# Patient Record
Sex: FEMALE | Race: WHITE | NOT HISPANIC OR LATINO | Employment: FULL TIME | URBAN - METROPOLITAN AREA
[De-identification: names, ages, dates, MRNs, and addresses within clinical notes are randomized per-mention and may not be internally consistent; named-entity substitution may affect disease eponyms.]

---

## 2023-09-28 ENCOUNTER — LAB (OUTPATIENT)
Dept: LAB | Facility: HOSPITAL | Age: 63
End: 2023-09-28
Payer: COMMERCIAL

## 2023-09-28 ENCOUNTER — OFFICE VISIT (OUTPATIENT)
Dept: FAMILY MEDICINE CLINIC | Facility: CLINIC | Age: 63
End: 2023-09-28
Payer: COMMERCIAL

## 2023-09-28 VITALS
TEMPERATURE: 98.6 F | OXYGEN SATURATION: 98 % | HEART RATE: 78 BPM | WEIGHT: 169.8 LBS | BODY MASS INDEX: 26.65 KG/M2 | SYSTOLIC BLOOD PRESSURE: 122 MMHG | DIASTOLIC BLOOD PRESSURE: 80 MMHG | HEIGHT: 67 IN

## 2023-09-28 DIAGNOSIS — Z12.31 ENCOUNTER FOR SCREENING MAMMOGRAM FOR MALIGNANT NEOPLASM OF BREAST: ICD-10-CM

## 2023-09-28 DIAGNOSIS — E11.65 TYPE 2 DIABETES MELLITUS WITH HYPERGLYCEMIA, WITHOUT LONG-TERM CURRENT USE OF INSULIN: ICD-10-CM

## 2023-09-28 DIAGNOSIS — E55.9 VITAMIN D DEFICIENCY: ICD-10-CM

## 2023-09-28 DIAGNOSIS — J45.20 MILD INTERMITTENT ASTHMA WITHOUT COMPLICATION: ICD-10-CM

## 2023-09-28 DIAGNOSIS — Z71.6 TOBACCO ABUSE COUNSELING: ICD-10-CM

## 2023-09-28 DIAGNOSIS — I10 PRIMARY HYPERTENSION: ICD-10-CM

## 2023-09-28 DIAGNOSIS — Z12.11 COLON CANCER SCREENING: ICD-10-CM

## 2023-09-28 DIAGNOSIS — R39.198 DIFFICULTY IN URINATION: Primary | ICD-10-CM

## 2023-09-28 DIAGNOSIS — Z01.419 ENCOUNTER FOR CERVICAL PAP SMEAR WITH PELVIC EXAM: ICD-10-CM

## 2023-09-28 DIAGNOSIS — I47.9 PAROXYSMAL TACHYCARDIA: ICD-10-CM

## 2023-09-28 DIAGNOSIS — Z87.891 PERSONAL HISTORY OF TOBACCO USE, PRESENTING HAZARDS TO HEALTH: ICD-10-CM

## 2023-09-28 DIAGNOSIS — Z82.49 FAMILY HISTORY OF HEART DISEASE: ICD-10-CM

## 2023-09-28 DIAGNOSIS — E78.5 DYSLIPIDEMIA, GOAL LDL BELOW 70: ICD-10-CM

## 2023-09-28 DIAGNOSIS — Z78.0 MENOPAUSE: ICD-10-CM

## 2023-09-28 DIAGNOSIS — Z00.00 HEALTHCARE MAINTENANCE: ICD-10-CM

## 2023-09-28 LAB
ALBUMIN UR-MCNC: 4.7 MG/DL
BASOPHILS # BLD AUTO: 0.06 10*3/MM3 (ref 0–0.2)
BASOPHILS NFR BLD AUTO: 0.6 % (ref 0–1.5)
BILIRUB BLD-MCNC: NEGATIVE MG/DL
CLARITY, POC: CLEAR
COLOR UR: YELLOW
CREAT UR-MCNC: 61.5 MG/DL
DEPRECATED RDW RBC AUTO: 44.1 FL (ref 37–54)
EOSINOPHIL # BLD AUTO: 0.11 10*3/MM3 (ref 0–0.4)
EOSINOPHIL NFR BLD AUTO: 1.1 % (ref 0.3–6.2)
ERYTHROCYTE [DISTWIDTH] IN BLOOD BY AUTOMATED COUNT: 14.4 % (ref 12.3–15.4)
EXPIRATION DATE: ABNORMAL
EXPIRATION DATE: NORMAL
GLUCOSE UR STRIP-MCNC: NEGATIVE MG/DL
HBA1C MFR BLD: 7.3 %
HCT VFR BLD AUTO: 40 % (ref 34–46.6)
HGB BLD-MCNC: 13.8 G/DL (ref 12–15.9)
IMM GRANULOCYTES # BLD AUTO: 0.03 10*3/MM3 (ref 0–0.05)
IMM GRANULOCYTES NFR BLD AUTO: 0.3 % (ref 0–0.5)
KETONES UR QL: ABNORMAL
LEUKOCYTE EST, POC: NEGATIVE
LYMPHOCYTES # BLD AUTO: 1.86 10*3/MM3 (ref 0.7–3.1)
LYMPHOCYTES NFR BLD AUTO: 18.7 % (ref 19.6–45.3)
Lab: ABNORMAL
Lab: NORMAL
MCH RBC QN AUTO: 29.3 PG (ref 26.6–33)
MCHC RBC AUTO-ENTMCNC: 34.5 G/DL (ref 31.5–35.7)
MCV RBC AUTO: 84.9 FL (ref 79–97)
MICROALBUMIN/CREAT UR: 76.4 MG/G
MONOCYTES # BLD AUTO: 1.02 10*3/MM3 (ref 0.1–0.9)
MONOCYTES NFR BLD AUTO: 10.3 % (ref 5–12)
NEUTROPHILS NFR BLD AUTO: 6.87 10*3/MM3 (ref 1.7–7)
NEUTROPHILS NFR BLD AUTO: 69 % (ref 42.7–76)
NITRITE UR-MCNC: NEGATIVE MG/ML
NRBC BLD AUTO-RTO: 0 /100 WBC (ref 0–0.2)
PH UR: 6 [PH] (ref 5–8)
PLATELET # BLD AUTO: 432 10*3/MM3 (ref 140–450)
PMV BLD AUTO: 9.7 FL (ref 6–12)
PROT UR STRIP-MCNC: NEGATIVE MG/DL
RBC # BLD AUTO: 4.71 10*6/MM3 (ref 3.77–5.28)
RBC # UR STRIP: ABNORMAL /UL
SP GR UR: 1.02 (ref 1–1.03)
UROBILINOGEN UR QL: NORMAL
WBC NRBC COR # BLD: 9.95 10*3/MM3 (ref 3.4–10.8)

## 2023-09-28 PROCEDURE — 82043 UR ALBUMIN QUANTITATIVE: CPT | Performed by: NURSE PRACTITIONER

## 2023-09-28 PROCEDURE — 82306 VITAMIN D 25 HYDROXY: CPT

## 2023-09-28 PROCEDURE — 80050 GENERAL HEALTH PANEL: CPT

## 2023-09-28 PROCEDURE — 80061 LIPID PANEL: CPT

## 2023-09-28 PROCEDURE — 82570 ASSAY OF URINE CREATININE: CPT | Performed by: NURSE PRACTITIONER

## 2023-09-28 RX ORDER — HYDROCHLOROTHIAZIDE 25 MG/1
25 TABLET ORAL DAILY
Qty: 90 TABLET | Refills: 1 | Status: SHIPPED | OUTPATIENT
Start: 2023-09-28

## 2023-09-28 RX ORDER — METOPROLOL TARTRATE 50 MG/1
50 TABLET, FILM COATED ORAL 2 TIMES DAILY
Qty: 180 TABLET | Refills: 1 | Status: SHIPPED | OUTPATIENT
Start: 2023-09-28

## 2023-09-28 RX ORDER — GLIPIZIDE 10 MG/1
10 TABLET, FILM COATED, EXTENDED RELEASE ORAL DAILY
COMMUNITY
End: 2023-09-28 | Stop reason: SDUPTHER

## 2023-09-28 RX ORDER — ASPIRIN 81 MG/1
81 TABLET ORAL DAILY
COMMUNITY

## 2023-09-28 RX ORDER — THIAMINE HCL 100 MG
TABLET ORAL DAILY
COMMUNITY

## 2023-09-28 RX ORDER — ROSUVASTATIN CALCIUM 5 MG/1
5 TABLET, COATED ORAL NIGHTLY
Qty: 90 TABLET | Refills: 1 | Status: SHIPPED | OUTPATIENT
Start: 2023-09-28

## 2023-09-28 RX ORDER — ALBUTEROL SULFATE 90 UG/1
2 AEROSOL, METERED RESPIRATORY (INHALATION) EVERY 4 HOURS PRN
COMMUNITY
End: 2023-09-28 | Stop reason: SDUPTHER

## 2023-09-28 RX ORDER — LOSARTAN POTASSIUM 25 MG/1
25 TABLET ORAL DAILY
Qty: 90 TABLET | Refills: 1 | Status: SHIPPED | OUTPATIENT
Start: 2023-09-28

## 2023-09-28 RX ORDER — GLIPIZIDE 10 MG/1
10 TABLET, FILM COATED, EXTENDED RELEASE ORAL DAILY
Qty: 90 TABLET | Refills: 1 | Status: SHIPPED | OUTPATIENT
Start: 2023-09-28

## 2023-09-28 RX ORDER — HYDROCHLOROTHIAZIDE 25 MG/1
25 TABLET ORAL DAILY
COMMUNITY
End: 2023-09-28 | Stop reason: SDUPTHER

## 2023-09-28 RX ORDER — AMLODIPINE BESYLATE 10 MG/1
10 TABLET ORAL DAILY
Qty: 90 TABLET | Refills: 1 | Status: SHIPPED | OUTPATIENT
Start: 2023-09-28

## 2023-09-28 RX ORDER — METOPROLOL TARTRATE 50 MG/1
50 TABLET, FILM COATED ORAL 2 TIMES DAILY
COMMUNITY
End: 2023-09-28 | Stop reason: SDUPTHER

## 2023-09-28 RX ORDER — ALBUTEROL SULFATE 90 UG/1
2 AEROSOL, METERED RESPIRATORY (INHALATION) EVERY 4 HOURS PRN
Qty: 18 G | Refills: 11 | Status: SHIPPED | OUTPATIENT
Start: 2023-09-28

## 2023-09-28 RX ORDER — LOSARTAN POTASSIUM 25 MG/1
25 TABLET ORAL DAILY
COMMUNITY
End: 2023-09-28 | Stop reason: SDUPTHER

## 2023-09-28 RX ORDER — OMEPRAZOLE 20 MG/1
20 CAPSULE, DELAYED RELEASE ORAL DAILY
COMMUNITY
End: 2023-09-28 | Stop reason: SDUPTHER

## 2023-09-28 RX ORDER — OMEPRAZOLE 20 MG/1
20 CAPSULE, DELAYED RELEASE ORAL DAILY
Qty: 90 CAPSULE | Refills: 1 | Status: SHIPPED | OUTPATIENT
Start: 2023-09-28

## 2023-09-28 RX ORDER — AMLODIPINE BESYLATE 10 MG/1
10 TABLET ORAL DAILY
COMMUNITY
End: 2023-09-28 | Stop reason: SDUPTHER

## 2023-09-28 NOTE — PROGRESS NOTES
Subjective   Pati SIMON is a 63 y.o. female here to establish care.  Chief Complaint   Patient presents with    New Patient Preventive Medicine Services    Urinary Retention     Pt. States she has noticed not denny a lot and an odor      Diabetes     Last A1C the end of last year .       History of Present Illness   Patient is here to establish care, previous PCP Gunjan Gonzalez in [place] .   10 years ago, moved to be closer to children  Working as  at a convenience store    The following portions of the patient's history were reviewed and updated as appropriate: allergies, current medications, past family history, past medical history, past social history, past surgical history, and problem list.    Review of Systems   Constitutional:  Positive for appetite change (decreased). Negative for chills, fatigue, fever and unexpected weight change.   HENT:  Positive for hearing loss. Negative for congestion and ear pain.    Eyes:  Positive for visual disturbance (occ). Negative for photophobia and redness.   Respiratory:  Positive for cough (occ smoker's cough). Negative for shortness of breath and wheezing.    Cardiovascular:  Positive for leg swelling (occ if on feet a long time). Negative for chest pain and palpitations.   Gastrointestinal:  Positive for diarrhea (occ). Negative for abdominal pain, blood in stool and constipation.   Endocrine: Negative for cold intolerance, heat intolerance, polydipsia and polyuria.   Genitourinary:  Positive for dysuria (and urine odor). Negative for difficulty urinating and vaginal bleeding.   Musculoskeletal:  Positive for arthralgias (thumb joints) and back pain (repetitive work).   Skin:  Negative for color change, pallor, rash and wound.   Neurological:  Positive for dizziness (occ) and numbness (occ in feet). Negative for tremors, seizures, syncope and headaches.   Psychiatric/Behavioral:  Negative for decreased concentration, self-injury, sleep  "disturbance and suicidal ideas. The patient is nervous/anxious (occ).    Blood pressure 122/80, pulse 78, temperature 98.6 °F (37 °C), temperature source Oral, height 170.2 cm (67\"), weight 77 kg (169 lb 12.8 oz), SpO2 98 %.    Allergies   Allergen Reactions    Codeine Irritability     jittering    Lipitor [Atorvastatin] Myalgia     Past Medical History:   Diagnosis Date    Diabetes mellitus     GERD (gastroesophageal reflux disease)     Hypertension     Pneumonia     Urinary tract infection     Visual impairment      Past Surgical History:   Procedure Laterality Date     SECTION      4     Family History   Problem Relation Age of Onset    Diabetes Mother     Heart attack Mother 56    Diabetes Father     Heart attack Father     Diabetes Sister     Suicidality Maternal Grandmother     Heart attack Maternal Grandfather     Stroke Paternal Grandmother     Diabetes Paternal Grandmother     Alcohol abuse Paternal Grandfather     Heart disease Paternal Grandfather      Social History     Socioeconomic History    Marital status:    Tobacco Use    Smoking status: Every Day     Packs/day: 1.00     Years: 41.00     Pack years: 41.00     Types: Cigarettes     Start date: 1982     Passive exposure: Never    Smokeless tobacco: Never   Vaping Use    Vaping Use: Never used   Substance and Sexual Activity    Alcohol use: Not Currently     Alcohol/week: 1.0 standard drink     Types: 1 Glasses of wine per week     Comment: rare    Drug use: Not Currently     Types: Marijuana     Comment: occ    Sexual activity: Not Currently     Partners: Male     Birth control/protection: Post-menopausal     Immunization History   Administered Date(s) Administered    Fluad Quad 65+ 2022    Fluzone (or Fluarix & Flulaval for VFC) >6mos 2021       Current Outpatient Medications:     albuterol sulfate  (90 Base) MCG/ACT inhaler, Inhale 2 puffs Every 4 (Four) Hours As Needed for Wheezing., Disp: 18 g, Rfl: 11    " amLODIPine (NORVASC) 10 MG tablet, Take 1 tablet by mouth Daily., Disp: 90 tablet, Rfl: 1    aspirin 81 MG EC tablet, Take 1 tablet by mouth Daily., Disp: , Rfl:     Calcium Citrate-Vitamin D3 (CITRACAL) 315-6.25 MG-MCG tablet tablet, Take  by mouth Daily., Disp: , Rfl:     glipizide (GLUCOTROL XL) 10 MG 24 hr tablet, Take 1 tablet by mouth Daily., Disp: 90 tablet, Rfl: 1    hydroCHLOROthiazide (HYDRODIURIL) 25 MG tablet, Take 1 tablet by mouth Daily., Disp: 90 tablet, Rfl: 1    losartan (COZAAR) 25 MG tablet, Take 1 tablet by mouth Daily., Disp: 90 tablet, Rfl: 1    metFORMIN (GLUCOPHAGE) 1000 MG tablet, Take 1 tablet by mouth 2 (Two) Times a Day With Meals., Disp: 180 tablet, Rfl: 1    metoprolol tartrate (LOPRESSOR) 50 MG tablet, Take 1 tablet by mouth 2 (Two) Times a Day., Disp: 180 tablet, Rfl: 1    omeprazole (priLOSEC) 20 MG capsule, Take 1 capsule by mouth Daily., Disp: 90 capsule, Rfl: 1    empagliflozin (Jardiance) 10 MG tablet tablet, Take 1 tablet by mouth Daily., Disp: 90 tablet, Rfl: 1    rosuvastatin (Crestor) 5 MG tablet, Take 1 tablet by mouth Every Night., Disp: 90 tablet, Rfl: 1    Objective   Physical Exam  Vitals reviewed.   Constitutional:       General: She is not in acute distress.     Appearance: Normal appearance. She is not ill-appearing, toxic-appearing or diaphoretic.   Neck:      Vascular: No carotid bruit.   Cardiovascular:      Rate and Rhythm: Normal rate and regular rhythm.      Heart sounds: Normal heart sounds.   Pulmonary:      Effort: Pulmonary effort is normal. No respiratory distress.      Breath sounds: Normal breath sounds.   Abdominal:      Palpations: Abdomen is soft.   Musculoskeletal:      Right lower leg: No edema.      Left lower leg: No edema.   Neurological:      Mental Status: She is alert and oriented to person, place, and time.   Psychiatric:         Thought Content: Thought content normal.         Judgment: Judgment normal.     Results for orders placed or  performed in visit on 09/28/23   Microalbumin / Creatinine Urine Ratio - Urine, Clean Catch    Specimen: Urine, Clean Catch   Result Value Ref Range    Microalbumin/Creatinine Ratio 76.4 mg/g    Creatinine, Urine 61.5 mg/dL    Microalbumin, Urine 4.7 mg/dL   POCT urinalysis dipstick, automated    Specimen: Urine   Result Value Ref Range    Color Yellow Yellow, Straw, Dark Yellow, Mae    Clarity, UA Clear Clear    Specific Gravity  1.020 1.005 - 1.030    pH, Urine 6.0 5.0 - 8.0    Leukocytes Negative Negative    Nitrite, UA Negative Negative    Protein, POC Negative Negative mg/dL    Glucose, UA Negative Negative mg/dL    Ketones, UA 2+ (A) Negative    Urobilinogen, UA Normal Normal, 0.2 E.U./dL    Bilirubin Negative Negative    Blood, UA 2+ (A) Negative    Lot Number [id number]     Expiration Date 3/25/24    POC Glycosylated Hemoglobin (Hb A1C)    Specimen: Blood   Result Value Ref Range    Hemoglobin A1C 7.3 %    Lot Number [id number]     Expiration Date 6/20/25        Assessment & Plan   Diagnoses and all orders for this visit:    1. Difficulty in urination (Primary)  -     POCT urinalysis dipstick, automated  -     Urinalysis With Culture If Indicated - Urine, Clean Catch; Future    2. Type 2 diabetes mellitus with hyperglycemia, without long-term current use of insulin  -     POC Glycosylated Hemoglobin (Hb A1C)  -     Ambulatory Referral for Diabetic Eye Exam-Ophthalmology  -     rosuvastatin (Crestor) 5 MG tablet; Take 1 tablet by mouth Every Night.  Dispense: 90 tablet; Refill: 1  -     empagliflozin (Jardiance) 10 MG tablet tablet; Take 1 tablet by mouth Daily.  Dispense: 90 tablet; Refill: 1  -     metFORMIN (GLUCOPHAGE) 1000 MG tablet; Take 1 tablet by mouth 2 (Two) Times a Day With Meals.  Dispense: 180 tablet; Refill: 1  -     glipizide (GLUCOTROL XL) 10 MG 24 hr tablet; Take 1 tablet by mouth Daily.  Dispense: 90 tablet; Refill: 1  -     Microalbumin / Creatinine Urine Ratio - Urine, Clean Catch;  Future  -     Microalbumin / Creatinine Urine Ratio - Urine, Clean Catch    3. Colon cancer screening  -     Cologuard - Stool, Per Rectum; Future    4. Encounter for screening mammogram for malignant neoplasm of breast  -     Mammo Screening Digital Tomosynthesis Bilateral With CAD; Future    5. Healthcare maintenance  -     CBC Auto Differential; Future  -     Comprehensive Metabolic Panel; Future  -     Lipid Panel; Future  -     TSH Rfx On Abnormal To Free T4; Future    6. Vitamin D deficiency  -     Vitamin D,25-Hydroxy; Future    7. Personal history of tobacco use, presenting hazards to health  -     CT chest low dose wo; Future    8. Tobacco abuse counseling    9. Encounter for cervical Pap smear with pelvic exam  -     Ambulatory Referral to Gynecology    10. Paroxysmal tachycardia  -     Ambulatory Referral to Cardiology  -     metoprolol tartrate (LOPRESSOR) 50 MG tablet; Take 1 tablet by mouth 2 (Two) Times a Day.  Dispense: 180 tablet; Refill: 1    11. Family history of heart disease  -     Ambulatory Referral to Cardiology  -     rosuvastatin (Crestor) 5 MG tablet; Take 1 tablet by mouth Every Night.  Dispense: 90 tablet; Refill: 1    12. Dyslipidemia, goal LDL below 70  -     rosuvastatin (Crestor) 5 MG tablet; Take 1 tablet by mouth Every Night.  Dispense: 90 tablet; Refill: 1    13. Primary hypertension  -     hydroCHLOROthiazide (HYDRODIURIL) 25 MG tablet; Take 1 tablet by mouth Daily.  Dispense: 90 tablet; Refill: 1  -     losartan (COZAAR) 25 MG tablet; Take 1 tablet by mouth Daily.  Dispense: 90 tablet; Refill: 1    14. Menopause  -     DEXA Bone Density Axial; Future    15. Mild intermittent asthma without complication    Other orders  -     amLODIPine (NORVASC) 10 MG tablet; Take 1 tablet by mouth Daily.  Dispense: 90 tablet; Refill: 1  -     omeprazole (priLOSEC) 20 MG capsule; Take 1 capsule by mouth Daily.  Dispense: 90 capsule; Refill: 1  -     albuterol sulfate  (90 Base) MCG/ACT  inhaler; Inhale 2 puffs Every 4 (Four) Hours As Needed for Wheezing.  Dispense: 18 g; Refill: 11      New Medications Ordered This Visit   Medications    rosuvastatin (Crestor) 5 MG tablet     Sig: Take 1 tablet by mouth Every Night.     Dispense:  90 tablet     Refill:  1    empagliflozin (Jardiance) 10 MG tablet tablet     Sig: Take 1 tablet by mouth Daily.     Dispense:  90 tablet     Refill:  1    metFORMIN (GLUCOPHAGE) 1000 MG tablet     Sig: Take 1 tablet by mouth 2 (Two) Times a Day With Meals.     Dispense:  180 tablet     Refill:  1    amLODIPine (NORVASC) 10 MG tablet     Sig: Take 1 tablet by mouth Daily.     Dispense:  90 tablet     Refill:  1    glipizide (GLUCOTROL XL) 10 MG 24 hr tablet     Sig: Take 1 tablet by mouth Daily.     Dispense:  90 tablet     Refill:  1    omeprazole (priLOSEC) 20 MG capsule     Sig: Take 1 capsule by mouth Daily.     Dispense:  90 capsule     Refill:  1    hydroCHLOROthiazide (HYDRODIURIL) 25 MG tablet     Sig: Take 1 tablet by mouth Daily.     Dispense:  90 tablet     Refill:  1    losartan (COZAAR) 25 MG tablet     Sig: Take 1 tablet by mouth Daily.     Dispense:  90 tablet     Refill:  1    metoprolol tartrate (LOPRESSOR) 50 MG tablet     Sig: Take 1 tablet by mouth 2 (Two) Times a Day.     Dispense:  180 tablet     Refill:  1    albuterol sulfate  (90 Base) MCG/ACT inhaler     Sig: Inhale 2 puffs Every 4 (Four) Hours As Needed for Wheezing.     Dispense:  18 g     Refill:  11     Screening labs ordered to evaluate chronic conditions. I will contact patient regarding test results and provide instructions regarding any necessary changes in plan of care.  Diabetes is not well controlled, add Jardiance, continue metformin and glipizide Risks, benefits, and potential side effects of current/new medications reviewed with patient.  Patient voiced understanding and wished to proceed with treatment.  Start crestor, referred to cardiology  HTN is controlled, continue  current medications  Smoking cessation advised.  Pt voiced understanding of health risks of continued smoking.  Nutrition and activity goals reviewed including: mainly water to drink, limit white flour/processed sugar, and processed foods, choose fresh fruits, vegetables, fish, lean meats,high fiber carbs, exercise  working toward 150 mins cardio per week, weight training 2x/week.  Patient was encouraged to keep me informed of any acute changes, lack of improvement, or any new concerning symptoms.

## 2023-09-29 LAB
25(OH)D3 SERPL-MCNC: 41.9 NG/ML (ref 30–100)
ALBUMIN SERPL-MCNC: 4.7 G/DL (ref 3.5–5.2)
ALBUMIN/GLOB SERPL: 1.6 G/DL
ALP SERPL-CCNC: 92 U/L (ref 39–117)
ALT SERPL W P-5'-P-CCNC: <5 U/L (ref 1–33)
ANION GAP SERPL CALCULATED.3IONS-SCNC: 11 MMOL/L (ref 5–15)
AST SERPL-CCNC: 12 U/L (ref 1–32)
BILIRUB SERPL-MCNC: 0.3 MG/DL (ref 0–1.2)
BUN SERPL-MCNC: 8 MG/DL (ref 8–23)
BUN/CREAT SERPL: 14.8 (ref 7–25)
CALCIUM SPEC-SCNC: 10.1 MG/DL (ref 8.6–10.5)
CHLORIDE SERPL-SCNC: 95 MMOL/L (ref 98–107)
CHOLEST SERPL-MCNC: 253 MG/DL (ref 0–200)
CO2 SERPL-SCNC: 27 MMOL/L (ref 22–29)
CREAT SERPL-MCNC: 0.54 MG/DL (ref 0.57–1)
EGFRCR SERPLBLD CKD-EPI 2021: 104.2 ML/MIN/1.73
GLOBULIN UR ELPH-MCNC: 3 GM/DL
GLUCOSE SERPL-MCNC: 187 MG/DL (ref 65–99)
HDLC SERPL-MCNC: 48 MG/DL (ref 40–60)
LDLC SERPL CALC-MCNC: 181 MG/DL (ref 0–100)
LDLC/HDLC SERPL: 3.72 {RATIO}
POTASSIUM SERPL-SCNC: 4.2 MMOL/L (ref 3.5–5.2)
PROT SERPL-MCNC: 7.7 G/DL (ref 6–8.5)
SODIUM SERPL-SCNC: 133 MMOL/L (ref 136–145)
TRIGL SERPL-MCNC: 133 MG/DL (ref 0–150)
TSH SERPL DL<=0.05 MIU/L-ACNC: 2.21 UIU/ML (ref 0.27–4.2)
VLDLC SERPL-MCNC: 24 MG/DL (ref 5–40)

## 2023-10-06 ENCOUNTER — TELEPHONE (OUTPATIENT)
Dept: FAMILY MEDICINE CLINIC | Facility: CLINIC | Age: 63
End: 2023-10-06
Payer: COMMERCIAL

## 2023-12-28 ENCOUNTER — OFFICE VISIT (OUTPATIENT)
Dept: FAMILY MEDICINE CLINIC | Facility: CLINIC | Age: 63
End: 2023-12-28
Payer: COMMERCIAL

## 2023-12-28 VITALS
DIASTOLIC BLOOD PRESSURE: 68 MMHG | HEART RATE: 90 BPM | BODY MASS INDEX: 25.27 KG/M2 | HEIGHT: 67 IN | OXYGEN SATURATION: 97 % | SYSTOLIC BLOOD PRESSURE: 110 MMHG | WEIGHT: 161 LBS

## 2023-12-28 DIAGNOSIS — M62.838 MUSCLE SPASMS OF NECK: ICD-10-CM

## 2023-12-28 DIAGNOSIS — E78.5 DYSLIPIDEMIA, GOAL LDL BELOW 70: ICD-10-CM

## 2023-12-28 DIAGNOSIS — I47.9 PAROXYSMAL TACHYCARDIA: ICD-10-CM

## 2023-12-28 DIAGNOSIS — Z00.00 ANNUAL PHYSICAL EXAM: Primary | ICD-10-CM

## 2023-12-28 DIAGNOSIS — F17.210 CIGARETTE SMOKER: ICD-10-CM

## 2023-12-28 DIAGNOSIS — E11.65 UNCONTROLLED TYPE 2 DIABETES MELLITUS WITH HYPERGLYCEMIA: ICD-10-CM

## 2023-12-28 DIAGNOSIS — Z23 NEEDS FLU SHOT: ICD-10-CM

## 2023-12-28 LAB
EXPIRATION DATE: ABNORMAL
HBA1C MFR BLD: 7.9 % (ref 4.5–5.7)
Lab: ABNORMAL

## 2023-12-28 RX ORDER — EZETIMIBE 10 MG/1
10 TABLET ORAL DAILY
Qty: 90 TABLET | Refills: 1 | Status: SHIPPED | OUTPATIENT
Start: 2023-12-28

## 2023-12-28 RX ORDER — METOPROLOL TARTRATE 50 MG/1
50 TABLET, FILM COATED ORAL 2 TIMES DAILY
Qty: 180 TABLET | Refills: 1 | Status: SHIPPED | OUTPATIENT
Start: 2023-12-28

## 2023-12-28 RX ORDER — CYCLOBENZAPRINE HCL 5 MG
5 TABLET ORAL 3 TIMES DAILY PRN
Qty: 30 TABLET | Refills: 0 | Status: SHIPPED | OUTPATIENT
Start: 2023-12-28

## 2023-12-28 NOTE — PATIENT INSTRUCTIONS
Contact GYN, breast center for mammogram, do cologuard, schedule CT scan for lung cancer screening    Let me know if there is a problem getting your medications

## 2023-12-28 NOTE — PROGRESS NOTES
"  Patient Care Team:  Anuradha Toledo APRN as PCP - General (Nurse Practitioner)     Chief Complaint   Patient presents with    Annual Exam     Stopped taking cholesterol med because caused nausea   Has been having some neck spasms so may want muscle relaxers   Wants refill for lopressor        Chief complaint: Patient is in today for a physical     Patient is a 63 y.o. female who presents for her yearly physical exam.     HPI   States she took rosuvastatin for a month, caused \"really bad\" nausea, stopped taking, symptoms resolved.  Does not eat fried foods, has occ low blood sugars; blood sugars running 120's most of the time. She never picked up the Jardiance  States she has the cologuard, just has not done yet  Will schedule mammogram, eye exam, GYN  Has been having neck pain x 3 weeks, works at a cash register.  Trying to cut back on smoking  Review of Systems   Constitutional:  Negative for chills, fatigue, fever and unexpected weight change.   HENT:  Positive for hearing loss. Negative for congestion and ear pain.    Eyes:  Positive for visual disturbance (occ). Negative for photophobia, pain and redness.   Respiratory:  Positive for cough (occ smoker's cough). Negative for shortness of breath and wheezing.    Cardiovascular:  Positive for leg swelling (occ). Negative for chest pain and palpitations.   Gastrointestinal:  Positive for diarrhea (occ food related). Negative for abdominal pain, blood in stool, constipation, nausea and vomiting.   Endocrine: Negative for polydipsia and polyuria.   Genitourinary:  Negative for difficulty urinating, dysuria and vaginal bleeding.   Musculoskeletal:  Positive for arthralgias, neck pain and neck stiffness.   Skin:  Negative for color change, pallor, rash and wound.   Neurological:  Positive for dizziness (occ, if gets up too fast), numbness (occ in feet) and headaches (occ). Negative for tremors.   Psychiatric/Behavioral:  Positive for dysphoric mood (occ, able to " manage) and sleep disturbance (occ). Negative for self-injury and suicidal ideas. The patient is nervous/anxious.       History  Past Medical History:   Diagnosis Date    Diabetes mellitus     GERD (gastroesophageal reflux disease)     Hypertension     Pneumonia     Urinary tract infection     Visual impairment       Past Surgical History:   Procedure Laterality Date     SECTION      4      Allergies   Allergen Reactions    Codeine Irritability     jittering    Lipitor [Atorvastatin] Myalgia    Rosuvastatin Nausea Only      Family History   Problem Relation Age of Onset    Diabetes Mother     Heart attack Mother 56    Diabetes Father     Heart attack Father     Diabetes Sister     Suicidality Maternal Grandmother     Heart attack Maternal Grandfather     Stroke Paternal Grandmother     Diabetes Paternal Grandmother     Alcohol abuse Paternal Grandfather     Heart disease Paternal Grandfather      Social History     Socioeconomic History    Marital status:    Tobacco Use    Smoking status: Every Day     Packs/day: 1.00     Years: 41.00     Additional pack years: 0.00     Total pack years: 41.00     Types: Cigarettes     Start date: 1982     Passive exposure: Never    Smokeless tobacco: Never   Vaping Use    Vaping Use: Never used   Substance and Sexual Activity    Alcohol use: Not Currently     Alcohol/week: 1.0 standard drink of alcohol     Types: 1 Glasses of wine per week     Comment: rare    Drug use: Not Currently     Types: Marijuana     Comment: occ    Sexual activity: Not Currently     Partners: Male     Birth control/protection: Post-menopausal        Current Outpatient Medications:     albuterol sulfate  (90 Base) MCG/ACT inhaler, Inhale 2 puffs Every 4 (Four) Hours As Needed for Wheezing., Disp: 18 g, Rfl: 11    amLODIPine (NORVASC) 10 MG tablet, Take 1 tablet by mouth Daily., Disp: 90 tablet, Rfl: 1    aspirin 81 MG EC tablet, Take 1 tablet by mouth Daily., Disp: , Rfl:      Calcium Citrate-Vitamin D3 (CITRACAL) 315-6.25 MG-MCG tablet tablet, Take  by mouth Daily., Disp: , Rfl:     glipizide (GLUCOTROL XL) 10 MG 24 hr tablet, Take 1 tablet by mouth Daily., Disp: 90 tablet, Rfl: 1    hydroCHLOROthiazide (HYDRODIURIL) 25 MG tablet, Take 1 tablet by mouth Daily., Disp: 90 tablet, Rfl: 1    metFORMIN (GLUCOPHAGE) 1000 MG tablet, Take 1 tablet by mouth 2 (Two) Times a Day With Meals., Disp: 180 tablet, Rfl: 1    metoprolol tartrate (LOPRESSOR) 50 MG tablet, Take 1 tablet by mouth 2 (Two) Times a Day., Disp: 180 tablet, Rfl: 1    omeprazole (priLOSEC) 20 MG capsule, Take 1 capsule by mouth Daily., Disp: 90 capsule, Rfl: 1    cyclobenzaprine (FLEXERIL) 5 MG tablet, Take 1 tablet by mouth 3 (Three) Times a Day As Needed for Muscle Spasms., Disp: 30 tablet, Rfl: 0    ezetimibe (Zetia) 10 MG tablet, Take 1 tablet by mouth Daily., Disp: 90 tablet, Rfl: 1    losartan (COZAAR) 25 MG tablet, Take 1 tablet by mouth Daily., Disp: 90 tablet, Rfl: 1    nicotine polacrilex (Nicorette) 4 MG gum, Chew 1 each Every 2 (Two) Hours As Needed for Smoking Cessation., Disp: 220 each, Rfl: 1    SITagliptin (Januvia) 50 MG tablet, Take 1 tablet by mouth Daily., Disp: 90 tablet, Rfl: 1    Immunizations   N/A   Prescribed/Refused   Date     Td/Tdap  (Booster Q 10 yrs)   []           Prescribed    []     Refused        []           Flu  (Yearly)   []        Prescribed    []     Refused        []           Pneumonia      []        Prescribed    []     Refused        []                 Hep B     []        Prescribed    []     Refused        []           Shingles  (Age 50 and older)   []        Prescribed    []     Refused        []           Immunization History   Administered Date(s) Administered    Fluad Quad 65+ 11/16/2022    Fluzone (or Fluarix & Flulaval for VFC) >6mos 11/11/2021     Health Maintenance   Topic Date Due    MAMMOGRAM  Never done    COLORECTAL CANCER SCREENING  Never done    COVID-19 Vaccine (1) Never  done    Pneumococcal Vaccine 0-64 (1 of 2 - PCV) Never done    TDAP/TD VACCINES (1 - Tdap) Never done    ZOSTER VACCINE (1 of 2) Never done    LUNG CANCER SCREENING  Never done    INFLUENZA VACCINE  08/01/2023    HEPATITIS C SCREENING  Never done    ANNUAL PHYSICAL  Never done    DIABETIC FOOT EXAM  Never done    PAP SMEAR  Never done    DIABETIC EYE EXAM  Never done    HEMOGLOBIN A1C  03/28/2024    LIPID PANEL  09/28/2024    URINE MICROALBUMIN  09/28/2024    BMI FOLLOWUP  12/28/2024        Diabetes  [x] Yes  [] No   N/A      Date     Eye Exam     []             []   Complete     [x]   Recommended Date:  Where:       Foot Exam     []         []   Complete          Obesity Counseling     []       []   Complete       Hemoglobin A1C   Date Value Ref Range Status   09/28/2023 7.3 % Corrected     Microalbumin, Urine   Date Value Ref Range Status   09/28/2023 4.7 mg/dL Final       Additional Testing      Date     Colorectal Screening       []   N/A   []   Complete    []   Ordered     Date:    Where:       Pap      []   N/A   []   Complete   []   OB/GYN Date:    Where:       Mammogram        []   N/A   []   Complete   []  OB/GYN   []   Ordered Date:    Where:     PSA  (Over age 50)    []   N/A   []   Complete   []   Ordered Date:    Where:     US Aorta  (For male smokers, age 65)     []   N/A   []   Complete   []   Ordered Date:    Where:     CT for Smoker  (Age 55-75, 30 pk yr)    []   N/A   []   Complete   []   Ordered Date:    Where:     Bone Density/DEXA      []   N/A   []   Complete   []   Ordered Date:    Follow-up:     Hep. C        []   N/A   []   Complete   []   Ordered Date:    Where:     Results for orders placed or performed in visit on 09/28/23   CBC Auto Differential    Specimen: Blood   Result Value Ref Range    WBC 9.95 3.40 - 10.80 10*3/mm3    RBC 4.71 3.77 - 5.28 10*6/mm3    Hemoglobin 13.8 12.0 - 15.9 g/dL    Hematocrit 40.0 34.0 - 46.6 %    MCV 84.9 79.0 - 97.0 fL    MCH 29.3 26.6 - 33.0 pg    MCHC 34.5  "31.5 - 35.7 g/dL    RDW 14.4 12.3 - 15.4 %    RDW-SD 44.1 37.0 - 54.0 fl    MPV 9.7 6.0 - 12.0 fL    Platelets 432 140 - 450 10*3/mm3    Neutrophil % 69.0 42.7 - 76.0 %    Lymphocyte % 18.7 (L) 19.6 - 45.3 %    Monocyte % 10.3 5.0 - 12.0 %    Eosinophil % 1.1 0.3 - 6.2 %    Basophil % 0.6 0.0 - 1.5 %    Immature Grans % 0.3 0.0 - 0.5 %    Neutrophils, Absolute 6.87 1.70 - 7.00 10*3/mm3    Lymphocytes, Absolute 1.86 0.70 - 3.10 10*3/mm3    Monocytes, Absolute 1.02 (H) 0.10 - 0.90 10*3/mm3    Eosinophils, Absolute 0.11 0.00 - 0.40 10*3/mm3    Basophils, Absolute 0.06 0.00 - 0.20 10*3/mm3    Immature Grans, Absolute 0.03 0.00 - 0.05 10*3/mm3    nRBC 0.0 0.0 - 0.2 /100 WBC   Comprehensive Metabolic Panel    Specimen: Blood   Result Value Ref Range    Glucose 187 (H) 65 - 99 mg/dL    BUN 8 8 - 23 mg/dL    Creatinine 0.54 (L) 0.57 - 1.00 mg/dL    Sodium 133 (L) 136 - 145 mmol/L    Potassium 4.2 3.5 - 5.2 mmol/L    Chloride 95 (L) 98 - 107 mmol/L    CO2 27.0 22.0 - 29.0 mmol/L    Calcium 10.1 8.6 - 10.5 mg/dL    Total Protein 7.7 6.0 - 8.5 g/dL    Albumin 4.7 3.5 - 5.2 g/dL    ALT (SGPT) <5 1 - 33 U/L    AST (SGOT) 12 1 - 32 U/L    Alkaline Phosphatase 92 39 - 117 U/L    Total Bilirubin 0.3 0.0 - 1.2 mg/dL    Globulin 3.0 gm/dL    A/G Ratio 1.6 g/dL    BUN/Creatinine Ratio 14.8 7.0 - 25.0    Anion Gap 11.0 5.0 - 15.0 mmol/L    eGFR 104.2 >60.0 mL/min/1.73   Lipid Panel    Specimen: Blood   Result Value Ref Range    Total Cholesterol 253 (H) 0 - 200 mg/dL    Triglycerides 133 0 - 150 mg/dL    HDL Cholesterol 48 40 - 60 mg/dL    LDL Cholesterol  181 (H) 0 - 100 mg/dL    VLDL Cholesterol 24 5 - 40 mg/dL    LDL/HDL Ratio 3.72    Vitamin D,25-Hydroxy    Specimen: Blood   Result Value Ref Range    25 Hydroxy, Vitamin D 41.9 30.0 - 100.0 ng/ml   TSH Rfx On Abnormal To Free T4    Specimen: Blood   Result Value Ref Range    TSH 2.210 0.270 - 4.200 uIU/mL            /68   Pulse 90   Ht 170.2 cm (67.01\")   Wt 73 kg (161 lb)  "  SpO2 97%   BMI 25.21 kg/m²       Physical Exam  Vitals and nursing note reviewed.   Constitutional:       General: She is not in acute distress.     Appearance: Normal appearance. She is well-developed. She is not diaphoretic.   HENT:      Head: Normocephalic and atraumatic.      Right Ear: External ear normal.      Left Ear: External ear normal.      Nose: Nose normal.   Eyes:      General: No scleral icterus.        Right eye: No discharge.         Left eye: No discharge.      Conjunctiva/sclera: Conjunctivae normal.      Pupils: Pupils are equal, round, and reactive to light.   Neck:      Thyroid: No thyromegaly.      Vascular: No JVD.      Trachea: No tracheal deviation.   Cardiovascular:      Rate and Rhythm: Normal rate and regular rhythm.      Heart sounds: Normal heart sounds. No murmur heard.     No friction rub. No gallop.   Pulmonary:      Effort: Pulmonary effort is normal. No respiratory distress.      Breath sounds: Normal breath sounds. No stridor. No wheezing or rales.   Chest:      Chest wall: No tenderness.   Breasts:     Right: Normal.      Left: Normal.      Comments: Fibrocystic areas bilaterally  Abdominal:      General: Bowel sounds are normal. There is no distension.      Palpations: Abdomen is soft. There is no mass.      Tenderness: There is no abdominal tenderness. There is no guarding or rebound.      Hernia: No hernia is present.   Musculoskeletal:         General: No tenderness or deformity. Normal range of motion.      Cervical back: Neck supple. Tenderness present.      Right lower leg: No edema.      Left lower leg: No edema.   Lymphadenopathy:      Cervical: No cervical adenopathy.      Upper Body:      Right upper body: No supraclavicular, axillary or pectoral adenopathy.      Left upper body: No supraclavicular, axillary or pectoral adenopathy.   Skin:     General: Skin is warm and dry.      Coloration: Skin is not pale.      Findings: No erythema or rash.   Neurological:       Mental Status: She is alert and oriented to person, place, and time.      Cranial Nerves: No cranial nerve deficit.      Motor: No abnormal muscle tone.      Coordination: Coordination normal.      Deep Tendon Reflexes: Reflexes are normal and symmetric. Reflexes normal.   Psychiatric:         Behavior: Behavior normal.         Thought Content: Thought content normal.         Judgment: Judgment normal.             Counseling provided on diet and nutrition, exercise, prevention of cardiac or vascular disease, hypertension, diabetes, hyperlipidemia, anxiety, and flu prevention.    Diagnoses and all orders for this visit:    Annual physical exam    Needs flu shot  -     Fluzone >6 Months ([id number])    Uncontrolled type 2 diabetes mellitus with hyperglycemia  -     POC Glycosylated Hemoglobin (Hb A1C)  -     SITagliptin (Januvia) 50 MG tablet; Take 1 tablet by mouth Daily.    Paroxysmal tachycardia  -     metoprolol tartrate (LOPRESSOR) 50 MG tablet; Take 1 tablet by mouth 2 (Two) Times a Day.    Dyslipidemia, goal LDL below 70  -     ezetimibe (Zetia) 10 MG tablet; Take 1 tablet by mouth Daily.    Cigarette smoker  -     nicotine polacrilex (Nicorette) 4 MG gum; Chew 1 each Every 2 (Two) Hours As Needed for Smoking Cessation.    Muscle spasms of neck  -     cyclobenzaprine (FLEXERIL) 5 MG tablet; Take 1 tablet by mouth 3 (Three) Times a Day As Needed for Muscle Spasms.     Screening labs ordered to evaluate chronic conditions. I will contact patient regarding test results and provide instructions regarding any necessary changes in plan of care.  Diabetes is not controlled, patient did not  Jardiance due to cost.  Januvia prescribed, continue glipizide.  Has not tolerated Lipitor or rosuvastatin, will try Zetia for dyslipidemia  Continue metoprolol for paroxysmal tachycardia  Flexeril prescribed for muscle spasms and neck    Risks/benefits and potential side effects of various smoking cessation treatment options  "reviewed with patient.  Smoking cessation support options also reviewed with patient.  \"Beat the Pack\" brochure provided and reviewed with patient.  Patient voiced understanding and wishes to proceed with nicorette gum.  A total of 3 minutes dedicated to smoking cessation counseling during this visit.    Anuradha PlainfieldWOLF   12/28/2023   14:47 EST          Please note that portions of this document were completed with a voice recognition program.     At [facility], we believe that sharing information builds trust and better relationships. You are receiving this note because you are receiving care at [facility] or have recently visited. It is possible you will see health information before a provider has talked with you about it. This kind of information can be easy to misunderstand. To help you fully understand what it means for your health, we urge you to discuss this note with your provider.  "

## 2024-03-28 PROBLEM — E78.5 DYSLIPIDEMIA, GOAL LDL BELOW 70: Status: ACTIVE | Noted: 2024-03-28

## 2024-03-28 PROBLEM — I10 PRIMARY HYPERTENSION: Status: ACTIVE | Noted: 2024-03-28

## 2024-03-28 PROBLEM — E11.65 UNCONTROLLED TYPE 2 DIABETES MELLITUS WITH HYPERGLYCEMIA: Status: ACTIVE | Noted: 2024-03-28

## 2024-03-30 DIAGNOSIS — I10 PRIMARY HYPERTENSION: ICD-10-CM

## 2024-03-30 DIAGNOSIS — E11.65 TYPE 2 DIABETES MELLITUS WITH HYPERGLYCEMIA, WITHOUT LONG-TERM CURRENT USE OF INSULIN: ICD-10-CM

## 2024-04-01 NOTE — TELEPHONE ENCOUNTER
Rx Refill Note  Requested Prescriptions     Pending Prescriptions Disp Refills    losartan (COZAAR) 25 MG tablet [Pharmacy Med Name: LOSARTAN 25MG TABLETS] 90 tablet 1     Sig: TAKE 1 TABLET BY MOUTH DAILY    hydroCHLOROthiazide 25 MG tablet [Pharmacy Med Name: HYDROCHLOROTHIAZIDE 25MGTABLETS] 90 tablet 1     Sig: TAKE 1 TABLET BY MOUTH DAILY    glipizide (GLUCOTROL XL) 10 MG 24 hr tablet [Pharmacy Med Name: GLIPIZIDE ER 10MG TABLETS] 90 tablet 1     Sig: TAKE 1 TABLET BY MOUTH DAILY      Last office visit with prescribing clinician: 12/28/2023   Last telemedicine visit with prescribing clinician: Visit date not found   Next office visit with prescribing clinician: Visit date not found       Zoey Godoy MA  04/01/24, 14:46 EDT    Called pt. To schedule appt. For med refill. Pt. Was to Return in about 3 months (around 3/28/2024) for Recheck; A1c.      Okay for HUB to schedule & relay message.

## 2024-04-02 NOTE — TELEPHONE ENCOUNTER
Rx Refill Note  Requested Prescriptions     Pending Prescriptions Disp Refills    losartan (COZAAR) 25 MG tablet [Pharmacy Med Name: LOSARTAN 25MG TABLETS] 90 tablet 1     Sig: TAKE 1 TABLET BY MOUTH DAILY    hydroCHLOROthiazide 25 MG tablet [Pharmacy Med Name: HYDROCHLOROTHIAZIDE 25MGTABLETS] 90 tablet 1     Sig: TAKE 1 TABLET BY MOUTH DAILY    glipizide (GLUCOTROL XL) 10 MG 24 hr tablet [Pharmacy Med Name: GLIPIZIDE ER 10MG TABLETS] 90 tablet 1     Sig: TAKE 1 TABLET BY MOUTH DAILY      Last office visit with prescribing clinician: 12/28/2023   Last telemedicine visit with prescribing clinician: Visit date not found   Next office visit with prescribing clinician: Visit date not found                         Would you like a call back once the refill request has been completed: [] Yes [] No    If the office needs to give you a call back, can they leave a voicemail: [] Yes [] No    Erlinda Duran MA  04/02/24, 09:57 EDT    Patient was to Return in about 3 months (around 3/28/2024) for Recheck; A1c.     Called patient number is not accepting calls at this time    OK TO RELAY AND SCHEDULE APPOINTMENT

## 2024-04-03 RX ORDER — HYDROCHLOROTHIAZIDE 25 MG/1
25 TABLET ORAL DAILY
Qty: 90 TABLET | Refills: 1 | Status: SHIPPED | OUTPATIENT
Start: 2024-04-03

## 2024-04-03 RX ORDER — GLIPIZIDE 10 MG/1
10 TABLET, FILM COATED, EXTENDED RELEASE ORAL DAILY
Qty: 90 TABLET | Refills: 1 | Status: SHIPPED | OUTPATIENT
Start: 2024-04-03

## 2024-04-03 RX ORDER — LOSARTAN POTASSIUM 25 MG/1
25 TABLET ORAL DAILY
Qty: 90 TABLET | Refills: 1 | Status: SHIPPED | OUTPATIENT
Start: 2024-04-03

## 2024-04-03 NOTE — TELEPHONE ENCOUNTER
3rd attempt    Patient was to Return in about 3 months (around 3/28/2024) for Recheck; A1c.      Called patient number is not accepting calls at this time     OK TO RELAY AND SCHEDULE APPOINTMENT

## 2024-09-27 ENCOUNTER — APPOINTMENT (OUTPATIENT)
Dept: GENERAL RADIOLOGY | Facility: HOSPITAL | Age: 64
DRG: 871 | End: 2024-09-27
Payer: MEDICAID

## 2024-09-27 ENCOUNTER — HOSPITAL ENCOUNTER (INPATIENT)
Facility: HOSPITAL | Age: 64
LOS: 13 days | Discharge: HOME | DRG: 871 | End: 2024-10-10
Attending: EMERGENCY MEDICINE | Admitting: HOSPITALIST
Payer: MEDICAID

## 2024-09-27 DIAGNOSIS — J18.9 PNEUMONIA OF LEFT UPPER LOBE DUE TO INFECTIOUS ORGANISM: ICD-10-CM

## 2024-09-27 DIAGNOSIS — R73.9 HYPERGLYCEMIA: ICD-10-CM

## 2024-09-27 DIAGNOSIS — J96.01 ACUTE RESPIRATORY FAILURE WITH HYPOXIA AND HYPERCAPNIA: Primary | ICD-10-CM

## 2024-09-27 DIAGNOSIS — J45.51 SEVERE PERSISTENT ASTHMA WITH EXACERBATION: ICD-10-CM

## 2024-09-27 DIAGNOSIS — N17.9 ACUTE KIDNEY INJURY: ICD-10-CM

## 2024-09-27 DIAGNOSIS — C34.12 CANCER OF BRONCHUS OF LEFT UPPER LOBE: ICD-10-CM

## 2024-09-27 DIAGNOSIS — J96.02 ACUTE RESPIRATORY FAILURE WITH HYPOXIA AND HYPERCAPNIA: Primary | ICD-10-CM

## 2024-09-27 DIAGNOSIS — R91.8 ENDOBRONCHIAL MASS: ICD-10-CM

## 2024-09-27 PROBLEM — E87.20 LACTIC ACID INCREASED: Status: ACTIVE | Noted: 2024-09-27

## 2024-09-27 PROBLEM — A41.9 SEPSIS: Status: ACTIVE | Noted: 2024-09-27

## 2024-09-27 PROBLEM — R79.89 ELEVATED TROPONIN: Status: ACTIVE | Noted: 2024-09-27

## 2024-09-27 LAB
ALBUMIN SERPL-MCNC: 3.6 G/DL (ref 3.5–5.2)
ALBUMIN/GLOB SERPL: 1.1 G/DL
ALP SERPL-CCNC: 163 U/L (ref 39–117)
ALT SERPL W P-5'-P-CCNC: 16 U/L (ref 1–33)
ANION GAP SERPL CALCULATED.3IONS-SCNC: 13 MMOL/L (ref 5–15)
ARTERIAL PATENCY WRIST A: POSITIVE
AST SERPL-CCNC: 38 U/L (ref 1–32)
ATMOSPHERIC PRESS: ABNORMAL MM[HG]
B PARAPERT DNA SPEC QL NAA+PROBE: NOT DETECTED
B PERT DNA SPEC QL NAA+PROBE: NOT DETECTED
B-OH-BUTYR SERPL-SCNC: 0.77 MMOL/L (ref 0.02–0.27)
BASE EXCESS BLDA CALC-SCNC: 3.8 MMOL/L (ref 0–2)
BASOPHILS # BLD AUTO: 0.04 10*3/MM3 (ref 0–0.2)
BASOPHILS NFR BLD AUTO: 0.2 % (ref 0–1.5)
BDY SITE: ABNORMAL
BILIRUB SERPL-MCNC: 0.4 MG/DL (ref 0–1.2)
BODY TEMPERATURE: 37
BUN SERPL-MCNC: 10 MG/DL (ref 8–23)
BUN/CREAT SERPL: 19.2 (ref 7–25)
C PNEUM DNA NPH QL NAA+NON-PROBE: NOT DETECTED
CALCIUM SPEC-SCNC: 9.1 MG/DL (ref 8.6–10.5)
CHLORIDE SERPL-SCNC: 89 MMOL/L (ref 98–107)
CO2 BLDA-SCNC: 32.8 MMOL/L (ref 22–33)
CO2 SERPL-SCNC: 29 MMOL/L (ref 22–29)
COHGB MFR BLD: 5.2 % (ref 0–2)
CREAT SERPL-MCNC: 0.52 MG/DL (ref 0.57–1)
D-LACTATE SERPL-SCNC: 1.2 MMOL/L (ref 0.5–2)
D-LACTATE SERPL-SCNC: 2.5 MMOL/L (ref 0.5–2)
DEPRECATED RDW RBC AUTO: 46.4 FL (ref 37–54)
EGFRCR SERPLBLD CKD-EPI 2021: 104.5 ML/MIN/1.73
EOSINOPHIL # BLD AUTO: 0.02 10*3/MM3 (ref 0–0.4)
EOSINOPHIL NFR BLD AUTO: 0.1 % (ref 0.3–6.2)
EPAP: 0
ERYTHROCYTE [DISTWIDTH] IN BLOOD BY AUTOMATED COUNT: 15.9 % (ref 12.3–15.4)
FLUAV SUBTYP SPEC NAA+PROBE: NOT DETECTED
FLUBV RNA ISLT QL NAA+PROBE: NOT DETECTED
GLOBULIN UR ELPH-MCNC: 3.4 GM/DL
GLUCOSE BLDC GLUCOMTR-MCNC: 349 MG/DL (ref 70–130)
GLUCOSE BLDC GLUCOMTR-MCNC: 471 MG/DL (ref 70–130)
GLUCOSE BLDC GLUCOMTR-MCNC: 482 MG/DL (ref 70–130)
GLUCOSE BLDC GLUCOMTR-MCNC: 517 MG/DL (ref 70–130)
GLUCOSE BLDC GLUCOMTR-MCNC: 528 MG/DL (ref 70–130)
GLUCOSE BLDC GLUCOMTR-MCNC: 529 MG/DL (ref 70–130)
GLUCOSE SERPL-MCNC: 592 MG/DL (ref 65–99)
HADV DNA SPEC NAA+PROBE: NOT DETECTED
HCO3 BLDA-SCNC: 31 MMOL/L (ref 20–26)
HCOV 229E RNA SPEC QL NAA+PROBE: NOT DETECTED
HCOV HKU1 RNA SPEC QL NAA+PROBE: NOT DETECTED
HCOV NL63 RNA SPEC QL NAA+PROBE: NOT DETECTED
HCOV OC43 RNA SPEC QL NAA+PROBE: NOT DETECTED
HCT VFR BLD AUTO: 37.7 % (ref 34–46.6)
HCT VFR BLD CALC: 35.6 % (ref 38–51)
HGB BLD-MCNC: 11.7 G/DL (ref 12–15.9)
HGB BLDA-MCNC: 11.6 G/DL (ref 14–18)
HMPV RNA NPH QL NAA+NON-PROBE: NOT DETECTED
HOLD SPECIMEN: NORMAL
HPIV1 RNA ISLT QL NAA+PROBE: NOT DETECTED
HPIV2 RNA SPEC QL NAA+PROBE: NOT DETECTED
HPIV3 RNA NPH QL NAA+PROBE: NOT DETECTED
HPIV4 P GENE NPH QL NAA+PROBE: NOT DETECTED
IMM GRANULOCYTES # BLD AUTO: 0.08 10*3/MM3 (ref 0–0.05)
IMM GRANULOCYTES NFR BLD AUTO: 0.4 % (ref 0–0.5)
INHALED O2 CONCENTRATION: 36 %
IPAP: 0
L PNEUMO1 AG UR QL IA: NEGATIVE
LYMPHOCYTES # BLD AUTO: 1.02 10*3/MM3 (ref 0.7–3.1)
LYMPHOCYTES NFR BLD AUTO: 5.7 % (ref 19.6–45.3)
M PNEUMO IGG SER IA-ACNC: NOT DETECTED
MCH RBC QN AUTO: 24.8 PG (ref 26.6–33)
MCHC RBC AUTO-ENTMCNC: 31 G/DL (ref 31.5–35.7)
MCV RBC AUTO: 80 FL (ref 79–97)
METHGB BLD QL: 0 % (ref 0–1.5)
MODALITY: ABNORMAL
MONOCYTES # BLD AUTO: 1.5 10*3/MM3 (ref 0.1–0.9)
MONOCYTES NFR BLD AUTO: 8.4 % (ref 5–12)
NEUTROPHILS NFR BLD AUTO: 15.22 10*3/MM3 (ref 1.7–7)
NEUTROPHILS NFR BLD AUTO: 85.2 % (ref 42.7–76)
NRBC BLD AUTO-RTO: 0 /100 WBC (ref 0–0.2)
NT-PROBNP SERPL-MCNC: 6196 PG/ML (ref 0–900)
OXYHGB MFR BLDV: 86 % (ref 94–99)
PAW @ PEAK INSP FLOW SETTING VENT: 0 CMH2O
PCO2 BLDA: 58.8 MM HG (ref 35–45)
PCO2 TEMP ADJ BLD: 58.8 MM HG (ref 35–45)
PH BLDA: 7.33 PH UNITS (ref 7.35–7.45)
PH, TEMP CORRECTED: 7.33 PH UNITS
PLATELET # BLD AUTO: 543 10*3/MM3 (ref 140–450)
PMV BLD AUTO: 9.3 FL (ref 6–12)
PO2 BLDA: 64.6 MM HG (ref 83–108)
PO2 TEMP ADJ BLD: 64.6 MM HG (ref 83–108)
POTASSIUM SERPL-SCNC: 4.1 MMOL/L (ref 3.5–5.2)
PROT SERPL-MCNC: 7 G/DL (ref 6–8.5)
RBC # BLD AUTO: 4.71 10*6/MM3 (ref 3.77–5.28)
RHINOVIRUS RNA SPEC NAA+PROBE: NOT DETECTED
RSV RNA NPH QL NAA+NON-PROBE: NOT DETECTED
S PNEUM AG SPEC QL LA: NEGATIVE
SARS-COV-2 RNA NPH QL NAA+NON-PROBE: NOT DETECTED
SODIUM SERPL-SCNC: 131 MMOL/L (ref 136–145)
TOTAL RATE: 0 BREATHS/MINUTE
TROPONIN T SERPL HS-MCNC: 36 NG/L
WBC NRBC COR # BLD AUTO: 17.88 10*3/MM3 (ref 3.4–10.8)
WHOLE BLOOD HOLD COAG: NORMAL
WHOLE BLOOD HOLD SPECIMEN: NORMAL

## 2024-09-27 PROCEDURE — 93005 ELECTROCARDIOGRAM TRACING: CPT | Performed by: EMERGENCY MEDICINE

## 2024-09-27 PROCEDURE — 25010000002 ENOXAPARIN PER 10 MG: Performed by: INTERNAL MEDICINE

## 2024-09-27 PROCEDURE — 71045 X-RAY EXAM CHEST 1 VIEW: CPT

## 2024-09-27 PROCEDURE — 82375 ASSAY CARBOXYHB QUANT: CPT

## 2024-09-27 PROCEDURE — 0202U NFCT DS 22 TRGT SARS-COV-2: CPT | Performed by: INTERNAL MEDICINE

## 2024-09-27 PROCEDURE — 82805 BLOOD GASES W/O2 SATURATION: CPT

## 2024-09-27 PROCEDURE — 83880 ASSAY OF NATRIURETIC PEPTIDE: CPT | Performed by: EMERGENCY MEDICINE

## 2024-09-27 PROCEDURE — 25010000002 MAGNESIUM SULFATE IN D5W 1G/100ML (PREMIX) 1-5 GM/100ML-% SOLUTION: Performed by: EMERGENCY MEDICINE

## 2024-09-27 PROCEDURE — 25010000002 CEFTRIAXONE PER 250 MG: Performed by: INTERNAL MEDICINE

## 2024-09-27 PROCEDURE — 94640 AIRWAY INHALATION TREATMENT: CPT

## 2024-09-27 PROCEDURE — 99291 CRITICAL CARE FIRST HOUR: CPT

## 2024-09-27 PROCEDURE — 94761 N-INVAS EAR/PLS OXIMETRY MLT: CPT

## 2024-09-27 PROCEDURE — 82948 REAGENT STRIP/BLOOD GLUCOSE: CPT

## 2024-09-27 PROCEDURE — 25810000003 SODIUM CHLORIDE 0.9 % SOLUTION: Performed by: INTERNAL MEDICINE

## 2024-09-27 PROCEDURE — 87449 NOS EACH ORGANISM AG IA: CPT | Performed by: INTERNAL MEDICINE

## 2024-09-27 PROCEDURE — 94799 UNLISTED PULMONARY SVC/PX: CPT

## 2024-09-27 PROCEDURE — 25810000003 SODIUM CHLORIDE 0.9 % SOLUTION: Performed by: EMERGENCY MEDICINE

## 2024-09-27 PROCEDURE — 87040 BLOOD CULTURE FOR BACTERIA: CPT | Performed by: EMERGENCY MEDICINE

## 2024-09-27 PROCEDURE — 85025 COMPLETE CBC W/AUTO DIFF WBC: CPT | Performed by: EMERGENCY MEDICINE

## 2024-09-27 PROCEDURE — 63710000001 INSULIN LISPRO (HUMAN) PER 5 UNITS: Performed by: INTERNAL MEDICINE

## 2024-09-27 PROCEDURE — 94664 DEMO&/EVAL PT USE INHALER: CPT

## 2024-09-27 PROCEDURE — 63710000001 INSULIN REGULAR HUMAN PER 5 UNITS: Performed by: EMERGENCY MEDICINE

## 2024-09-27 PROCEDURE — 83605 ASSAY OF LACTIC ACID: CPT | Performed by: EMERGENCY MEDICINE

## 2024-09-27 PROCEDURE — 82010 KETONE BODYS QUAN: CPT | Performed by: EMERGENCY MEDICINE

## 2024-09-27 PROCEDURE — 25010000002 METHYLPREDNISOLONE PER 125 MG: Performed by: EMERGENCY MEDICINE

## 2024-09-27 PROCEDURE — 94660 CPAP INITIATION&MGMT: CPT

## 2024-09-27 PROCEDURE — 83050 HGB METHEMOGLOBIN QUAN: CPT

## 2024-09-27 PROCEDURE — 99222 1ST HOSP IP/OBS MODERATE 55: CPT | Performed by: INTERNAL MEDICINE

## 2024-09-27 PROCEDURE — 36600 WITHDRAWAL OF ARTERIAL BLOOD: CPT

## 2024-09-27 PROCEDURE — 63710000001 INSULIN GLARGINE PER 5 UNITS: Performed by: INTERNAL MEDICINE

## 2024-09-27 PROCEDURE — 84484 ASSAY OF TROPONIN QUANT: CPT | Performed by: EMERGENCY MEDICINE

## 2024-09-27 PROCEDURE — 36415 COLL VENOUS BLD VENIPUNCTURE: CPT

## 2024-09-27 PROCEDURE — 80053 COMPREHEN METABOLIC PANEL: CPT | Performed by: EMERGENCY MEDICINE

## 2024-09-27 PROCEDURE — 25810000003 LACTATED RINGERS PER 1000 ML: Performed by: INTERNAL MEDICINE

## 2024-09-27 RX ORDER — ONDANSETRON 2 MG/ML
4 INJECTION INTRAMUSCULAR; INTRAVENOUS EVERY 6 HOURS PRN
Status: DISCONTINUED | OUTPATIENT
Start: 2024-09-27 | End: 2024-10-10 | Stop reason: HOSPADM

## 2024-09-27 RX ORDER — SODIUM CHLORIDE 0.9 % (FLUSH) 0.9 %
10 SYRINGE (ML) INJECTION AS NEEDED
Status: DISCONTINUED | OUTPATIENT
Start: 2024-09-27 | End: 2024-10-10 | Stop reason: HOSPADM

## 2024-09-27 RX ORDER — DEXTROSE MONOHYDRATE 25 G/50ML
25 INJECTION, SOLUTION INTRAVENOUS
Status: DISCONTINUED | OUTPATIENT
Start: 2024-09-27 | End: 2024-10-10 | Stop reason: HOSPADM

## 2024-09-27 RX ORDER — BISACODYL 10 MG
10 SUPPOSITORY, RECTAL RECTAL DAILY PRN
Status: DISCONTINUED | OUTPATIENT
Start: 2024-09-27 | End: 2024-10-10 | Stop reason: HOSPADM

## 2024-09-27 RX ORDER — PANTOPRAZOLE SODIUM 40 MG/1
40 TABLET, DELAYED RELEASE ORAL
Status: DISCONTINUED | OUTPATIENT
Start: 2024-09-27 | End: 2024-10-10 | Stop reason: HOSPADM

## 2024-09-27 RX ORDER — CYCLOBENZAPRINE HCL 10 MG
5 TABLET ORAL 3 TIMES DAILY PRN
Status: DISCONTINUED | OUTPATIENT
Start: 2024-09-27 | End: 2024-10-10 | Stop reason: HOSPADM

## 2024-09-27 RX ORDER — METHYLPREDNISOLONE SODIUM SUCCINATE 125 MG/2ML
125 INJECTION, POWDER, LYOPHILIZED, FOR SOLUTION INTRAMUSCULAR; INTRAVENOUS ONCE
Status: COMPLETED | OUTPATIENT
Start: 2024-09-27 | End: 2024-09-27

## 2024-09-27 RX ORDER — INSULIN LISPRO 100 [IU]/ML
2-9 INJECTION, SOLUTION INTRAVENOUS; SUBCUTANEOUS
Status: DISCONTINUED | OUTPATIENT
Start: 2024-09-27 | End: 2024-10-10 | Stop reason: HOSPADM

## 2024-09-27 RX ORDER — ENOXAPARIN SODIUM 100 MG/ML
40 INJECTION SUBCUTANEOUS DAILY
Status: DISCONTINUED | OUTPATIENT
Start: 2024-09-27 | End: 2024-10-10 | Stop reason: HOSPADM

## 2024-09-27 RX ORDER — AMLODIPINE BESYLATE 10 MG/1
10 TABLET ORAL DAILY
Status: DISCONTINUED | OUTPATIENT
Start: 2024-09-27 | End: 2024-09-28

## 2024-09-27 RX ORDER — INSULIN LISPRO 100 [IU]/ML
10 INJECTION, SOLUTION INTRAVENOUS; SUBCUTANEOUS ONCE
Status: COMPLETED | OUTPATIENT
Start: 2024-09-27 | End: 2024-09-27

## 2024-09-27 RX ORDER — AMOXICILLIN 250 MG
2 CAPSULE ORAL 2 TIMES DAILY PRN
Status: DISCONTINUED | OUTPATIENT
Start: 2024-09-27 | End: 2024-10-10 | Stop reason: HOSPADM

## 2024-09-27 RX ORDER — NICOTINE POLACRILEX 4 MG
15 LOZENGE BUCCAL
Status: DISCONTINUED | OUTPATIENT
Start: 2024-09-27 | End: 2024-10-10 | Stop reason: HOSPADM

## 2024-09-27 RX ORDER — DOXYCYCLINE 100 MG/1
100 CAPSULE ORAL EVERY 12 HOURS SCHEDULED
Status: COMPLETED | OUTPATIENT
Start: 2024-09-27 | End: 2024-10-02

## 2024-09-27 RX ORDER — SODIUM CHLORIDE 0.9 % (FLUSH) 0.9 %
10 SYRINGE (ML) INJECTION EVERY 12 HOURS SCHEDULED
Status: DISCONTINUED | OUTPATIENT
Start: 2024-09-27 | End: 2024-10-10 | Stop reason: HOSPADM

## 2024-09-27 RX ORDER — ACETAMINOPHEN 160 MG/5ML
650 SOLUTION ORAL EVERY 4 HOURS PRN
Status: DISCONTINUED | OUTPATIENT
Start: 2024-09-27 | End: 2024-10-10 | Stop reason: HOSPADM

## 2024-09-27 RX ORDER — ACETAMINOPHEN 650 MG/1
650 SUPPOSITORY RECTAL EVERY 4 HOURS PRN
Status: DISCONTINUED | OUTPATIENT
Start: 2024-09-27 | End: 2024-10-10 | Stop reason: HOSPADM

## 2024-09-27 RX ORDER — SODIUM CHLORIDE 9 MG/ML
40 INJECTION, SOLUTION INTRAVENOUS AS NEEDED
Status: DISCONTINUED | OUTPATIENT
Start: 2024-09-27 | End: 2024-10-10 | Stop reason: HOSPADM

## 2024-09-27 RX ORDER — POLYETHYLENE GLYCOL 3350 17 G/17G
17 POWDER, FOR SOLUTION ORAL DAILY PRN
Status: DISCONTINUED | OUTPATIENT
Start: 2024-09-27 | End: 2024-10-10 | Stop reason: HOSPADM

## 2024-09-27 RX ORDER — MAGNESIUM SULFATE 1 G/100ML
1 INJECTION INTRAVENOUS ONCE
Status: COMPLETED | OUTPATIENT
Start: 2024-09-27 | End: 2024-09-27

## 2024-09-27 RX ORDER — SODIUM CHLORIDE, SODIUM LACTATE, POTASSIUM CHLORIDE, CALCIUM CHLORIDE 600; 310; 30; 20 MG/100ML; MG/100ML; MG/100ML; MG/100ML
150 INJECTION, SOLUTION INTRAVENOUS CONTINUOUS
Status: DISCONTINUED | OUTPATIENT
Start: 2024-09-27 | End: 2024-09-28

## 2024-09-27 RX ORDER — ASPIRIN 81 MG/1
81 TABLET ORAL DAILY
Status: DISCONTINUED | OUTPATIENT
Start: 2024-09-27 | End: 2024-10-10 | Stop reason: HOSPADM

## 2024-09-27 RX ORDER — METOPROLOL TARTRATE 50 MG
50 TABLET ORAL 2 TIMES DAILY
Status: DISCONTINUED | OUTPATIENT
Start: 2024-09-27 | End: 2024-09-28

## 2024-09-27 RX ORDER — SODIUM CHLORIDE, SODIUM LACTATE, POTASSIUM CHLORIDE, CALCIUM CHLORIDE 600; 310; 30; 20 MG/100ML; MG/100ML; MG/100ML; MG/100ML
100 INJECTION, SOLUTION INTRAVENOUS CONTINUOUS
Status: DISCONTINUED | OUTPATIENT
Start: 2024-09-27 | End: 2024-09-27

## 2024-09-27 RX ORDER — IBUPROFEN 600 MG/1
1 TABLET ORAL
Status: DISCONTINUED | OUTPATIENT
Start: 2024-09-27 | End: 2024-10-10 | Stop reason: HOSPADM

## 2024-09-27 RX ORDER — LOSARTAN POTASSIUM 25 MG/1
25 TABLET ORAL DAILY
Status: DISCONTINUED | OUTPATIENT
Start: 2024-09-27 | End: 2024-10-10 | Stop reason: HOSPADM

## 2024-09-27 RX ORDER — IPRATROPIUM BROMIDE AND ALBUTEROL SULFATE 2.5; .5 MG/3ML; MG/3ML
3 SOLUTION RESPIRATORY (INHALATION)
Status: DISCONTINUED | OUTPATIENT
Start: 2024-09-27 | End: 2024-10-06

## 2024-09-27 RX ORDER — IPRATROPIUM BROMIDE AND ALBUTEROL SULFATE 2.5; .5 MG/3ML; MG/3ML
3 SOLUTION RESPIRATORY (INHALATION) ONCE
Status: COMPLETED | OUTPATIENT
Start: 2024-09-27 | End: 2024-09-27

## 2024-09-27 RX ORDER — ONDANSETRON 4 MG/1
4 TABLET, ORALLY DISINTEGRATING ORAL EVERY 6 HOURS PRN
Status: DISCONTINUED | OUTPATIENT
Start: 2024-09-27 | End: 2024-10-10 | Stop reason: HOSPADM

## 2024-09-27 RX ORDER — ACETAMINOPHEN 325 MG/1
650 TABLET ORAL EVERY 4 HOURS PRN
Status: DISCONTINUED | OUTPATIENT
Start: 2024-09-27 | End: 2024-10-10 | Stop reason: HOSPADM

## 2024-09-27 RX ORDER — BISACODYL 5 MG/1
5 TABLET, DELAYED RELEASE ORAL DAILY PRN
Status: DISCONTINUED | OUTPATIENT
Start: 2024-09-27 | End: 2024-10-10 | Stop reason: HOSPADM

## 2024-09-27 RX ADMIN — SODIUM CHLORIDE 1000 MG: 900 INJECTION INTRAVENOUS at 08:05

## 2024-09-27 RX ADMIN — IPRATROPIUM BROMIDE AND ALBUTEROL SULFATE 3 ML: 2.5; .5 SOLUTION RESPIRATORY (INHALATION) at 06:54

## 2024-09-27 RX ADMIN — SODIUM CHLORIDE 1000 ML: 9 INJECTION, SOLUTION INTRAVENOUS at 05:32

## 2024-09-27 RX ADMIN — SODIUM CHLORIDE 1000 ML: 9 INJECTION, SOLUTION INTRAVENOUS at 17:58

## 2024-09-27 RX ADMIN — AMLODIPINE BESYLATE 10 MG: 10 TABLET ORAL at 11:35

## 2024-09-27 RX ADMIN — INSULIN GLARGINE 10 UNITS: 100 INJECTION, SOLUTION SUBCUTANEOUS at 11:36

## 2024-09-27 RX ADMIN — INSULIN LISPRO 10 UNITS: 100 INJECTION, SOLUTION INTRAVENOUS; SUBCUTANEOUS at 18:01

## 2024-09-27 RX ADMIN — MAGNESIUM SULFATE HEPTAHYDRATE 1 G: 10 INJECTION, SOLUTION INTRAVENOUS at 05:40

## 2024-09-27 RX ADMIN — IPRATROPIUM BROMIDE AND ALBUTEROL SULFATE 3 ML: 2.5; .5 SOLUTION RESPIRATORY (INHALATION) at 05:25

## 2024-09-27 RX ADMIN — ASPIRIN 81 MG: 81 TABLET, COATED ORAL at 11:35

## 2024-09-27 RX ADMIN — INSULIN LISPRO 9 UNITS: 100 INJECTION, SOLUTION INTRAVENOUS; SUBCUTANEOUS at 18:01

## 2024-09-27 RX ADMIN — INSULIN LISPRO 8 UNITS: 100 INJECTION, SOLUTION INTRAVENOUS; SUBCUTANEOUS at 11:35

## 2024-09-27 RX ADMIN — INSULIN HUMAN 7 UNITS: 100 INJECTION, SOLUTION PARENTERAL at 06:37

## 2024-09-27 RX ADMIN — IPRATROPIUM BROMIDE AND ALBUTEROL SULFATE 3 ML: 2.5; .5 SOLUTION RESPIRATORY (INHALATION) at 19:30

## 2024-09-27 RX ADMIN — SODIUM CHLORIDE, POTASSIUM CHLORIDE, SODIUM LACTATE AND CALCIUM CHLORIDE 150 ML/HR: 600; 310; 30; 20 INJECTION, SOLUTION INTRAVENOUS at 23:24

## 2024-09-27 RX ADMIN — IPRATROPIUM BROMIDE AND ALBUTEROL SULFATE 3 ML: 2.5; .5 SOLUTION RESPIRATORY (INHALATION) at 13:35

## 2024-09-27 RX ADMIN — DOXYCYCLINE 100 MG: 100 INJECTION, POWDER, LYOPHILIZED, FOR SOLUTION INTRAVENOUS at 06:56

## 2024-09-27 RX ADMIN — DOXYCYCLINE 100 MG: 100 CAPSULE ORAL at 21:10

## 2024-09-27 RX ADMIN — SODIUM CHLORIDE, POTASSIUM CHLORIDE, SODIUM LACTATE AND CALCIUM CHLORIDE 150 ML/HR: 600; 310; 30; 20 INJECTION, SOLUTION INTRAVENOUS at 19:09

## 2024-09-27 RX ADMIN — Medication 10 ML: at 11:37

## 2024-09-27 RX ADMIN — INSULIN LISPRO 5 UNITS: 100 INJECTION, SOLUTION INTRAVENOUS; SUBCUTANEOUS at 21:11

## 2024-09-27 RX ADMIN — METOPROLOL TARTRATE 50 MG: 50 TABLET, FILM COATED ORAL at 11:35

## 2024-09-27 RX ADMIN — PANTOPRAZOLE SODIUM 40 MG: 40 TABLET, DELAYED RELEASE ORAL at 11:35

## 2024-09-27 RX ADMIN — METHYLPREDNISOLONE SODIUM SUCCINATE 125 MG: 125 INJECTION INTRAMUSCULAR; INTRAVENOUS at 05:34

## 2024-09-27 RX ADMIN — IPRATROPIUM BROMIDE AND ALBUTEROL SULFATE 3 ML: 2.5; .5 SOLUTION RESPIRATORY (INHALATION) at 16:29

## 2024-09-27 RX ADMIN — Medication 10 ML: at 21:20

## 2024-09-27 RX ADMIN — LOSARTAN POTASSIUM 25 MG: 25 TABLET, FILM COATED ORAL at 11:35

## 2024-09-27 RX ADMIN — SODIUM CHLORIDE, POTASSIUM CHLORIDE, SODIUM LACTATE AND CALCIUM CHLORIDE 100 ML/HR: 600; 310; 30; 20 INJECTION, SOLUTION INTRAVENOUS at 12:56

## 2024-09-27 RX ADMIN — ENOXAPARIN SODIUM 40 MG: 100 INJECTION SUBCUTANEOUS at 11:36

## 2024-09-27 NOTE — CASE MANAGEMENT/SOCIAL WORK
Discharge Planning Assessment  [facility]     Patient Name: Pati SIMON  MRN: [id number]  Today's Date: 9/27/2024    Admit Date: 9/27/2024    Plan: IDP   Discharge Needs Assessment       Row Name 09/27/24 1006       Living Environment    People in Home other (see comments)  Roommate    Current Living Arrangements home    Primary Care Provided by self    Able to Return to Prior Arrangements yes       Transition Planning    Patient/Family Anticipates Transition to home       Discharge Needs Assessment    Readmission Within the Last 30 Days no previous admission in last 30 days    Equipment Currently Used at Home none    Concerns to be Addressed denies needs/concerns at this time                   Discharge Plan       Row Name 09/27/24 1006       Plan    Plan IDP    Plan Comments MSW met with Pt at bedside to complete IDP. Pt lives with roommate in [facility]. Pt confirmed demographics and reports PCP is Anuradha Toledo. Pt is self-pay. Pt independent with ADLs; Pt reports no DME, HH, or home O2. Pt’s preferred pharmacy is [facility]. Pt still drives. Pt denied needs or concerns. CM will continue to follow.                  Continued Care and Services - Admitted Since 9/27/2024    No active coordination exists for this encounter.          Demographic Summary       Row Name 09/27/24 1005       General Information    Arrived From home    Referral Source admission list;emergency department    Reason for Consult discharge planning    Preferred Language English                   Functional Status       Row Name 09/27/24 1006       Functional Status    Usual Activity Tolerance good    Current Activity Tolerance good       Functional Status, IADL    Medications independent    Meal Preparation independent    Housekeeping independent    Laundry independent    Shopping independent                   Psychosocial    No documentation.                            VEENA Cobian

## 2024-09-27 NOTE — ED NOTES
Pati SIMON    Nursing Report ED to Floor:  Mental status: A&Ox4  Ambulatory status: Independent   Oxygen Therapy:  BiPap  Cardiac Rhythm: NSR/Sinus Tachycardia  Admitted from: ED  Safety Concerns:  None  Social Issues: None  ED Room #:  15    ED Nurse Phone Extension - [phone] or may call [phone].      HPI:   Chief Complaint   Patient presents with    Shortness of Breath       Past Medical History:  Past Medical History:   Diagnosis Date    Diabetes mellitus     GERD (gastroesophageal reflux disease)     Hypertension     Pneumonia     Urinary tract infection     Visual impairment         Past Surgical History:  Past Surgical History:   Procedure Laterality Date     SECTION      4        Admitting Doctor:   Yasmeen Corrales II, DO    Consulting Provider(s):  Consults       No orders found from 2024 to 2024.             Admitting Diagnosis:   The primary encounter diagnosis was Acute respiratory failure with hypoxia and hypercapnia. Diagnoses of Pneumonia of left upper lobe due to infectious organism, Acute kidney injury, Hyperglycemia, and Severe persistent asthma with exacerbation were also pertinent to this visit.    Most Recent Vitals:   Vitals:    24 0800 24 0900 24 0902 24 0947   BP: 111/70 117/68 117/68    Pulse: 93 105 105 101   Resp: 16 16     Temp:       TempSrc:       SpO2: 95% 97% 96% (!) 87%   Weight:       Height:           Active LDAs/IV Access:   Lines, Drains & Airways       Active LDAs       Name Placement date Placement time Site Days    Peripheral IV 24 Left Antecubital 24  Antecubital  less than 1                    Labs (abnormal labs have a star):   Labs Reviewed   COMPREHENSIVE METABOLIC PANEL - Abnormal; Notable for the following components:       Result Value    Glucose 592 (*)     Creatinine 0.52 (*)     Sodium 131 (*)     Chloride 89 (*)     AST (SGOT) 38 (*)     Alkaline Phosphatase 163 (*)     All other components within  normal limits    Narrative:     GFR Normal >60  Chronic Kidney Disease <60  Kidney Failure <15     BNP (IN-HOUSE) - Abnormal; Notable for the following components:    proBNP 6,196.0 (*)     All other components within normal limits    Narrative:     This assay is used as an aid in the diagnosis of individuals suspected of having heart failure. It can be used as an aid in the diagnosis of acute decompensated heart failure (ADHF) in patients presenting with signs and symptoms of ADHF to the emergency department (ED). In addition, NT-proBNP of <300 pg/mL indicates ADHF is not likely.    Age Range Result Interpretation  NT-proBNP Concentration (pg/mL:      <50             Positive            >450                   Gray                 300-450                    Negative             <300    50-75           Positive            >900                  Gray                300-900                  Negative            <300      >75             Positive            >1800                  Gray                300-1800                  Negative            <300   SINGLE HS TROPONIN T - Abnormal; Notable for the following components:    HS Troponin T 36 (*)     All other components within normal limits    Narrative:     High Sensitive Troponin T Reference Range:  <14.0 ng/L- Negative Female for AMI  <22.0 ng/L- Negative Male for AMI  >=14 - Abnormal Female indicating possible myocardial injury.  >=22 - Abnormal Male indicating possible myocardial injury.   Clinicians would have to utilize clinical acumen, EKG, Troponin, and serial changes to determine if it is an Acute Myocardial Infarction or myocardial injury due to an underlying chronic condition.        CBC WITH AUTO DIFFERENTIAL - Abnormal; Notable for the following components:    WBC 17.88 (*)     Hemoglobin 11.7 (*)     MCH 24.8 (*)     MCHC 31.0 (*)     RDW 15.9 (*)     Platelets 543 (*)     Neutrophil % 85.2 (*)     Lymphocyte % 5.7 (*)     Eosinophil % 0.1 (*)      Neutrophils, Absolute 15.22 (*)     Monocytes, Absolute 1.50 (*)     Immature Grans, Absolute 0.08 (*)     All other components within normal limits   BLOOD GAS, ARTERIAL W/CO-OXIMETRY - Abnormal; Notable for the following components:    pH, Arterial 7.331 (*)     pCO2, Arterial 58.8 (*)     pO2, Arterial 64.6 (*)     HCO3, Arterial 31.0 (*)     Base Excess, Arterial 3.8 (*)     Hemoglobin, Blood Gas 11.6 (*)     Hematocrit, Blood Gas 35.6 (*)     Oxyhemoglobin 86.0 (*)     Carboxyhemoglobin 5.2 (*)     pCO2, Temperature Corrected 58.8 (*)     pO2, Temperature Corrected 64.6 (*)     All other components within normal limits   LACTIC ACID, PLASMA - Abnormal; Notable for the following components:    Lactate 2.5 (*)     All other components within normal limits   BETA HYDROXYBUTYRATE QUANTITATIVE - Abnormal; Notable for the following components:    Beta-Hydroxybutyrate Quant 0.769 (*)     All other components within normal limits    Narrative:     In the assessment of possible diabetic ketoacidosis, the test should be interpreted along with other clinical and laboratory findings.  A level greater than 1 mmol/L should require further evaluation and levels of more than 3 mmol/L require immediate medical review.   RESPIRATORY PANEL PCR W/ COVID-19 (SARS-COV-2), NP SWAB IN UTM/VTP, 2 HR TAT - Normal    Narrative:     In the setting of a positive respiratory panel with a viral infection PLUS a negative procalcitonin without other underlying concern for bacterial infection, consider observing off antibiotics or discontinuation of antibiotics and continue supportive care. If the respiratory panel is positive for atypical bacterial infection (Bordetella pertussis, Chlamydophila pneumoniae, or Mycoplasma pneumoniae), consider antibiotic de-escalation to target atypical bacterial infection.   LACTIC ACID, REFLEX - Normal   BLOOD CULTURE   BLOOD CULTURE   RAINBOW DRAW    Narrative:     The following orders were created for panel  order [place] Draw.  Procedure                               Abnormality         Status                     ---------                               -----------         ------                     Green Top (Gel)[[id number]]                                  Final result               Lavender Top[[id number]]                                     Final result               Gold Top - SST[[id number]]                                   Final result               Jiang Top[[id number]]                                         Final result               Light Blue Top[[id number]]                                   Final result                 Please view results for these tests on the individual orders.   BLOOD GAS, ARTERIAL   POCT GLUCOSE FINGERSTICK   POCT GLUCOSE FINGERSTICK   POCT GLUCOSE FINGERSTICK   POCT GLUCOSE FINGERSTICK   CBC AND DIFFERENTIAL    Narrative:     The following orders were created for panel order CBC & Differential.  Procedure                               Abnormality         Status                     ---------                               -----------         ------                     CBC Auto Differential[[id number]]        Abnormal            Final result                 Please view results for these tests on the individual orders.   GREEN TOP   LAVENDER TOP   GOLD TOP - SST   GRAY TOP   LIGHT BLUE TOP       Meds Given in ED:   Medications   sodium chloride 0.9 % flush 10 mL (has no administration in time range)   cefTRIAXone (ROCEPHIN) 1,000 mg in sodium chloride 0.9 % 100 mL MBP (0 mg Intravenous Stopped 9/27/24 0838)   dextrose (GLUTOSE) oral gel 15 g (has no administration in time range)   dextrose (D50W) (25 g/50 mL) IV injection 25 g (has no administration in time range)   glucagon (GLUCAGEN) injection 1 mg (has no administration in time range)   insulin glargine (LANTUS, SEMGLEE) injection 10 Units (has no administration in time range)   Insulin Lispro (humaLOG) injection 2-9 Units (has no  administration in time range)   ipratropium-albuterol (DUO-NEB) nebulizer solution 3 mL (3 mL Nebulization Given 9/27/24 0525)   methylPREDNISolone sodium succinate (SOLU-Medrol) injection 125 mg (125 mg Intravenous Given 9/27/24 0534)   sodium chloride 0.9 % bolus 1,000 mL (0 mL Intravenous Stopped 9/27/24 0648)   magnesium sulfate in D5W 1g/100mL (PREMIX) (0 g Intravenous Stopped 9/27/24 0648)   doxycycline (VIBRAMYCIN) 100 mg in sodium chloride 0.9 % 100 mL MBP (0 mg Intravenous Stopped 9/27/24 0802)   insulin regular (humuLIN R,novoLIN R) injection 7 Units (7 Units Intravenous Given 9/27/24 0637)   ipratropium-albuterol (DUO-NEB) nebulizer solution 3 mL (3 mL Nebulization Given 9/27/24 0654)           Last NIH score:                                                          Dysphagia screening results:  Patient Factors Component (Dysphagia:Stroke or Rule-out)  Best Eye Response: 4-->(E4) spontaneous (09/27/24 0450)  Best Motor Response: 6-->(M6) obeys commands (09/27/24 0450)  Best Verbal Response: 5-->(V5) oriented (09/27/24 0450)  Susan Coma Scale Score: 15 (09/27/24 0450)     Susan Coma Scale:  No data recorded     CIWA:        Restraint Type:            Isolation Status:  No active isolations

## 2024-09-27 NOTE — ED PROVIDER NOTES
Subjective   History of Present Illness  64-year-old female with a history of asthma and diabetes presented to the emergency department with some shortness of breath.  The patient states that she had some trouble breathing that lasted for the last few hours.  Intensified over the last hour.  She has had a mild cough over the last 4 days.  Is been nonproductive in nature.  Patient states that she started having some significant wheezing tonight.  No mops this.  She denies any fevers or chills.  No headache or change in vision.  No focal weakness.  No chest pain.  No abdominal pain or vomiting    History provided by:  Patient and EMS personnel   used: No        Review of Systems   Constitutional:  Negative for chills and fever.   HENT:  Negative for congestion, ear pain and sore throat.    Eyes:  Negative for visual disturbance.   Respiratory:  Positive for cough, shortness of breath and wheezing.    Cardiovascular:  Negative for chest pain.   Gastrointestinal:  Negative for abdominal pain.   Genitourinary:  Negative for difficulty urinating.   Musculoskeletal:  Negative for arthralgias.   Skin:  Negative for rash.   Neurological:  Negative for dizziness, weakness and numbness.   Psychiatric/Behavioral:  Negative for agitation.        Past Medical History:   Diagnosis Date    Diabetes mellitus     GERD (gastroesophageal reflux disease)     Hypertension     Pneumonia     Urinary tract infection     Visual impairment        Allergies   Allergen Reactions    Codeine Irritability     jittering    Lipitor [Atorvastatin] Myalgia    Rosuvastatin Nausea Only       Past Surgical History:   Procedure Laterality Date     SECTION      4       Family History   Problem Relation Age of Onset    Diabetes Mother     Heart attack Mother 56    Diabetes Father     Heart attack Father     Diabetes Sister     Suicidality Maternal Grandmother     Heart attack Maternal Grandfather     Stroke Paternal Grandmother      Diabetes Paternal Grandmother     Alcohol abuse Paternal Grandfather     Heart disease Paternal Grandfather        Social History     Socioeconomic History    Marital status:    Tobacco Use    Smoking status: Every Day     Current packs/day: 1.00     Average packs/day: 1 pack/day for 42.7 years (42.7 ttl pk-yrs)     Types: Cigarettes     Start date: 1/1/1982     Passive exposure: Never    Smokeless tobacco: Never   Vaping Use    Vaping status: Never Used   Substance and Sexual Activity    Alcohol use: Not Currently     Alcohol/week: 1.0 standard drink of alcohol     Types: 1 Glasses of wine per week     Comment: rare    Drug use: Not Currently     Types: Marijuana     Comment: occ    Sexual activity: Not Currently     Partners: Male     Birth control/protection: Post-menopausal           Objective   Physical Exam  Vitals and nursing note reviewed.   Constitutional:       General: She is in acute distress.      Appearance: She is not ill-appearing or toxic-appearing.   HENT:      Mouth/Throat:      Pharynx: No posterior oropharyngeal erythema.   Eyes:      Conjunctiva/sclera: Conjunctivae normal.      Pupils: Pupils are equal, round, and reactive to light.   Cardiovascular:      Rate and Rhythm: Normal rate and regular rhythm.   Pulmonary:      Effort: Pulmonary effort is normal. Tachypnea and respiratory distress present.      Breath sounds: Wheezing present.   Abdominal:      General: Abdomen is flat. There is no distension.      Palpations: There is no mass.      Tenderness: There is no abdominal tenderness. There is no guarding or rebound.   Musculoskeletal:         General: No deformity. Normal range of motion.   Skin:     General: Skin is warm.      Findings: No rash.   Neurological:      General: No focal deficit present.      Mental Status: She is alert and oriented to person, place, and time.      Motor: No weakness.         ECG 12 Lead      Date/Time: 9/27/2024 5:31 AM    Performed by: Oscar  Chente CRAFT MD  Authorized by: Chente Moore MD  Interpreted by ED physician  Comparison: compared with previous ECG   Similar to previous ECG  Rhythm: sinus tachycardia  Ectopy: PVCs  Rate: tachycardic  BPM: 128  QRS axis: left  Conduction: conduction normal  Other findings comments: Nonspecific ST changes  Clinical impression: non-specific ECG  Comments: Interpretation:  EKG was directly visualized by myself, interpretations as documented in hospital course.               ED Course  ED Course as of 09/27/24 0635   Fri Sep 27, 2024   0534 BP: 163/90 [JK]   0534 Temp: 97.8 °F (36.6 °C) [JK]   0534 Heart Rate(!): 125 [JK]   0534 Resp(!): 35 [JK]   0534 SpO2(!): 77 % [JK]   0534 Device (Oxygen Therapy): room air  Interpretation:  Patient's repeat vitals, telemetry tracing, and pulse oximetry tracing were directly viewed and interpreted by myself.   O2 sat 77% on room air, interpreted as hypoxia  Telemetry rhythm strip revealed a rate of 125 bpm, interpreted as sinus tachycardia [JK]   0534 Blood Gas, Arterial With Co-Ox(!) [JK]   0535 Interpretation:  Patient's Blood Gas was directly viewed and interpreted by myself.   Acute respiratory acidosis [JK]   0537 Patient continues to have tachypnea despite initial breathing treatment and IV medications [JK]   0537 Patient was having significant respiratory distress on my examination. At this time it is most appropriate to start the patient on BiPAP therapy. Respiratory therapy was called to bedside. Patient was started on BiPAP with settings of IPAP of 10 and EPAP of 5 on 40% FiO2. [JK]   0556 XR Chest 1 View  Interpretation:  Imaging was directly visualized by myself, per my interpretations, x-ray of the chest showed infiltrate in the left lingula and upper lobe. [JK]   0556 Patient has imaging findings consistent with pneumonia.  We did start the patient on broad-spectrum antibiotic therapy. [JK]   0556 Patient did not receive full 30 cc/kg bolus even though sepsis  is a concern as she does have findings concerning for fluid overload.  She is hypertensive and has some pitting edema in the lower extremities.  We will proceed with judicious fluid administration [JK]   0622 Respiratory Panel PCR w/COVID-19(SARS-CoV-2) ARNAV/NAIMA/AUNG/PAD/COR/JE In-House, NP Swab in UTM/VTM, 2 HR TAT - Swab, Nasopharynx [JK]   0622 Lactic Acid, Plasma(!!) [JK]   0622 Single High Sensitivity Troponin T(!) [JK]   0622 Comprehensive Metabolic Panel(!!) [JK]   0622 CBC & Differential(!)  Interpretation:  Laboratory studies were reviewed and interpreted directly by myself.  CBC shows no significant leukocytosis with a white blood cell count of 17.88, CMP shows some hyperglycemia with a glucose of 592, lactic acid was elevated 2.5, BNP elevated 6196, troponin elevated at 36, respiratory panel normal [JK]   0633 On reevaluation, the patient's hemodynamics continue to improve with BiPAP therapy.  We will continue IV antibiotics as well.  Patient will be admitted to the hospital in critical condition [JK]   0633 Based on the patient's presentation, history and diffuse work-up in the emergency department, the patient is deemed appropriate for admission to the hospital for further evaluation and treatment.  This was discussed with the patient at bedside.  They are in agreement with the current medical management.    Admitting physician: Dr. Lopez    Discussion was had with admitting physician regarding the laboratory and imaging findings.  We did discuss current therapeutics in the emergency department and progression of the patient.  Working diagnosis was conveyed to the admitting physician, as well as current status and prognosis for the patient.  They are in agreement with these findings and have accepted admission.    Shared decision making:   After full review of the patient's clinical presentation, review of any work-up including but not limited to laboratory studies and radiology obtained, I had a  discussion with the patient.  Treatment options were discussed as well as the risks, benefits and consequences.  I discussed all findings with the patient and family members if available.  During the discussion, treatment goals were understood by all as well as any misconceptions which were addressed with the patient.  Ample time was given for any questions they may have had.  They are in agreement with the treatment plan as well as final disposition. [JK]      ED Course User Index  [JK] Chente Moore MD                                             Medical Decision Making  This is a 64-year-old female with a history of asthma and hypertension presented to the emergency department with some difficulty breathing.  Patient has diffuse wheezing on exam as well as tachypnea.  She appears to be in moderate respiratory distress.  Symptoms seem most consistent with acute bronchitis and asthma exacerbation.  Patient was initially hypoxic at 77% on room air.  Patient was immediately transferred to resuscitation bay. IV access was established and the patient.  Placed on continuous telemetry monitoring.  Patient was placed on supplemental oxygen and administered DuoNeb therapy as well as IV steroids, IV fluids and IV magnesium.  Given the patient's presentation, differential is broad and will require further evaluation.  Workup initiated.      Differential diagnosis: COPD exacerbation, asthma exacerbation, pneumonia, asthma, respiratory failure, URI, bronchitis      Amount and/or Complexity of Data Reviewed  Independent Historian: EMS  External Data Reviewed: labs, radiology, ECG and notes.     Details: External laboratories, imaging as well as notes were reviewed personally by myself.  All relevant studies were used to guide decision making.     Date of previous record: 12/28/2023    Source of note: Primary physician    Summary:  Patient was seen and evaluated for routine visit.  I did review basic laboratory studies on  file as well as a previous chest x-ray and EKG.  Records reviewed    Labs: ordered. Decision-making details documented in ED Course.  Radiology: ordered and independent interpretation performed. Decision-making details documented in ED Course.  ECG/medicine tests: ordered and independent interpretation performed. Decision-making details documented in ED Course.    Risk  OTC drugs.  Prescription drug management.  Decision regarding hospitalization.    Critical Care  Total time providing critical care: 45 minutes (Authorized and performed by: Chente Moore MD  I personally spent a total of 45 minutes of critical care time with the patient.  Due to the high probability of clinically significant, life-threatening deterioration, the patient required my highest level of care to intervene emergently.  These interventions, including, but not limited to, establishing IV access, continuous pulse oximeter and telemetry monitoring, frequent monitoring and reevaluations, management the patient's airway and cardiovascular system, discussion with other consultants as needed, which bear directly on the management the patient.  This also includes obtaining history, examining the patient, frequent reevaluations and coordinating high level of care.  Failure to emergently initiate these interventions would carry a high probability of resulting in sudden, clinically significant or life threatening deterioration in the patient's condition.  This does not include time spent on separately reported billable procedures.)        Final diagnoses:   Acute respiratory failure with hypoxia and hypercapnia   Pneumonia of left upper lobe due to infectious organism   Acute kidney injury   Hyperglycemia   Severe persistent asthma with exacerbation       ED Disposition  ED Disposition       ED Disposition   Decision to Admit    Condition   --    Comment   Level of Care: Telemetry [5]   Diagnosis: Pneumonia [[id number]]                 No follow-up  provider specified.       Medication List      No changes were made to your prescriptions during this visit.            Chente Moore MD  09/27/24 0674

## 2024-09-27 NOTE — ED NOTES
Pati SIMON    Nursing Report ED to Floor:  Mental status: A&OX4  Ambulatory status: HAS NOT AMBULATED IN ED  Oxygen Therapy:  BIPAP  Cardiac Rhythm: STACH  Admitted from: HOME  Safety Concerns:  FALL RISK   Social Issues: NA  ED Room #:  15    ED Nurse Phone Extension - [phone] or may call [phone].      HPI:   Chief Complaint   Patient presents with    Shortness of Breath       Past Medical History:  Past Medical History:   Diagnosis Date    Diabetes mellitus     GERD (gastroesophageal reflux disease)     Hypertension     Pneumonia     Urinary tract infection     Visual impairment         Past Surgical History:  Past Surgical History:   Procedure Laterality Date     SECTION      4        Admitting Doctor:   No admitting provider for patient encounter.    Consulting Provider(s):  Consults       No orders found from 2024 to 2024.             Admitting Diagnosis:   The primary encounter diagnosis was Acute respiratory failure with hypoxia and hypercapnia. Diagnoses of Pneumonia of left upper lobe due to infectious organism, Acute kidney injury, Hyperglycemia, and Severe persistent asthma with exacerbation were also pertinent to this visit.    Most Recent Vitals:   Vitals:    24 0452 24 0500 24 0525 24 0545   BP:       Pulse:  (!) 131  115   Resp:   28    Temp: 97.8 °F (36.6 °C)      TempSrc: Axillary      SpO2:  91%  97%   Weight:       Height:           Active LDAs/IV Access:   Lines, Drains & Airways       Active LDAs       Name Placement date Placement time Site Days    Peripheral IV 24 05 Left Antecubital 24  05  Antecubital  less than 1                    Labs (abnormal labs have a star):   Labs Reviewed   COMPREHENSIVE METABOLIC PANEL - Abnormal; Notable for the following components:       Result Value    Glucose 592 (*)     Creatinine 0.52 (*)     Sodium 131 (*)     Chloride 89 (*)     AST (SGOT) 38 (*)     Alkaline Phosphatase 163 (*)     All other  With this SN visit, patient presented alert and oriented x 3 and was pleasant and cooperative. Patient sat in chair at dining room table for assessment. Daughter present but in a WC. The skill of a nurse is required to assess wound right lower leg for sx infection/healing. Discussed DC from nursing services when goals are met. Patient verbalized understanding. components within normal limits    Narrative:     GFR Normal >60  Chronic Kidney Disease <60  Kidney Failure <15     BNP (IN-HOUSE) - Abnormal; Notable for the following components:    proBNP 6,196.0 (*)     All other components within normal limits    Narrative:     This assay is used as an aid in the diagnosis of individuals suspected of having heart failure. It can be used as an aid in the diagnosis of acute decompensated heart failure (ADHF) in patients presenting with signs and symptoms of ADHF to the emergency department (ED). In addition, NT-proBNP of <300 pg/mL indicates ADHF is not likely.    Age Range Result Interpretation  NT-proBNP Concentration (pg/mL:      <50             Positive            >450                   Gray                 300-450                    Negative             <300    50-75           Positive            >900                  Gray                300-900                  Negative            <300      >75             Positive            >1800                  Gray                300-1800                  Negative            <300   SINGLE HS TROPONIN T - Abnormal; Notable for the following components:    HS Troponin T 36 (*)     All other components within normal limits    Narrative:     High Sensitive Troponin T Reference Range:  <14.0 ng/L- Negative Female for AMI  <22.0 ng/L- Negative Male for AMI  >=14 - Abnormal Female indicating possible myocardial injury.  >=22 - Abnormal Male indicating possible myocardial injury.   Clinicians would have to utilize clinical acumen, EKG, Troponin, and serial changes to determine if it is an Acute Myocardial Infarction or myocardial injury due to an underlying chronic condition.        CBC WITH AUTO DIFFERENTIAL - Abnormal; Notable for the following components:    WBC 17.88 (*)     Hemoglobin 11.7 (*)     MCH 24.8 (*)     MCHC 31.0 (*)     RDW 15.9 (*)     Platelets 543 (*)     Neutrophil % 85.2 (*)     Lymphocyte % 5.7 (*)     Eosinophil % 0.1  (*)     Neutrophils, Absolute 15.22 (*)     Monocytes, Absolute 1.50 (*)     Immature Grans, Absolute 0.08 (*)     All other components within normal limits   BLOOD GAS, ARTERIAL W/CO-OXIMETRY - Abnormal; Notable for the following components:    pH, Arterial 7.331 (*)     pCO2, Arterial 58.8 (*)     pO2, Arterial 64.6 (*)     HCO3, Arterial 31.0 (*)     Base Excess, Arterial 3.8 (*)     Hemoglobin, Blood Gas 11.6 (*)     Hematocrit, Blood Gas 35.6 (*)     Oxyhemoglobin 86.0 (*)     Carboxyhemoglobin 5.2 (*)     pCO2, Temperature Corrected 58.8 (*)     pO2, Temperature Corrected 64.6 (*)     All other components within normal limits   LACTIC ACID, PLASMA - Abnormal; Notable for the following components:    Lactate 2.5 (*)     All other components within normal limits   RESPIRATORY PANEL PCR W/ COVID-19 (SARS-COV-2), NP SWAB IN UTM/VTP, 2 HR TAT - Normal    Narrative:     In the setting of a positive respiratory panel with a viral infection PLUS a negative procalcitonin without other underlying concern for bacterial infection, consider observing off antibiotics or discontinuation of antibiotics and continue supportive care. If the respiratory panel is positive for atypical bacterial infection (Bordetella pertussis, Chlamydophila pneumoniae, or Mycoplasma pneumoniae), consider antibiotic de-escalation to target atypical bacterial infection.   BLOOD CULTURE   BLOOD CULTURE   RAINBOW DRAW    Narrative:     The following orders were created for panel order Festus Draw.  Procedure                               Abnormality         Status                     ---------                               -----------         ------                     Green Top (Gel)[[id number]]                                  Final result               Lavender Top[[id number]]                                     Final result               Gold Top - [facility][[id number]]                                   Final result               Jiang Top[[id number]]                                          Final result               Light Blue Top[[id number]]                                   Final result                 Please view results for these tests on the individual orders.   BLOOD GAS, ARTERIAL   BETA HYDROXYBUTYRATE QUANTITATIVE   LACTIC ACID, REFLEX   CBC AND DIFFERENTIAL    Narrative:     The following orders were created for panel order CBC & Differential.  Procedure                               Abnormality         Status                     ---------                               -----------         ------                     CBC Auto Differential[[id number]]        Abnormal            Final result                 Please view results for these tests on the individual orders.   GREEN TOP   LAVENDER TOP   GOLD TOP - SST   GRAY TOP   LIGHT BLUE TOP       Meds Given in ED:   Medications   sodium chloride 0.9 % flush 10 mL (has no administration in time range)   doxycycline (VIBRAMYCIN) 100 mg in sodium chloride 0.9 % 100 mL MBP (has no administration in time range)   ipratropium-albuterol (DUO-NEB) nebulizer solution 3 mL (has no administration in time range)   ipratropium-albuterol (DUO-NEB) nebulizer solution 3 mL (3 mL Nebulization Given 9/27/24 0525)   methylPREDNISolone sodium succinate (SOLU-Medrol) injection 125 mg (125 mg Intravenous Given 9/27/24 0534)   sodium chloride 0.9 % bolus 1,000 mL (1,000 mL Intravenous New Bag 9/27/24 0532)   magnesium sulfate in D5W 1g/100mL (PREMIX) (1 g Intravenous New Bag 9/27/24 0540)   insulin regular (humuLIN R,novoLIN R) injection 7 Units (7 Units Intravenous Given 9/27/24 0637)           Last NIH score:                                                          Dysphagia screening results:  Patient Factors Component (Dysphagia:Stroke or Rule-out)  Best Eye Response: 4-->(E4) spontaneous (09/27/24 0450)  Best Motor Response: 6-->(M6) obeys commands (09/27/24 0450)  Best Verbal Response: 5-->(V5) oriented (09/27/24 0450)  Susan Coma  Scale Score: 15 (09/27/24 2374)     Susan Coma Scale:  No data recorded     CIWA:        Restraint Type:            Isolation Status:  No active isolations

## 2024-09-27 NOTE — H&P
"    [facility] Medicine Services  HISTORY AND PHYSICAL    Patient Name: Pati SIMON  : 1960  MRN: [id number]  Primary Care Physician: Anuradha Toledo APRN  Date of admission: 2024      Subjective   Subjective     Chief Complaint: SOA    HPI:  Pati SIMON is a 64 y.o. female presenting to ED with SOA. Says she started to feel ill about 1 week ago at which time \"felt like she had a cold going on.\" Over the week continued to feel weaker. Developed SOA which hit suddenly late last night prompting her to come to ED. Feels better now after receiving tx in ED. Denies cough, high fevers.      Personal History     Past Medical History:   Diagnosis Date    Diabetes mellitus     GERD (gastroesophageal reflux disease)     Hypertension     Pneumonia     Urinary tract infection     Visual impairment            Past Surgical History:   Procedure Laterality Date     SECTION      4       Family History: family history includes Alcohol abuse in her paternal grandfather; Diabetes in her father, mother, paternal grandmother, and sister; Heart attack in her father and maternal grandfather; Heart attack (age of onset: 56) in her mother; Heart disease in her paternal grandfather; Stroke in her paternal grandmother; Suicidality in her maternal grandmother.     Social History:  reports that she has been smoking cigarettes. She started smoking about 42 years ago. She has a 42.7 pack-year smoking history. She has never been exposed to tobacco smoke. She has never used smokeless tobacco. She reports that she does not currently use alcohol after a past usage of about 1.0 standard drink of alcohol per week. She reports that she does not currently use drugs after having used the following drugs: Marijuana.  Social History     Social History Narrative    Not on file       Medications:  Available home medication information reviewed.  Calcium Citrate-Vitamin D3, SITagliptin, albuterol sulfate " HFA, amLODIPine, aspirin, cyclobenzaprine, ezetimibe, glipizide, hydroCHLOROthiazide, losartan, metFORMIN, metoprolol tartrate, nicotine polacrilex, and omeprazole    Allergies   Allergen Reactions    Codeine Irritability     jittering    Lipitor [Atorvastatin] Myalgia    Rosuvastatin Nausea Only       Objective   Objective     Vital Signs:   Temp:  [97.8 °F (36.6 °C)] 97.8 °F (36.6 °C)  Heart Rate:  [] 105  Resp:  [16-34] 16  BP: (107-163)/(61-90) 117/68  Flow (L/min):  [4-6] 4       Physical Exam   Constitutional: Awake, alert, currently appears comfortable  Eyes: PERRLA, sclerae anicteric, no conjunctival injection  HENT: NCAT, mucous membranes moist, bipap  Neck: Supple, no thyromegaly, no lymphadenopathy, trachea midline  Respiratory: Left sided rhonchi, scant wheezes  Cardiovascular: RRR, no murmurs, rubs, or gallops, palpable pedal pulses bilaterally  Gastrointestinal: Positive bowel sounds, soft, nontender, nondistended  Musculoskeletal: No bilateral ankle edema, no clubbing or cyanosis to extremities  Psychiatric: Appropriate affect, cooperative  Neurologic: Oriented x 3, strength symmetric in all extremities, Cranial Nerves grossly intact to confrontation, speech clear  Skin: No rashes     Result Review:  I have personally reviewed the results from the time of this admission to 9/27/2024 09:35 EDT and agree with these findings:  []  Laboratory list / accordion  []  Microbiology  []  Radiology  []  EKG/Telemetry   []  Cardiology/Vascular   []  Pathology  []  Old records  []  Other:  Most notable findings include:       LAB RESULTS:      Lab 09/27/24  0900 09/27/24  0523   WBC  --  17.88*   HEMOGLOBIN  --  11.7*   HEMATOCRIT  --  37.7   PLATELETS  --  543*   NEUTROS ABS  --  15.22*   IMMATURE GRANS (ABS)  --  0.08*   LYMPHS ABS  --  1.02   MONOS ABS  --  1.50*   EOS ABS  --  0.02   MCV  --  80.0   LACTATE 1.2 2.5*         Lab 09/27/24  0523   SODIUM 131*   POTASSIUM 4.1   CHLORIDE 89*   CO2 29.0    ANION GAP 13.0   BUN 10   CREATININE 0.52*   EGFR 104.5   GLUCOSE 592*   CALCIUM 9.1         Lab 09/27/24  0523   TOTAL PROTEIN 7.0   ALBUMIN 3.6   GLOBULIN 3.4   ALT (SGPT) 16   AST (SGOT) 38*   BILIRUBIN 0.4   ALK PHOS 163*         Lab 09/27/24  0523   PROBNP 6,196.0*   HSTROP T 36*                 Lab 09/27/24  0532   PH, ARTERIAL 7.331*   PCO2, ARTERIAL 58.8*   PO2 ART 64.6*   FIO2 36   HCO3 ART 31.0*   BASE EXCESS ART 3.8*   CARBOXYHEMOGLOBIN 5.2*         Microbiology Results (last 10 days)       Procedure Component Value - Date/Time    Respiratory Panel PCR w/COVID-19(SARS-CoV-2) ARNAV/NAIMA/AUNG/PAD/COR/JE In-House, NP Swab in UTM/VTM, 2 HR TAT - Swab, Nasopharynx [[id number]]  (Normal) Collected: 09/27/24 0524    Lab Status: Final result Specimen: Swab from Nasopharynx Updated: 09/27/24 0621     ADENOVIRUS, PCR Not Detected     Coronavirus 229E Not Detected     Coronavirus HKU1 Not Detected     Coronavirus NL63 Not Detected     Coronavirus OC43 Not Detected     COVID19 Not Detected     Human Metapneumovirus Not Detected     Human Rhinovirus/Enterovirus Not Detected     Influenza A PCR Not Detected     Influenza B PCR Not Detected     Parainfluenza Virus 1 Not Detected     Parainfluenza Virus 2 Not Detected     Parainfluenza Virus 3 Not Detected     Parainfluenza Virus 4 Not Detected     RSV, PCR Not Detected     Bordetella pertussis pcr Not Detected     Bordetella parapertussis PCR Not Detected     Chlamydophila pneumoniae PCR Not Detected     Mycoplasma pneumo by PCR Not Detected    Narrative:      In the setting of a positive respiratory panel with a viral infection PLUS a negative procalcitonin without other underlying concern for bacterial infection, consider observing off antibiotics or discontinuation of antibiotics and continue supportive care. If the respiratory panel is positive for atypical bacterial infection (Bordetella pertussis, Chlamydophila pneumoniae, or Mycoplasma pneumoniae), consider  antibiotic de-escalation to target atypical bacterial infection.            XR Chest 1 View    Result Date: 9/27/2024  XR CHEST 1 VW Date of Exam: 9/27/2024 5:00 AM EDT Indication: SOA triage protocol Comparison: None available. Findings: The heart is enlarged. The pulmonary vascular markings are normal. There is dense consolidation involving the lingular segment of the left upper lobe and portions of the left lower lobe. The osseous structures are normal.     Impression: Impression: Dense consolidation of the lingular segment of the left upper lobe and portions of the left lower lobe. Electronically Signed: Truman Tejada MD  9/27/2024 5:12 AM EDT  Workstation ID: [id number]         Assessment & Plan   Assessment & Plan       Pneumonia    Uncontrolled type 2 diabetes mellitus with hyperglycemia    Primary hypertension    Dyslipidemia, goal LDL below 70    Acute respiratory failure with hypoxia    Sepsis    Elevated troponin    Lactic acid increased    63 y/o female presenting to ED with sudden onset SOA at home after feeling poorly for 1 week found to have sepsis due to left lung pna.    Sepsis due to PNA  Acute hypoxic respiratory failuore due to left lung CAP  Elevated lactate  --Rec'd only IV doxy on ED. Will start CTX and continue oral doxy. Rec'd 1L bolus NS, continue IVF.  --Cultures, urinary ag pending.  --Tylenol, nebs.    Elevated troponin  --Likely due to demand related to above. No CP, no ischemic EKG changes.     Uncontrolled DM w/ hyperglycemia  --Glucose markedly elevated on arrival and also received IV steroids. IVF as above, start SQ basal insulin + SSI.   --May need insulin at d/c.    HTN  HLD    VTE Prophylaxis:  Pharmacologic VTE prophylaxis orders are signed & held.            CODE STATUS:    Code Status and Medical Interventions: CPR (Attempt to Resuscitate); Full Support   Ordered at: 09/27/24 0713     Code Status (Patient has no pulse and is not breathing):    CPR (Attempt to Resuscitate)      Medical Interventions (Patient has pulse or is breathing):    Full Support       Expected Discharge   Expected discharge date/ time has not been documented.     Yasmeen Corrales II, DO  09/27/24

## 2024-09-28 ENCOUNTER — APPOINTMENT (OUTPATIENT)
Dept: CARDIOLOGY | Facility: HOSPITAL | Age: 64
DRG: 871 | End: 2024-09-28
Payer: MEDICAID

## 2024-09-28 ENCOUNTER — APPOINTMENT (OUTPATIENT)
Dept: CT IMAGING | Facility: HOSPITAL | Age: 64
DRG: 871 | End: 2024-09-28
Payer: MEDICAID

## 2024-09-28 PROBLEM — R91.8 MASS OF UPPER LOBE OF LEFT LUNG: Status: ACTIVE | Noted: 2024-09-28

## 2024-09-28 LAB
ANION GAP SERPL CALCULATED.3IONS-SCNC: 7 MMOL/L (ref 5–15)
BH CV ECHO MEAS - AO MAX PG: 3.9 MMHG
BH CV ECHO MEAS - AO MEAN PG: 2 MMHG
BH CV ECHO MEAS - AO ROOT DIAM: 3.1 CM
BH CV ECHO MEAS - AO V2 MAX: 98.3 CM/SEC
BH CV ECHO MEAS - AO V2 VTI: 22.6 CM
BH CV ECHO MEAS - AVA(I,D): 1.63 CM2
BH CV ECHO MEAS - EDV(CUBED): 185.2 ML
BH CV ECHO MEAS - EDV(MOD-SP2): 215 ML
BH CV ECHO MEAS - EDV(MOD-SP4): 145 ML
BH CV ECHO MEAS - EF(MOD-BP): 21.3 %
BH CV ECHO MEAS - EF(MOD-SP2): 19.5 %
BH CV ECHO MEAS - EF(MOD-SP4): 20 %
BH CV ECHO MEAS - ESV(CUBED): 216 ML
BH CV ECHO MEAS - ESV(MOD-SP2): 173 ML
BH CV ECHO MEAS - ESV(MOD-SP4): 116 ML
BH CV ECHO MEAS - FS: -5.3 %
BH CV ECHO MEAS - IVS/LVPW: 0.62 CM
BH CV ECHO MEAS - IVSD: 0.8 CM
BH CV ECHO MEAS - LA DIMENSION: 4.1 CM
BH CV ECHO MEAS - LAT PEAK E' VEL: 13.4 CM/SEC
BH CV ECHO MEAS - LV MASS(C)D: 241.3 GRAMS
BH CV ECHO MEAS - LV MAX PG: 1.28 MMHG
BH CV ECHO MEAS - LV MEAN PG: 1 MMHG
BH CV ECHO MEAS - LV V1 MAX: 56.5 CM/SEC
BH CV ECHO MEAS - LV V1 VTI: 13 CM
BH CV ECHO MEAS - LVIDD: 5.7 CM
BH CV ECHO MEAS - LVIDS: 6 CM
BH CV ECHO MEAS - LVOT AREA: 2.8 CM2
BH CV ECHO MEAS - LVOT DIAM: 1.9 CM
BH CV ECHO MEAS - LVPWD: 1.3 CM
BH CV ECHO MEAS - MED PEAK E' VEL: 4.4 CM/SEC
BH CV ECHO MEAS - MR MAX PG: 61.6 MMHG
BH CV ECHO MEAS - MR MAX VEL: 392.5 CM/SEC
BH CV ECHO MEAS - MR MEAN PG: 40 MMHG
BH CV ECHO MEAS - MR MEAN VEL: 296 CM/SEC
BH CV ECHO MEAS - MR VTI: 124 CM
BH CV ECHO MEAS - MV DEC SLOPE: 537 CM/SEC2
BH CV ECHO MEAS - MV DEC TIME: 0.21 SEC
BH CV ECHO MEAS - MV E MAX VEL: 108.5 CM/SEC
BH CV ECHO MEAS - MV MAX PG: 5.7 MMHG
BH CV ECHO MEAS - MV MEAN PG: 2 MMHG
BH CV ECHO MEAS - MV V2 VTI: 29 CM
BH CV ECHO MEAS - MVA(VTI): 1.27 CM2
BH CV ECHO MEAS - PA ACC TIME: 0.14 SEC
BH CV ECHO MEAS - PA V2 MAX: 76.6 CM/SEC
BH CV ECHO MEAS - RAP SYSTOLE: 8 MMHG
BH CV ECHO MEAS - RF(MV,LVOT)(1DIAM): 0.81 CM
BH CV ECHO MEAS - RVSP: 38 MMHG
BH CV ECHO MEAS - SV(LVOT): 36.9 ML
BH CV ECHO MEAS - SV(MOD-SP2): 42 ML
BH CV ECHO MEAS - SV(MOD-SP4): 29 ML
BH CV ECHO MEAS - TAPSE (>1.6): 2.6 CM
BH CV ECHO MEAS - TR MAX PG: 30.3 MMHG
BH CV ECHO MEAS - TR MAX VEL: 274.4 CM/SEC
BH CV ECHO MEASUREMENTS AVERAGE E/E' RATIO: 12.19
BH CV VAS BP RIGHT ARM: NORMAL MMHG
BH CV XLRA - RV BASE: 3.5 CM
BH CV XLRA - RV LENGTH: 7.9 CM
BH CV XLRA - RV MID: 2.8 CM
BH CV XLRA - TDI S': 14 CM/SEC
BUN SERPL-MCNC: 20 MG/DL (ref 8–23)
BUN/CREAT SERPL: 38.5 (ref 7–25)
CALCIUM SPEC-SCNC: 9.2 MG/DL (ref 8.6–10.5)
CHLORIDE SERPL-SCNC: 94 MMOL/L (ref 98–107)
CHOLEST SERPL-MCNC: 146 MG/DL (ref 0–200)
CO2 SERPL-SCNC: 30 MMOL/L (ref 22–29)
CREAT SERPL-MCNC: 0.52 MG/DL (ref 0.57–1)
CRP SERPL-MCNC: 4.3 MG/DL (ref 0–0.5)
DEPRECATED RDW RBC AUTO: 45.8 FL (ref 37–54)
EGFRCR SERPLBLD CKD-EPI 2021: 104.5 ML/MIN/1.73
ERYTHROCYTE [DISTWIDTH] IN BLOOD BY AUTOMATED COUNT: 15.6 % (ref 12.3–15.4)
GLUCOSE BLDC GLUCOMTR-MCNC: 153 MG/DL (ref 70–130)
GLUCOSE BLDC GLUCOMTR-MCNC: 180 MG/DL (ref 70–130)
GLUCOSE BLDC GLUCOMTR-MCNC: 297 MG/DL (ref 70–130)
GLUCOSE BLDC GLUCOMTR-MCNC: 322 MG/DL (ref 70–130)
GLUCOSE BLDC GLUCOMTR-MCNC: 356 MG/DL (ref 70–130)
GLUCOSE BLDC GLUCOMTR-MCNC: 403 MG/DL (ref 70–130)
GLUCOSE SERPL-MCNC: 143 MG/DL (ref 65–99)
HBA1C MFR BLD: 12.1 % (ref 4.8–5.6)
HCT VFR BLD AUTO: 32.9 % (ref 34–46.6)
HDLC SERPL-MCNC: 44 MG/DL (ref 40–60)
HGB BLD-MCNC: 10.2 G/DL (ref 12–15.9)
LDLC SERPL CALC-MCNC: 93 MG/DL (ref 0–100)
LDLC/HDLC SERPL: 2.14 {RATIO}
LEFT ATRIUM VOLUME INDEX: 35.4 ML/M2
MCH RBC QN AUTO: 25.1 PG (ref 26.6–33)
MCHC RBC AUTO-ENTMCNC: 31 G/DL (ref 31.5–35.7)
MCV RBC AUTO: 81 FL (ref 79–97)
MRSA DNA SPEC QL NAA+PROBE: NEGATIVE
NT-PROBNP SERPL-MCNC: 8460 PG/ML (ref 0–900)
PLATELET # BLD AUTO: 428 10*3/MM3 (ref 140–450)
PMV BLD AUTO: 9.9 FL (ref 6–12)
POTASSIUM SERPL-SCNC: 4.5 MMOL/L (ref 3.5–5.2)
PROCALCITONIN SERPL-MCNC: 0.19 NG/ML (ref 0–0.25)
RBC # BLD AUTO: 4.06 10*6/MM3 (ref 3.77–5.28)
SODIUM SERPL-SCNC: 131 MMOL/L (ref 136–145)
TRIGL SERPL-MCNC: 39 MG/DL (ref 0–150)
VLDLC SERPL-MCNC: 9 MG/DL (ref 5–40)
WBC NRBC COR # BLD AUTO: 16.19 10*3/MM3 (ref 3.4–10.8)

## 2024-09-28 PROCEDURE — 93306 TTE W/DOPPLER COMPLETE: CPT | Performed by: INTERNAL MEDICINE

## 2024-09-28 PROCEDURE — 84145 PROCALCITONIN (PCT): CPT | Performed by: INTERNAL MEDICINE

## 2024-09-28 PROCEDURE — 80048 BASIC METABOLIC PNL TOTAL CA: CPT | Performed by: INTERNAL MEDICINE

## 2024-09-28 PROCEDURE — 93306 TTE W/DOPPLER COMPLETE: CPT

## 2024-09-28 PROCEDURE — 63710000001 INSULIN GLARGINE PER 5 UNITS: Performed by: INTERNAL MEDICINE

## 2024-09-28 PROCEDURE — 99232 SBSQ HOSP IP/OBS MODERATE 35: CPT | Performed by: INTERNAL MEDICINE

## 2024-09-28 PROCEDURE — 25510000001 IOPAMIDOL PER 1 ML: Performed by: INTERNAL MEDICINE

## 2024-09-28 PROCEDURE — 87641 MR-STAPH DNA AMP PROBE: CPT | Performed by: INTERNAL MEDICINE

## 2024-09-28 PROCEDURE — 63710000001 INSULIN LISPRO (HUMAN) PER 5 UNITS: Performed by: INTERNAL MEDICINE

## 2024-09-28 PROCEDURE — 94799 UNLISTED PULMONARY SVC/PX: CPT

## 2024-09-28 PROCEDURE — 25010000002 SULFUR HEXAFLUORIDE MICROSPH 60.7-25 MG RECONSTITUTED SUSPENSION: Performed by: INTERNAL MEDICINE

## 2024-09-28 PROCEDURE — 25010000002 CEFTRIAXONE PER 250 MG: Performed by: INTERNAL MEDICINE

## 2024-09-28 PROCEDURE — 25010000002 METHYLPREDNISOLONE PER 40 MG: Performed by: INTERNAL MEDICINE

## 2024-09-28 PROCEDURE — 94761 N-INVAS EAR/PLS OXIMETRY MLT: CPT

## 2024-09-28 PROCEDURE — 94664 DEMO&/EVAL PT USE INHALER: CPT

## 2024-09-28 PROCEDURE — 83880 ASSAY OF NATRIURETIC PEPTIDE: CPT | Performed by: INTERNAL MEDICINE

## 2024-09-28 PROCEDURE — 25810000003 LACTATED RINGERS PER 1000 ML: Performed by: INTERNAL MEDICINE

## 2024-09-28 PROCEDURE — 80061 LIPID PANEL: CPT | Performed by: INTERNAL MEDICINE

## 2024-09-28 PROCEDURE — 82948 REAGENT STRIP/BLOOD GLUCOSE: CPT

## 2024-09-28 PROCEDURE — 85027 COMPLETE CBC AUTOMATED: CPT | Performed by: INTERNAL MEDICINE

## 2024-09-28 PROCEDURE — 25010000002 FUROSEMIDE PER 20 MG: Performed by: INTERNAL MEDICINE

## 2024-09-28 PROCEDURE — 25010000002 ENOXAPARIN PER 10 MG: Performed by: INTERNAL MEDICINE

## 2024-09-28 PROCEDURE — 83036 HEMOGLOBIN GLYCOSYLATED A1C: CPT | Performed by: INTERNAL MEDICINE

## 2024-09-28 PROCEDURE — 86140 C-REACTIVE PROTEIN: CPT | Performed by: INTERNAL MEDICINE

## 2024-09-28 PROCEDURE — 71275 CT ANGIOGRAPHY CHEST: CPT

## 2024-09-28 RX ORDER — INSULIN LISPRO 100 [IU]/ML
11 INJECTION, SOLUTION INTRAVENOUS; SUBCUTANEOUS ONCE
Status: COMPLETED | OUTPATIENT
Start: 2024-09-28 | End: 2024-09-28

## 2024-09-28 RX ORDER — METHYLPREDNISOLONE SODIUM SUCCINATE 40 MG/ML
40 INJECTION, POWDER, LYOPHILIZED, FOR SOLUTION INTRAMUSCULAR; INTRAVENOUS EVERY 12 HOURS
Status: DISCONTINUED | OUTPATIENT
Start: 2024-09-28 | End: 2024-09-28

## 2024-09-28 RX ORDER — MIDODRINE HYDROCHLORIDE 5 MG/1
5 TABLET ORAL
Status: DISCONTINUED | OUTPATIENT
Start: 2024-09-28 | End: 2024-10-10 | Stop reason: HOSPADM

## 2024-09-28 RX ORDER — GUAIFENESIN 600 MG/1
1200 TABLET, EXTENDED RELEASE ORAL EVERY 12 HOURS SCHEDULED
Status: DISCONTINUED | OUTPATIENT
Start: 2024-09-28 | End: 2024-10-10 | Stop reason: HOSPADM

## 2024-09-28 RX ORDER — IOPAMIDOL 755 MG/ML
85 INJECTION, SOLUTION INTRAVASCULAR
Status: COMPLETED | OUTPATIENT
Start: 2024-09-28 | End: 2024-09-28

## 2024-09-28 RX ORDER — FUROSEMIDE 10 MG/ML
20 INJECTION INTRAMUSCULAR; INTRAVENOUS
Status: DISCONTINUED | OUTPATIENT
Start: 2024-09-28 | End: 2024-09-29

## 2024-09-28 RX ADMIN — SODIUM CHLORIDE 1000 MG: 900 INJECTION INTRAVENOUS at 09:42

## 2024-09-28 RX ADMIN — SULFUR HEXAFLUORIDE 2 ML: KIT at 15:41

## 2024-09-28 RX ADMIN — DOXYCYCLINE 100 MG: 100 CAPSULE ORAL at 09:42

## 2024-09-28 RX ADMIN — SODIUM CHLORIDE, POTASSIUM CHLORIDE, SODIUM LACTATE AND CALCIUM CHLORIDE 150 ML/HR: 600; 310; 30; 20 INJECTION, SOLUTION INTRAVENOUS at 06:55

## 2024-09-28 RX ADMIN — ENOXAPARIN SODIUM 40 MG: 100 INJECTION SUBCUTANEOUS at 09:43

## 2024-09-28 RX ADMIN — IPRATROPIUM BROMIDE AND ALBUTEROL SULFATE 3 ML: 2.5; .5 SOLUTION RESPIRATORY (INHALATION) at 08:16

## 2024-09-28 RX ADMIN — DOXYCYCLINE 100 MG: 100 CAPSULE ORAL at 21:21

## 2024-09-28 RX ADMIN — INSULIN LISPRO 6 UNITS: 100 INJECTION, SOLUTION INTRAVENOUS; SUBCUTANEOUS at 12:56

## 2024-09-28 RX ADMIN — Medication 10 ML: at 09:45

## 2024-09-28 RX ADMIN — ASPIRIN 81 MG: 81 TABLET, COATED ORAL at 09:42

## 2024-09-28 RX ADMIN — IPRATROPIUM BROMIDE AND ALBUTEROL SULFATE 3 ML: 2.5; .5 SOLUTION RESPIRATORY (INHALATION) at 12:25

## 2024-09-28 RX ADMIN — INSULIN GLARGINE 10 UNITS: 100 INJECTION, SOLUTION SUBCUTANEOUS at 21:27

## 2024-09-28 RX ADMIN — PANTOPRAZOLE SODIUM 40 MG: 40 TABLET, DELAYED RELEASE ORAL at 06:20

## 2024-09-28 RX ADMIN — Medication 12.5 MG: at 18:17

## 2024-09-28 RX ADMIN — INSULIN LISPRO 11 UNITS: 100 INJECTION, SOLUTION INTRAVENOUS; SUBCUTANEOUS at 16:35

## 2024-09-28 RX ADMIN — GUAIFENESIN 1200 MG: 600 TABLET, EXTENDED RELEASE ORAL at 21:21

## 2024-09-28 RX ADMIN — IOPAMIDOL 75 ML: 755 INJECTION, SOLUTION INTRAVENOUS at 12:18

## 2024-09-28 RX ADMIN — EMPAGLIFLOZIN 10 MG: 10 TABLET, FILM COATED ORAL at 18:17

## 2024-09-28 RX ADMIN — METHYLPREDNISOLONE SODIUM SUCCINATE 40 MG: 40 INJECTION, POWDER, FOR SOLUTION INTRAMUSCULAR; INTRAVENOUS at 11:52

## 2024-09-28 RX ADMIN — INSULIN LISPRO 9 UNITS: 100 INJECTION, SOLUTION INTRAVENOUS; SUBCUTANEOUS at 16:33

## 2024-09-28 RX ADMIN — INSULIN GLARGINE 10 UNITS: 100 INJECTION, SOLUTION SUBCUTANEOUS at 09:44

## 2024-09-28 RX ADMIN — IPRATROPIUM BROMIDE AND ALBUTEROL SULFATE 3 ML: 2.5; .5 SOLUTION RESPIRATORY (INHALATION) at 16:40

## 2024-09-28 RX ADMIN — GUAIFENESIN 1200 MG: 600 TABLET, EXTENDED RELEASE ORAL at 09:42

## 2024-09-28 RX ADMIN — Medication 10 ML: at 21:21

## 2024-09-28 RX ADMIN — MIDODRINE HYDROCHLORIDE 5 MG: 5 TABLET ORAL at 18:17

## 2024-09-28 RX ADMIN — FUROSEMIDE 20 MG: 10 INJECTION, SOLUTION INTRAMUSCULAR; INTRAVENOUS at 15:24

## 2024-09-28 RX ADMIN — INSULIN LISPRO 7 UNITS: 100 INJECTION, SOLUTION INTRAVENOUS; SUBCUTANEOUS at 21:20

## 2024-09-28 RX ADMIN — IPRATROPIUM BROMIDE AND ALBUTEROL SULFATE 3 ML: 2.5; .5 SOLUTION RESPIRATORY (INHALATION) at 19:27

## 2024-09-28 RX ADMIN — INSULIN LISPRO 2 UNITS: 100 INJECTION, SOLUTION INTRAVENOUS; SUBCUTANEOUS at 09:43

## 2024-09-28 NOTE — PROGRESS NOTES
[facility] Medicine Services  PROGRESS NOTE    Patient Name: Pati SIMON  : 1960  MRN: [id number]    Date of Admission: 2024  Primary Care Physician: Anuradha Toledo APRN    Subjective   Subjective     CC:  For shortness of breath    HPI:  Patient was seen and examined this morning.  Reported to me that she started having swelling in her legs 2 weeks ago.  Still short of breath and wheezing at the time of the encounter.  Bedside ultrasound with newly diagnosed systolic dysfunction.  Patient denied any previous history of congestive heart failure.      Objective   Objective     Vital Signs:   Temp:  [97.6 °F (36.4 °C)-98.1 °F (36.7 °C)] 97.9 °F (36.6 °C)  Heart Rate:  [44-85] 80  Resp:  [18-24] 24  BP: (82-97)/(54-71) 97/71  Flow (L/min):  [2-3] 2     Physical Exam:  General: Comfortable, not in distress, conversant and cooperative  Head: Atraumatic and normocephalic  Eyes: No Icterus. No pallor  Ears:  Ears appear intact with no abnormalities noted  Throat: No oral lesions, no thrush  Neck: Supple, trachea midline  Lungs: Markedly diminished air entry left lung base.  Bilateral wheezing  Heart:  Normal S1 and S2, no murmur, no gallop, No JVD, 3+ lower extremity swelling  Abdomen:  Soft, no tenderness, no organomegaly, normal bowel sounds, no organomegaly  Extremities: pulses equal bilaterally  Skin: No bleeding, bruising or rash, normal skin turgor and elasticity  Neurologic: Cranial nerves appear intact with no evidence of facial asymmetry, normal motor and sensory functions in all 4 extremities  Psych: Alert and oriented x 3, normal mood    Results Reviewed:  LAB RESULTS:      Lab 24  0401 24  0900 24  0523   WBC 16.19*  --  17.88*   HEMOGLOBIN 10.2*  --  11.7*   HEMATOCRIT 32.9*  --  37.7   PLATELETS 428  --  543*   NEUTROS ABS  --   --  15.22*   IMMATURE GRANS (ABS)  --   --  0.08*   LYMPHS ABS  --   --  1.02   MONOS ABS  --   --  1.50*   EOS ABS   --   --  0.02   MCV 81.0  --  80.0   CRP 4.30*  --   --    PROCALCITONIN 0.19  --   --    LACTATE  --  1.2 2.5*   HSTROP T  --   --  36*         Lab 09/28/24  0401 09/27/24  0523   SODIUM 131* 131*   POTASSIUM 4.5 4.1   CHLORIDE 94* 89*   CO2 30.0* 29.0   ANION GAP 7.0 13.0   BUN 20 10   CREATININE 0.52* 0.52*   EGFR 104.5 104.5   GLUCOSE 143* 592*   CALCIUM 9.2 9.1         Lab 09/27/24  0523   TOTAL PROTEIN 7.0   ALBUMIN 3.6   GLOBULIN 3.4   ALT (SGPT) 16   AST (SGOT) 38*   BILIRUBIN 0.4   ALK PHOS 163*         Lab 09/27/24  0523   PROBNP 6,196.0*   HSTROP T 36*                 Lab 09/27/24  0532   PH, ARTERIAL 7.331*   PCO2, ARTERIAL 58.8*   PO2 ART 64.6*   FIO2 36   HCO3 ART 31.0*   BASE EXCESS ART 3.8*   CARBOXYHEMOGLOBIN 5.2*     Brief Urine Lab Results       None            Microbiology Results Abnormal       Procedure Component Value - Date/Time    Blood Culture - Blood, Arm, Right [[id number]]  (Normal) Collected: 09/27/24 0601    Lab Status: Preliminary result Specimen: Blood from Arm, Right Updated: 09/28/24 0645     Blood Culture No growth at 24 hours    Narrative:      Less than seven (7) mL's of blood was collected.  Insufficient quantity may yield false negative results.    Blood Culture - Blood, Arm, Left [[id number]]  (Normal) Collected: 09/27/24 0459    Lab Status: Preliminary result Specimen: Blood from Arm, Left Updated: 09/28/24 0645     Blood Culture No growth at 24 hours    Narrative:      Less than seven (7) mL's of blood was collected.  Insufficient quantity may yield false negative results.    S. Pneumo Ag Urine or CSF - Urine, Urine, Clean Catch [[id number]]  (Normal) Collected: 09/27/24 1258    Lab Status: Final result Specimen: Urine, Clean Catch Updated: 09/27/24 1909     Strep Pneumo Ag Negative    Legionella Antigen, Urine - Urine, Urine, Clean Catch [[id number]]  (Normal) Collected: 09/27/24 1258    Lab Status: Final result Specimen: Urine, Clean Catch Updated: 09/27/24 4903      LEGIONELLA ANTIGEN, URINE Negative    Narrative:      2025-09-26    Respiratory Panel PCR w/COVID-19(SARS-CoV-2) ARNAV/NAIMA/AUNG/PAD/COR/JE In-House, NP Swab in UTM/VTM, 2 HR TAT - Swab, Nasopharynx [[id number]]  (Normal) Collected: 09/27/24 0524    Lab Status: Final result Specimen: Swab from Nasopharynx Updated: 09/27/24 0621     ADENOVIRUS, PCR Not Detected     Coronavirus 229E Not Detected     Coronavirus HKU1 Not Detected     Coronavirus NL63 Not Detected     Coronavirus OC43 Not Detected     COVID19 Not Detected     Human Metapneumovirus Not Detected     Human Rhinovirus/Enterovirus Not Detected     Influenza A PCR Not Detected     Influenza B PCR Not Detected     Parainfluenza Virus 1 Not Detected     Parainfluenza Virus 2 Not Detected     Parainfluenza Virus 3 Not Detected     Parainfluenza Virus 4 Not Detected     RSV, PCR Not Detected     Bordetella pertussis pcr Not Detected     Bordetella parapertussis PCR Not Detected     Chlamydophila pneumoniae PCR Not Detected     Mycoplasma pneumo by PCR Not Detected    Narrative:      In the setting of a positive respiratory panel with a viral infection PLUS a negative procalcitonin without other underlying concern for bacterial infection, consider observing off antibiotics or discontinuation of antibiotics and continue supportive care. If the respiratory panel is positive for atypical bacterial infection (Bordetella pertussis, Chlamydophila pneumoniae, or Mycoplasma pneumoniae), consider antibiotic de-escalation to target atypical bacterial infection.            CT Angiogram Chest Pulmonary Embolism    Result Date: 9/28/2024  CT ANGIOGRAM CHEST PULMONARY EMBOLISM Date of Exam: 9/28/2024 12:02 PM EDT Indication: Pulmonary embolism (PE) suspected, high prob COMPLEX LE PL EFFUSION, R/O PE. Comparison: None available. Technique: Axial CT images were obtained of the chest after the uneventful intravenous administration of intravenous contrast. Utilizing pulmonary embolism  protocol.  Reconstructed coronal and sagittal images were also obtained. Automated exposure control and iterative construction methods were used. Findings: The trachea is patent. There is a large consolidative mass in the left upper lobe measuring approximately 10 x 11 cm extending between the visceral and parietal pleura encasing the left hilar structures. There is a soft tissue mass at the left main bronchus lobar bifurcation. There is a small left pleural effusion. There is a 5 mm pulmonary nodule at the anterior right lung base (image 78). Minimal right posterior basilar atelectasis. 3 mm subpleural nodule right lower lobe (image 72). 5 mm nodule right lower lobe (image 69). The right lung is otherwise clear. Pulmonary arteries: Adequate opacification of the pulmonary arteries. No evidence of acute pulmonary embolism. There is encasement of the lingular segmental and subsegmental branches Borderline cardiomegaly. Thoracic aorta appears within normal limits in size with atherosclerosis. Mediastinal structures not clearly evaluated for lymphadenopathy due to protocol technique. There is mild stranding with edematous appearance of the left axillary region     Impression: Impression: 1. Large left upper lobe consolidative mass compatible with neoplasm 2. Small left pleural effusion 3. No evidence of pulmonary embolism Electronically Signed: Panfilo Simon MD  9/28/2024 12:50 PM EDT  Workstation ID: [id number]    XR Chest 1 View    Result Date: 9/27/2024  XR CHEST 1 VW Date of Exam: 9/27/2024 5:00 AM EDT Indication: SOA triage protocol Comparison: None available. Findings: The heart is enlarged. The pulmonary vascular markings are normal. There is dense consolidation involving the lingular segment of the left upper lobe and portions of the left lower lobe. The osseous structures are normal.     Impression: Impression: Dense consolidation of the lingular segment of the left upper lobe and portions of the left lower  lobe. Electronically Signed: Truman Tejada MD  9/27/2024 5:12 AM EDT  Workstation ID: [id number]         Current medications:  Scheduled Meds:[Held by provider] amLODIPine, 10 mg, Oral, Daily  aspirin, 81 mg, Oral, Daily  cefTRIAXone, 1,000 mg, Intravenous, Q24H  doxycycline, 100 mg, Oral, Q12H  enoxaparin, 40 mg, Subcutaneous, Daily  furosemide, 20 mg, Intravenous, BID  guaiFENesin, 1,200 mg, Oral, Q12H  insulin glargine, 10 Units, Subcutaneous, Daily  insulin lispro, 2-9 Units, Subcutaneous, 4x Daily AC & at Bedtime  ipratropium-albuterol, 3 mL, Nebulization, 4x Daily - RT  [Held by provider] losartan, 25 mg, Oral, Daily  methylPREDNISolone sodium succinate, 40 mg, Intravenous, Q12H  [Held by provider] metoprolol tartrate, 50 mg, Oral, BID  midodrine, 5 mg, Oral, TID AC  pantoprazole, 40 mg, Oral, Q AM  sodium chloride, 10 mL, Intravenous, Q12H      Continuous Infusions:     PRN Meds:.  acetaminophen **OR** acetaminophen **OR** acetaminophen    senna-docusate sodium **AND** polyethylene glycol **AND** bisacodyl **AND** bisacodyl    Calcium Replacement - Follow Nurse / BPA Driven Protocol    cyclobenzaprine    dextrose    dextrose    glucagon (human recombinant)    Magnesium Standard Dose Replacement - Follow Nurse / BPA Driven Protocol    ondansetron ODT **OR** ondansetron    Phosphorus Replacement - Follow Nurse / BPA Driven Protocol    Potassium Replacement - Follow Nurse / BPA Driven Protocol    sodium chloride    sodium chloride    sodium chloride    Assessment & Plan   Assessment & Plan     Active Hospital Problems    Diagnosis  POA    **Pneumonia [J18.9]  Yes    Acute respiratory failure with hypoxia [J96.01]  Yes    Sepsis [A41.9]  Yes    Elevated troponin [R79.89]  Yes    Lactic acid increased [E87.20]  Yes    Uncontrolled type 2 diabetes mellitus with hyperglycemia [E11.65]  Yes    Primary hypertension [I10]  Yes    Dyslipidemia, goal LDL below 70 [E78.5]  Yes      Resolved Hospital Problems   No resolved  "problems to display.        Brief Hospital Course to date:  Pati SIMON is a 64 y.o. female presenting to ED with SOA. Says she started to feel ill about 1 week ago at which time \"felt like she had a cold going on.\" Over the week continued to feel weaker. Developed SOA which hit suddenly late last night prompting her to come to ED. Feels better now after receiving tx in ED. Denies cough, high fevers.    Acute decompensated heart failure, unspecified  Elevated troponin, likely demand ischemia  Patient with symptoms suggestive of congestive heart failure.  She reported that she has been having worsening shortness of breath and lower extremity swelling x 2 weeks  Bedside point-of-care ultrasound concerning for severely reduced left ventricular function.  Official echo ordered  Discontinue IV fluids.    Gentle diuresis with IV Lasix 20 mg twice daily.  Adding midodrine to support her blood pressure  If official echo confirmed systolic heart failure, it might be challenging to start the patient on goal-directed therapy because of her borderline blood pressure with systolic in the 80s to 90s.  At that point, will consult cardiology service to evaluate the patient for ischemic workup (patient did not have any previous history of cardiac ischemia or ischemic workup)    Newly diagnosed left lung neoplasm  CTA chest showed left upper lung neoplasm concerning for malignancy  Will discuss with pulmonary team on Monday to see if she will be a candidate for bronc and biopsy versus CT-guided biopsy.  Hopefully by then, we will sort out her heart failure issues    Possible community-acquired pneumonia   Leukocytosis   Even though the patient does not have any convincing signs of pneumonia, she does have leukocytosis and some infiltrates in the left upper lung lobe  Continue IV Rocephin and doxycycline and follow cultures  Continue Haroldo      Uncontrolled DM w/ hyperglycemia  A1c 7.9%  Continues on glargine and SSI   "   Essential hypertension  Dyslipidemia  Blood pressure is borderline.  Holding home blood pressure medications: Amlodipine, metoprolol and losartan          Expected Discharge Location and Transportation: TBD  Expected Discharge   Expected Discharge Date: 9/30/2024; Expected Discharge Time:      VTE Prophylaxis:  Pharmacologic VTE prophylaxis orders are present.         AM-PAC 6 Clicks Score (PT): 23 (09/28/24 0800)    CODE STATUS:   Code Status and Medical Interventions: CPR (Attempt to Resuscitate); Full Support   Ordered at: 09/27/24 0713     Code Status (Patient has no pulse and is not breathing):    CPR (Attempt to Resuscitate)     Medical Interventions (Patient has pulse or is breathing):    Full Support       Jose L Davis MD  09/28/24

## 2024-09-28 NOTE — CONSULTS
Inpatient Cardiology Consult  Consult performed by: Richie Wiley IV, MD  Consult ordered by: Richie Wiley IV, MD  Reason for consult: Cardiomyopathy            Cardiology Consult         IDENTIFICATION: 64-year-old female who resides in [facility] Problems    Diagnosis  POA    **Mass of upper lobe of left lung [R91.8]  Yes     Priority: High    HFrEF (heart failure with reduced ejection fraction) [I50.20]  Yes     Priority: Medium     Echo (9/20/2024): LVEF <20%.  Mildly dilated left atrium.  Mild MR.      Elevated troponin [R79.89]  Yes    Lactic acid increased [E87.20]  Yes    Type 2 diabetes mellitus with hyperglycemia, without long-term current use of insulin [E11.65]  Yes    Primary hypertension [I10]  Yes    Hyperlipidemia LDL goal <70 [E78.5]  Yes              The patient is a 64-year-old female who presented to the emergency room on 9/27/2024 with shortness of breath and feeling ill for about 1 week.  Over the last week, she has progressively felt weaker.    In the ER, she was noted to have leukocytosis.  proBNP elevated at 6000.  Lactate elevated at 2.5.  Chest x-ray showed left sided consolidation thought to be pneumonia.  She was admitted to the hospitalist service with pneumonia and suspected sepsis.    Hospitalist performed bedside lung ultrasound which showed abnormality of the left lung felt not related to pneumonia.  A CT chest was performed and showed large consolidated lung mass measuring 10 x 11 cm and encasing the left hilar structures.    An echocardiogram performed today showed LVEF <20%.  Patient denies orthopnea, PND.  No history of exertional chest discomfort.    Allergies   Allergen Reactions    Codeine Irritability     jittering    Lipitor [Atorvastatin] Myalgia    Rosuvastatin Nausea Only       Prior to Admission medications    Medication Sig Start Date End Date Taking? Authorizing Provider   albuterol sulfate  (90 Base) MCG/ACT inhaler  Inhale 2 puffs Every 4 (Four) Hours As Needed for Wheezing. 23  Yes Anuradha Toledo APRN   amLODIPine (NORVASC) 10 MG tablet Take 1 tablet by mouth Daily. 23   Anuradha Toledo APRN   aspirin 81 MG EC tablet Take 1 tablet by mouth Daily.    Provider, MD Tamiko   Calcium Citrate-Vitamin D3 (CITRACAL) 315-6.25 MG-MCG tablet tablet Take  by mouth Daily.    Provider, MD Tamiko   cyclobenzaprine (FLEXERIL) 5 MG tablet Take 1 tablet by mouth 3 (Three) Times a Day As Needed for Muscle Spasms. 23   Anuradha Toledo APRN   ezetimibe (Zetia) 10 MG tablet Take 1 tablet by mouth Daily. 23   Anuradha Toledo APRN   glipizide (GLUCOTROL XL) 10 MG 24 hr tablet TAKE 1 TABLET BY MOUTH DAILY 4/3/24   Anuradha Toledo APRN   hydroCHLOROthiazide 25 MG tablet TAKE 1 TABLET BY MOUTH DAILY 4/3/24   Anuradha Toledo APRN   losartan (COZAAR) 25 MG tablet TAKE 1 TABLET BY MOUTH DAILY 4/3/24   Anuradha Toledo APRN   metFORMIN (GLUCOPHAGE) 1000 MG tablet Take 1 tablet by mouth 2 (Two) Times a Day With Meals. 23   Anuradha Toledo APRN   metoprolol tartrate (LOPRESSOR) 50 MG tablet Take 1 tablet by mouth 2 (Two) Times a Day. 23   Anuradha Toledo APRN   nicotine polacrilex (Nicorette) 4 MG gum Chew 1 each Every 2 (Two) Hours As Needed for Smoking Cessation. 23   Anuradha Toledo APRN   omeprazole (priLOSEC) 20 MG capsule Take 1 capsule by mouth Daily. 23   Anuradha Toledo APRN   SITagliptin (Januvia) 50 MG tablet Take 1 tablet by mouth Daily. 23   Anuradha Toledo APRN       Past Medical History:   Diagnosis Date    Diabetes mellitus     GERD (gastroesophageal reflux disease)     Hypertension     Pneumonia     Urinary tract infection     Visual impairment        Past Surgical History:   Procedure Laterality Date     SECTION      4       Family History   Problem Relation Age of Onset    Diabetes Mother     Heart attack Mother 56    Diabetes  Father     Heart attack Father     Diabetes Sister     Suicidality Maternal Grandmother     Heart attack Maternal Grandfather     Stroke Paternal Grandmother     Diabetes Paternal Grandmother     Alcohol abuse Paternal Grandfather     Heart disease Paternal Grandfather        Social History     Tobacco Use   Smoking Status Every Day    Current packs/day: 1.00    Average packs/day: 1 pack/day for 42.7 years (42.7 ttl pk-yrs)    Types: Cigarettes    Start date: 1/1/1982    Passive exposure: Never   Smokeless Tobacco Never       Social History     Substance and Sexual Activity   Alcohol Use Not Currently    Alcohol/week: 1.0 standard drink of alcohol    Types: 1 Glasses of wine per week    Comment: rare         Review of Systems:   Review of Systems   Cardiovascular:  Positive for dyspnea on exertion.   Respiratory:  Positive for cough, shortness of breath and sputum production.             Vital Sign Min/Max for last 24 hours  Temp  Min: 97.4 °F (36.3 °C)  Max: 97.8 °F (36.6 °C)   BP  Min: 93/69  Max: 110/60   Pulse  Min: 72  Max: 96   Resp  Min: 16  Max: 24   SpO2  Min: 89 %  Max: 98 %   Flow (L/min)  Min: 2  Max: 3      Intake/Output Summary (Last 24 hours) at 9/29/2024 1152  Last data filed at 9/29/2024 0837  Gross per 24 hour   Intake 100 ml   Output --   Net 100 ml           Tele: Sinus    Constitutional:       Appearance: Healthy appearance.   Eyes:      General: No scleral icterus.  Neck:      Thyroid: No thyroid mass.      Vascular: No carotid bruit or JVD. JVD normal.   Pulmonary:      Effort: Pulmonary effort is normal.      Breath sounds: Normal breath sounds. Examination of the left-upper field reveals decreased breath sounds.   Cardiovascular:      Normal rate. Regular rhythm.      Murmurs: There is no murmur.      No gallop.    Edema:     Peripheral edema absent.   Skin:     General: Skin is warm. There is no cyanosis.   Neurological:      General: No focal deficit present.      Mental Status: Alert.    Psychiatric:         Attention and Perception: Attention normal.              DATA REVIEW:    EKG (9/27/2024): Sinus tachycardia at 128 bpm.  Nonspecific ST-T wave changes    ECHO (9/28/2024): LVEF <20%.  Mildly dilated left ventricle.  No significant valvular disease.    CT chest (9/20/2024): No pulmonary embolism.  Large left upper lobe mass measuring 10 x 11 cm encasing left hilar structures.        Results from last 7 days   Lab Units 09/29/24  0548 09/28/24  0401 09/27/24  0523   SODIUM mmol/L 136 131* 131*   POTASSIUM mmol/L 5.1 4.5 4.1   CHLORIDE mmol/L 97* 94* 89*   BUN mg/dL 20 20 10   CREATININE mg/dL 0.63 0.52* 0.52*   MAGNESIUM mg/dL 1.8  --   --      Results from last 7 days   Lab Units 09/27/24  0523   HSTROP T ng/L 36*     Results from last 7 days   Lab Units 09/29/24  0548 09/28/24  0401 09/27/24  0523   WBC 10*3/mm3 16.77* 16.19* 17.88*   HEMOGLOBIN g/dL 10.4* 10.2* 11.7*   HEMATOCRIT % 34.1 32.9* 37.7   PLATELETS 10*3/mm3 428 428 543*     Lab Results   Component Value Date    HGBA1C 12.10 (H) 09/28/2024     Lab Results   Component Value Date    CHOL 146 09/28/2024    TRIG 39 09/28/2024    HDL 44 09/28/2024    LDL 93 09/28/2024    AST 29 09/29/2024    ALT 32 09/29/2024       Lab Results   Component Value Date    TROPONINT 36 (H) 09/27/2024         Lab 09/28/24  0401   PROBNP 8,460.0*                Left lung mass concerning for invasive cancer    HFrEF  Presently appears euvolemic  Needs ischemic evaluation  Will attempt to control heart rate and perform CT coronary angiogram tomorrow  Start digoxin for better control heart rate  Metoprolol 25 mg 3 times daily    Type 2 diabetes not on insulin with hyperglycemia  Poorly controlled diabetes with A1c >12  Started on empagliflozin for HFrEF    Hypotension  Presently on midodrine  Stop furosemide    Hyperlipidemia with goal LDL <70  Start atorvastatin 40 mg daily         Stop furosemide  Metoprolol tartrate at 25 mg every 8 hours.  Hold for SBP <95  mmHg or heart rate <50 bpm  Digoxin 500 mcg IV followed by 250 mcg every 6 hours x 2 doses  If heart rate controlled and blood pressure sufficient, will attempt CT coronary angiogram tomorrow.  Otherwise, may need invasive cardiac catheterization      Electronically signed by Richie Wiley IV, MD, 09/29/24, 11:48 AM EDT.

## 2024-09-29 PROBLEM — J96.01 ACUTE RESPIRATORY FAILURE WITH HYPOXIA: Status: RESOLVED | Noted: 2024-09-27 | Resolved: 2024-09-29

## 2024-09-29 LAB
ALBUMIN SERPL-MCNC: 3.2 G/DL (ref 3.5–5.2)
ALBUMIN/GLOB SERPL: 1 G/DL
ALP SERPL-CCNC: 158 U/L (ref 39–117)
ALT SERPL W P-5'-P-CCNC: 32 U/L (ref 1–33)
ANION GAP SERPL CALCULATED.3IONS-SCNC: 7 MMOL/L (ref 5–15)
AST SERPL-CCNC: 29 U/L (ref 1–32)
BASOPHILS # BLD AUTO: 0.02 10*3/MM3 (ref 0–0.2)
BASOPHILS NFR BLD AUTO: 0.1 % (ref 0–1.5)
BILIRUB SERPL-MCNC: <0.2 MG/DL (ref 0–1.2)
BUN SERPL-MCNC: 20 MG/DL (ref 8–23)
BUN/CREAT SERPL: 31.7 (ref 7–25)
CALCIUM SPEC-SCNC: 9.4 MG/DL (ref 8.6–10.5)
CHLORIDE SERPL-SCNC: 97 MMOL/L (ref 98–107)
CO2 SERPL-SCNC: 32 MMOL/L (ref 22–29)
CREAT SERPL-MCNC: 0.63 MG/DL (ref 0.57–1)
DEPRECATED RDW RBC AUTO: 46.7 FL (ref 37–54)
EGFRCR SERPLBLD CKD-EPI 2021: 99.8 ML/MIN/1.73
EOSINOPHIL # BLD AUTO: 0.02 10*3/MM3 (ref 0–0.4)
EOSINOPHIL NFR BLD AUTO: 0.1 % (ref 0.3–6.2)
ERYTHROCYTE [DISTWIDTH] IN BLOOD BY AUTOMATED COUNT: 15.9 % (ref 12.3–15.4)
GLOBULIN UR ELPH-MCNC: 3.1 GM/DL
GLUCOSE BLDC GLUCOMTR-MCNC: 175 MG/DL (ref 70–130)
GLUCOSE BLDC GLUCOMTR-MCNC: 184 MG/DL (ref 70–130)
GLUCOSE BLDC GLUCOMTR-MCNC: 195 MG/DL (ref 70–130)
GLUCOSE BLDC GLUCOMTR-MCNC: 252 MG/DL (ref 70–130)
GLUCOSE SERPL-MCNC: 144 MG/DL (ref 65–99)
HCT VFR BLD AUTO: 34.1 % (ref 34–46.6)
HGB BLD-MCNC: 10.4 G/DL (ref 12–15.9)
IMM GRANULOCYTES # BLD AUTO: 0.06 10*3/MM3 (ref 0–0.05)
IMM GRANULOCYTES NFR BLD AUTO: 0.4 % (ref 0–0.5)
LYMPHOCYTES # BLD AUTO: 1.96 10*3/MM3 (ref 0.7–3.1)
LYMPHOCYTES NFR BLD AUTO: 11.7 % (ref 19.6–45.3)
MAGNESIUM SERPL-MCNC: 1.8 MG/DL (ref 1.6–2.4)
MCH RBC QN AUTO: 24.8 PG (ref 26.6–33)
MCHC RBC AUTO-ENTMCNC: 30.5 G/DL (ref 31.5–35.7)
MCV RBC AUTO: 81.2 FL (ref 79–97)
MONOCYTES # BLD AUTO: 1.92 10*3/MM3 (ref 0.1–0.9)
MONOCYTES NFR BLD AUTO: 11.4 % (ref 5–12)
NEUTROPHILS NFR BLD AUTO: 12.79 10*3/MM3 (ref 1.7–7)
NEUTROPHILS NFR BLD AUTO: 76.3 % (ref 42.7–76)
NRBC BLD AUTO-RTO: 0 /100 WBC (ref 0–0.2)
PHOSPHATE SERPL-MCNC: 4.8 MG/DL (ref 2.5–4.5)
PLATELET # BLD AUTO: 428 10*3/MM3 (ref 140–450)
PMV BLD AUTO: 9.5 FL (ref 6–12)
POTASSIUM SERPL-SCNC: 5.1 MMOL/L (ref 3.5–5.2)
PROT SERPL-MCNC: 6.3 G/DL (ref 6–8.5)
QT INTERVAL: 322 MS
QTC INTERVAL: 470 MS
RBC # BLD AUTO: 4.2 10*6/MM3 (ref 3.77–5.28)
SODIUM SERPL-SCNC: 136 MMOL/L (ref 136–145)
WBC NRBC COR # BLD AUTO: 16.77 10*3/MM3 (ref 3.4–10.8)

## 2024-09-29 PROCEDURE — 97162 PT EVAL MOD COMPLEX 30 MIN: CPT

## 2024-09-29 PROCEDURE — 25010000002 DIGOXIN PER 500 MCG: Performed by: INTERNAL MEDICINE

## 2024-09-29 PROCEDURE — 63710000001 INSULIN GLARGINE PER 5 UNITS: Performed by: INTERNAL MEDICINE

## 2024-09-29 PROCEDURE — 85025 COMPLETE CBC W/AUTO DIFF WBC: CPT | Performed by: INTERNAL MEDICINE

## 2024-09-29 PROCEDURE — 94664 DEMO&/EVAL PT USE INHALER: CPT

## 2024-09-29 PROCEDURE — 63710000001 INSULIN LISPRO (HUMAN) PER 5 UNITS: Performed by: INTERNAL MEDICINE

## 2024-09-29 PROCEDURE — 84100 ASSAY OF PHOSPHORUS: CPT | Performed by: INTERNAL MEDICINE

## 2024-09-29 PROCEDURE — 99232 SBSQ HOSP IP/OBS MODERATE 35: CPT | Performed by: INTERNAL MEDICINE

## 2024-09-29 PROCEDURE — 25010000002 ENOXAPARIN PER 10 MG: Performed by: INTERNAL MEDICINE

## 2024-09-29 PROCEDURE — 25010000002 CEFTRIAXONE PER 250 MG: Performed by: INTERNAL MEDICINE

## 2024-09-29 PROCEDURE — 25010000002 FUROSEMIDE PER 20 MG: Performed by: INTERNAL MEDICINE

## 2024-09-29 PROCEDURE — 94799 UNLISTED PULMONARY SVC/PX: CPT

## 2024-09-29 PROCEDURE — 99222 1ST HOSP IP/OBS MODERATE 55: CPT | Performed by: INTERNAL MEDICINE

## 2024-09-29 PROCEDURE — 80053 COMPREHEN METABOLIC PANEL: CPT | Performed by: INTERNAL MEDICINE

## 2024-09-29 PROCEDURE — 82948 REAGENT STRIP/BLOOD GLUCOSE: CPT

## 2024-09-29 PROCEDURE — 83735 ASSAY OF MAGNESIUM: CPT | Performed by: INTERNAL MEDICINE

## 2024-09-29 RX ORDER — DIGOXIN 0.25 MG/ML
250 INJECTION INTRAMUSCULAR; INTRAVENOUS EVERY 6 HOURS
Status: COMPLETED | OUTPATIENT
Start: 2024-09-29 | End: 2024-09-30

## 2024-09-29 RX ORDER — ATORVASTATIN CALCIUM 40 MG/1
40 TABLET, FILM COATED ORAL NIGHTLY
Status: DISCONTINUED | OUTPATIENT
Start: 2024-09-29 | End: 2024-10-01 | Stop reason: ALTCHOICE

## 2024-09-29 RX ORDER — DIGOXIN 0.25 MG/ML
500 INJECTION INTRAMUSCULAR; INTRAVENOUS ONCE
Status: COMPLETED | OUTPATIENT
Start: 2024-09-29 | End: 2024-09-29

## 2024-09-29 RX ORDER — METOPROLOL TARTRATE 25 MG/1
25 TABLET, FILM COATED ORAL EVERY 8 HOURS
Status: DISCONTINUED | OUTPATIENT
Start: 2024-09-29 | End: 2024-10-02

## 2024-09-29 RX ADMIN — ASPIRIN 81 MG: 81 TABLET, COATED ORAL at 08:37

## 2024-09-29 RX ADMIN — ATORVASTATIN CALCIUM 40 MG: 40 TABLET, FILM COATED ORAL at 22:36

## 2024-09-29 RX ADMIN — INSULIN GLARGINE 10 UNITS: 100 INJECTION, SOLUTION SUBCUTANEOUS at 08:38

## 2024-09-29 RX ADMIN — INSULIN GLARGINE 10 UNITS: 100 INJECTION, SOLUTION SUBCUTANEOUS at 22:37

## 2024-09-29 RX ADMIN — INSULIN LISPRO 2 UNITS: 100 INJECTION, SOLUTION INTRAVENOUS; SUBCUTANEOUS at 22:37

## 2024-09-29 RX ADMIN — ENOXAPARIN SODIUM 40 MG: 100 INJECTION SUBCUTANEOUS at 08:37

## 2024-09-29 RX ADMIN — INSULIN LISPRO 6 UNITS: 100 INJECTION, SOLUTION INTRAVENOUS; SUBCUTANEOUS at 14:18

## 2024-09-29 RX ADMIN — GUAIFENESIN 1200 MG: 600 TABLET, EXTENDED RELEASE ORAL at 08:37

## 2024-09-29 RX ADMIN — EMPAGLIFLOZIN 10 MG: 10 TABLET, FILM COATED ORAL at 08:37

## 2024-09-29 RX ADMIN — DIGOXIN 250 MCG: 0.25 INJECTION INTRAMUSCULAR; INTRAVENOUS at 18:22

## 2024-09-29 RX ADMIN — MIDODRINE HYDROCHLORIDE 5 MG: 5 TABLET ORAL at 11:24

## 2024-09-29 RX ADMIN — DIGOXIN 500 MCG: 250 INJECTION, SOLUTION INTRAMUSCULAR; INTRAVENOUS; PARENTERAL at 14:17

## 2024-09-29 RX ADMIN — INSULIN LISPRO 2 UNITS: 100 INJECTION, SOLUTION INTRAVENOUS; SUBCUTANEOUS at 08:38

## 2024-09-29 RX ADMIN — IPRATROPIUM BROMIDE AND ALBUTEROL SULFATE 3 ML: 2.5; .5 SOLUTION RESPIRATORY (INHALATION) at 07:46

## 2024-09-29 RX ADMIN — Medication 10 ML: at 08:40

## 2024-09-29 RX ADMIN — Medication 12.5 MG: at 11:24

## 2024-09-29 RX ADMIN — DOXYCYCLINE 100 MG: 100 CAPSULE ORAL at 08:37

## 2024-09-29 RX ADMIN — IPRATROPIUM BROMIDE AND ALBUTEROL SULFATE 3 ML: 2.5; .5 SOLUTION RESPIRATORY (INHALATION) at 17:06

## 2024-09-29 RX ADMIN — MIDODRINE HYDROCHLORIDE 5 MG: 5 TABLET ORAL at 08:37

## 2024-09-29 RX ADMIN — FUROSEMIDE 20 MG: 10 INJECTION, SOLUTION INTRAMUSCULAR; INTRAVENOUS at 08:37

## 2024-09-29 RX ADMIN — METOPROLOL TARTRATE 25 MG: 25 TABLET, FILM COATED ORAL at 17:08

## 2024-09-29 RX ADMIN — PANTOPRAZOLE SODIUM 40 MG: 40 TABLET, DELAYED RELEASE ORAL at 05:17

## 2024-09-29 RX ADMIN — INSULIN LISPRO 2 UNITS: 100 INJECTION, SOLUTION INTRAVENOUS; SUBCUTANEOUS at 17:08

## 2024-09-29 RX ADMIN — SODIUM CHLORIDE 1000 MG: 900 INJECTION INTRAVENOUS at 08:37

## 2024-09-29 RX ADMIN — IPRATROPIUM BROMIDE AND ALBUTEROL SULFATE 3 ML: 2.5; .5 SOLUTION RESPIRATORY (INHALATION) at 11:16

## 2024-09-29 RX ADMIN — DOXYCYCLINE 100 MG: 100 CAPSULE ORAL at 22:36

## 2024-09-29 RX ADMIN — MIDODRINE HYDROCHLORIDE 5 MG: 5 TABLET ORAL at 17:08

## 2024-09-29 RX ADMIN — Medication 10 ML: at 22:40

## 2024-09-29 RX ADMIN — GUAIFENESIN 1200 MG: 600 TABLET, EXTENDED RELEASE ORAL at 22:36

## 2024-09-29 RX ADMIN — Medication 12.5 MG: at 02:29

## 2024-09-29 NOTE — PROGRESS NOTES
[facility] Medicine Services  PROGRESS NOTE    Patient Name: Pati SIMON  : 1960  MRN: [id number]    Date of Admission: 2024  Primary Care Physician: Anuradha Toledo APRN    Subjective   Subjective     CC:  For shortness of breath    HPI:  Patient was seen and examined this morning.  Shortness of breath is significantly improved.  Still wheezing.  Still trying to absorb the shock of the new diagnosis of heart failure and lung tumor.  She reported that she lost follow-up with her family doctor years ago because she lost her insurance and thus why she can get any screening CT scan chest.  Currently works as a  at [employer].  Staying with a someone and renting a room from him.  No family in town and nearest 1 is in [place].  Does not want me to talk to any of her family members    Objective   Objective     Vital Signs:   Temp:  [97.4 °F (36.3 °C)-97.8 °F (36.6 °C)] 97.4 °F (36.3 °C)  Heart Rate:  [72-96] 86  Resp:  [16-22] 22  BP: ()/(60-75) 93/69  Flow (L/min):  [2-3] 2     Physical Exam:  General: Comfortable, not in distress, conversant and cooperative  Head: Atraumatic and normocephalic  Eyes: No Icterus. No pallor  Ears:  Ears appear intact with no abnormalities noted  Throat: No oral lesions, no thrush  Neck: Supple, trachea midline  Lungs: Markedly diminished air entry left lung base.  Bilateral wheezing  Heart:  Normal S1 and S2, no murmur, no gallop, No JVD, 3+ lower extremity swelling  Abdomen:  Soft, no tenderness, no organomegaly, normal bowel sounds, no organomegaly  Extremities: pulses equal bilaterally  Skin: No bleeding, bruising or rash, normal skin turgor and elasticity  Neurologic: Cranial nerves appear intact with no evidence of facial asymmetry, normal motor and sensory functions in all 4 extremities  Psych: Alert and oriented x 3, normal mood    Results Reviewed:  LAB RESULTS:      Lab 24  0548 24  7182  09/27/24  0900 09/27/24  0523   WBC 16.77* 16.19*  --  17.88*   HEMOGLOBIN 10.4* 10.2*  --  11.7*   HEMATOCRIT 34.1 32.9*  --  37.7   PLATELETS 428 428  --  543*   NEUTROS ABS 12.79*  --   --  15.22*   IMMATURE GRANS (ABS) 0.06*  --   --  0.08*   LYMPHS ABS 1.96  --   --  1.02   MONOS ABS 1.92*  --   --  1.50*   EOS ABS 0.02  --   --  0.02   MCV 81.2 81.0  --  80.0   CRP  --  4.30*  --   --    PROCALCITONIN  --  0.19  --   --    LACTATE  --   --  1.2 2.5*   HSTROP T  --   --   --  36*         Lab 09/29/24  0548 09/28/24  0401 09/27/24  0523   SODIUM 136 131* 131*   POTASSIUM 5.1 4.5 4.1   CHLORIDE 97* 94* 89*   CO2 32.0* 30.0* 29.0   ANION GAP 7.0 7.0 13.0   BUN 20 20 10   CREATININE 0.63 0.52* 0.52*   EGFR 99.8 104.5 104.5   GLUCOSE 144* 143* 592*   CALCIUM 9.4 9.2 9.1   MAGNESIUM 1.8  --   --    PHOSPHORUS 4.8*  --   --    HEMOGLOBIN A1C  --  12.10*  --          Lab 09/29/24  0548 09/27/24  0523   TOTAL PROTEIN 6.3 7.0   ALBUMIN 3.2* 3.6   GLOBULIN 3.1 3.4   ALT (SGPT) 32 16   AST (SGOT) 29 38*   BILIRUBIN <0.2 0.4   ALK PHOS 158* 163*         Lab 09/28/24  0401 09/27/24  0523   PROBNP 8,460.0* 6,196.0*   HSTROP T  --  36*         Lab 09/28/24  0401   CHOLESTEROL 146   LDL CHOL 93   HDL CHOL 44   TRIGLYCERIDES 39             Lab 09/27/24  0532   PH, ARTERIAL 7.331*   PCO2, ARTERIAL 58.8*   PO2 ART 64.6*   FIO2 36   HCO3 ART 31.0*   BASE EXCESS ART 3.8*   CARBOXYHEMOGLOBIN 5.2*     Brief Urine Lab Results       None            Microbiology Results Abnormal       Procedure Component Value - Date/Time    Blood Culture - Blood, Arm, Right [[id number]]  (Normal) Collected: 09/27/24 0601    Lab Status: Preliminary result Specimen: Blood from Arm, Right Updated: 09/29/24 0645     Blood Culture No growth at 2 days    Narrative:      Less than seven (7) mL's of blood was collected.  Insufficient quantity may yield false negative results.    Blood Culture - Blood, Arm, Left [[id number]]  (Normal) Collected: 09/27/24 7742     Lab Status: Preliminary result Specimen: Blood from Arm, Left Updated: 09/29/24 0645     Blood Culture No growth at 2 days    Narrative:      Less than seven (7) mL's of blood was collected.  Insufficient quantity may yield false negative results.    MRSA Screen, PCR (Inpatient) - Swab, Nares [[id number]]  (Normal) Collected: 09/28/24 1157    Lab Status: Final result Specimen: Swab from Nares Updated: 09/28/24 1413     MRSA PCR Negative    Narrative:      The negative predictive value of this diagnostic test is high and should only be used to consider de-escalating anti-MRSA therapy. A positive result may indicate colonization with MRSA and must be correlated clinically.  MRSA Negative    S. Pneumo Ag Urine or CSF - Urine, Urine, Clean Catch [[id number]]  (Normal) Collected: 09/27/24 1258    Lab Status: Final result Specimen: Urine, Clean Catch Updated: 09/27/24 1909     Strep Pneumo Ag Negative    Legionella Antigen, Urine - Urine, Urine, Clean Catch [[id number]]  (Normal) Collected: 09/27/24 1258    Lab Status: Final result Specimen: Urine, Clean Catch Updated: 09/27/24 1908     LEGIONELLA ANTIGEN, URINE Negative    Narrative:      2025-09-26    Respiratory Panel PCR w/COVID-19(SARS-CoV-2) ARNAV/NAIMA/AUNG/PAD/COR/JE In-House, NP Swab in UTM/VTM, 2 HR TAT - Swab, Nasopharynx [[id number]]  (Normal) Collected: 09/27/24 0524    Lab Status: Final result Specimen: Swab from Nasopharynx Updated: 09/27/24 0621     ADENOVIRUS, PCR Not Detected     Coronavirus 229E Not Detected     Coronavirus HKU1 Not Detected     Coronavirus NL63 Not Detected     Coronavirus OC43 Not Detected     COVID19 Not Detected     Human Metapneumovirus Not Detected     Human Rhinovirus/Enterovirus Not Detected     Influenza A PCR Not Detected     Influenza B PCR Not Detected     Parainfluenza Virus 1 Not Detected     Parainfluenza Virus 2 Not Detected     Parainfluenza Virus 3 Not Detected     Parainfluenza Virus 4 Not Detected     RSV, PCR Not Detected      Bordetella pertussis pcr Not Detected     Bordetella parapertussis PCR Not Detected     Chlamydophila pneumoniae PCR Not Detected     Mycoplasma pneumo by PCR Not Detected    Narrative:      In the setting of a positive respiratory panel with a viral infection PLUS a negative procalcitonin without other underlying concern for bacterial infection, consider observing off antibiotics or discontinuation of antibiotics and continue supportive care. If the respiratory panel is positive for atypical bacterial infection (Bordetella pertussis, Chlamydophila pneumoniae, or Mycoplasma pneumoniae), consider antibiotic de-escalation to target atypical bacterial infection.            Adult Transthoracic Echo Complete W/ Cont if Necessary Per Protocol    Result Date: 9/28/2024    Left ventricular ejection fraction appears to be less than 20%.   The left ventricular cavity is mild to moderately dilated.   The left atrial cavity is mildly dilated.   Estimated right ventricular systolic pressure from tricuspid regurgitation is mildly elevated (38 mmHg).   There is a left pleural effusion. There is a right pleural effusion.     CT Angiogram Chest Pulmonary Embolism    Result Date: 9/28/2024  CT ANGIOGRAM CHEST PULMONARY EMBOLISM Date of Exam: 9/28/2024 12:02 PM EDT Indication: Pulmonary embolism (PE) suspected, high prob COMPLEX LE PL EFFUSION, R/O PE. Comparison: None available. Technique: Axial CT images were obtained of the chest after the uneventful intravenous administration of intravenous contrast. Utilizing pulmonary embolism protocol.  Reconstructed coronal and sagittal images were also obtained. Automated exposure control and iterative construction methods were used. Findings: The trachea is patent. There is a large consolidative mass in the left upper lobe measuring approximately 10 x 11 cm extending between the visceral and parietal pleura encasing the left hilar structures. There is a soft tissue mass at the left main  bronchus lobar bifurcation. There is a small left pleural effusion. There is a 5 mm pulmonary nodule at the anterior right lung base (image 78). Minimal right posterior basilar atelectasis. 3 mm subpleural nodule right lower lobe (image 72). 5 mm nodule right lower lobe (image 69). The right lung is otherwise clear. Pulmonary arteries: Adequate opacification of the pulmonary arteries. No evidence of acute pulmonary embolism. There is encasement of the lingular segmental and subsegmental branches Borderline cardiomegaly. Thoracic aorta appears within normal limits in size with atherosclerosis. Mediastinal structures not clearly evaluated for lymphadenopathy due to protocol technique. There is mild stranding with edematous appearance of the left axillary region     Impression: Impression: 1. Large left upper lobe consolidative mass compatible with neoplasm 2. Small left pleural effusion 3. No evidence of pulmonary embolism Electronically Signed: Panfilo Simon MD  9/28/2024 12:50 PM EDT  Workstation ID: [id number]     Results for orders placed during the hospital encounter of 09/27/24    Adult Transthoracic Echo Complete W/ Cont if Necessary Per Protocol    Interpretation Summary    Left ventricular ejection fraction appears to be less than 20%.    The left ventricular cavity is mild to moderately dilated.    The left atrial cavity is mildly dilated.    Estimated right ventricular systolic pressure from tricuspid regurgitation is mildly elevated (38 mmHg).    There is a left pleural effusion. There is a right pleural effusion.      Current medications:  Scheduled Meds:aspirin, 81 mg, Oral, Daily  atorvastatin, 40 mg, Oral, Nightly  cefTRIAXone, 1,000 mg, Intravenous, Q24H  digoxin, 500 mcg, Intravenous, Once   Followed by  digoxin, 250 mcg, Intravenous, Q6H  doxycycline, 100 mg, Oral, Q12H  empagliflozin, 10 mg, Oral, Daily  enoxaparin, 40 mg, Subcutaneous, Daily  guaiFENesin, 1,200 mg, Oral, Q12H  insulin glargine, 10  "Units, Subcutaneous, Q12H  insulin lispro, 2-9 Units, Subcutaneous, 4x Daily AC & at Bedtime  ipratropium-albuterol, 3 mL, Nebulization, 4x Daily - RT  [Held by provider] losartan, 25 mg, Oral, Daily  metoprolol tartrate, 25 mg, Oral, Q8H  midodrine, 5 mg, Oral, TID AC  pantoprazole, 40 mg, Oral, Q AM  sodium chloride, 10 mL, Intravenous, Q12H      Continuous Infusions:     PRN Meds:.  acetaminophen **OR** acetaminophen **OR** acetaminophen    senna-docusate sodium **AND** polyethylene glycol **AND** bisacodyl **AND** bisacodyl    Calcium Replacement - Follow Nurse / BPA Driven Protocol    cyclobenzaprine    dextrose    dextrose    glucagon (human recombinant)    Magnesium Standard Dose Replacement - Follow Nurse / BPA Driven Protocol    ondansetron ODT **OR** ondansetron    Phosphorus Replacement - Follow Nurse / BPA Driven Protocol    Potassium Replacement - Follow Nurse / BPA Driven Protocol    sodium chloride    sodium chloride    sodium chloride    Assessment & Plan   Assessment & Plan     Active Hospital Problems    Diagnosis  POA    **Mass of upper lobe of left lung [R91.8]  Yes    HFrEF (heart failure with reduced ejection fraction) [I50.20]  Yes    Elevated troponin [R79.89]  Yes    Lactic acid increased [E87.20]  Yes    Type 2 diabetes mellitus with hyperglycemia, without long-term current use of insulin [E11.65]  Yes    Primary hypertension [I10]  Yes    Hyperlipidemia LDL goal <70 [E78.5]  Yes      Resolved Hospital Problems    Diagnosis Date Resolved POA    Pneumonia [J18.9] 09/28/2024 Yes    Acute respiratory failure with hypoxia [J96.01] 09/29/2024 Yes    Sepsis [A41.9] 09/28/2024 Yes        Brief Hospital Course to date:  Pati SIMON is a 64 y.o. female presenting to ED with SOA. Says she started to feel ill about 1 week ago at which time \"felt like she had a cold going on.\" Over the week continued to feel weaker. Developed SOA which hit suddenly late last night prompting her to come to ED. Feels " better now after receiving tx in ED. Denies cough, high fevers.    Acute decompensated systolic heart failure, improved  Elevated troponin, likely demand ischemia  Patient with symptoms suggestive of congestive heart failure.  She reported that she has been having worsening shortness of breath and lower extremity swelling x 2 weeks  Bedside point-of-care ultrasound concerning for severely reduced left ventricular function.    Formal echo with severe systolic dysfunction, EF less than 20%  Status post IV Lasix diuresis with improvement of her dyspnea  Discussed with Dr. Wiley/cardiology.  Plan for heart rate control today with metoprolol and digoxin and CCTA tomorrow to evaluate for coronary artery disease as a cause of her systolic heart failure  Defer further diuresis to cardiology team    Hypotension  In the view of newly diagnosed systolic heart failure  Continue midodrine  Will be limiting factor to escalate systolic heart failure goal-directed therapy    Newly diagnosed left lung neoplasm  CTA chest showed left upper lung neoplasm concerning for malignancy  Pulmonary consult tomorrow to evaluate the patient for bronc and biopsy versus CT-guided biopsy.    Will need oncology referral upon discharge  Lack of insurance will be a challenge.  Case management consulted    Possible community-acquired pneumonia   Leukocytosis   Even though the patient does not have any convincing signs of pneumonia, she does have leukocytosis and some infiltrates in the left upper lung lobe  Continue IV Rocephin and doxycycline and follow cultures  Continue DuoNebs      Uncontrolled DM w/ hyperglycemia  A1c 7.9%  Continues on glargine and SSI     Essential hypertension  Dyslipidemia  Blood pressure is borderline.  Holding home blood pressure medications: Amlodipine and losartan    Expected Discharge Location and Transportation: TBD  Expected Discharge   Expected Discharge Date: 10/1/2024; Expected Discharge Time:      VTE  Prophylaxis:  Pharmacologic VTE prophylaxis orders are present.         AM-PAC 6 Clicks Score (PT): 23 (09/29/24 0800)    CODE STATUS:   Code Status and Medical Interventions: CPR (Attempt to Resuscitate); Full Support   Ordered at: 09/27/24 0713     Code Status (Patient has no pulse and is not breathing):    CPR (Attempt to Resuscitate)     Medical Interventions (Patient has pulse or is breathing):    Full Support     Copied text in this note has been reviewed and is accurate as of 09/29/24.     Jose L Davis MD  09/29/24

## 2024-09-29 NOTE — PLAN OF CARE
Goal Outcome Evaluation:  Plan of Care Reviewed With: patient        Progress: no change  Outcome Evaluation: PT initial evaluation completed. No further goals established as pt demonstrates safety and appears at baseline re: functional mobility. Able to ambulate 300 ft independently with good safety awareness (noted O2 sat at 89% after gait with 2L NC). Educated pt re: benefit of additional gait throughout day with NSG. Will d/c PT at this time. Recommend d/c home with assist.      Anticipated Discharge Disposition (PT): home with assist

## 2024-09-29 NOTE — PLAN OF CARE
Goal Outcome Evaluation:                   Patient is A&Ox4, on 2L NC, up ad darren and NSR on monitor with PVCs. Patient had no complaints throughout the night. Will continue plan of care.

## 2024-09-29 NOTE — THERAPY DISCHARGE NOTE
Patient Name: Pati SIMON  : 1960    MRN: [id number]                              Today's Date: 2024       Admit Date: 2024    Visit Dx:     ICD-10-CM ICD-9-CM   1. Acute respiratory failure with hypoxia and hypercapnia  J96.01 518.81    J96.02    2. Pneumonia of left upper lobe due to infectious organism  J18.9 486   3. Acute kidney injury  N17.9 584.9   4. Hyperglycemia  R73.9 790.29   5. 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     Past Medical History:   Diagnosis Date    Diabetes mellitus     GERD (gastroesophageal reflux disease)     Hypertension     Pneumonia     Urinary tract infection     Visual impairment      Past Surgical History:   Procedure Laterality Date     SECTION      4      General Information       Row Name 24 1503          Physical Therapy Time and Intention    Document Type discharge evaluation/summary  -LS     Mode of Treatment physical therapy  -LS       Row Name 24 1503          General Information    Patient Profile Reviewed yes  -LS     Prior Level of Function independent:;all household mobility;ADL's  -LS     Existing Precautions/Restrictions oxygen therapy device and L/min  -LS     Barriers to Rehab none identified  -LS       Row Name 24 1503          Living Environment    People in Home other (see comments)  room mate  -LS       Row Name 24 1503          Home Main Entrance    Number of Stairs, Main Entrance three  -LS       Row Name 24 1503          Stairs Within Home, Primary    Number of Stairs, Within Home, Primary none  -LS       Row Name 24 1503          Cognition    Orientation  Status (Cognition) oriented x 4  -LS               User Key  (r) = Recorded By, (t) = Taken By, (c) = Cosigned By      Initials Name Provider Type    Shira Leos, PT Physical Therapist                   Mobility       Row Name 09/29/24 1504          Bed Mobility    Comment, (Bed Mobility) EOB upon arrival  -       Row Name 09/29/24 1504          Sit-Stand Transfer    Sit-Stand [place] (Transfers) independent  -       Row Name 09/29/24 1504          Gait/Stairs (Locomotion)    [place] Level (Gait) independent  -LS     Patient was able to Ambulate yes  -LS     Distance in Feet (Gait) 300  -     Comment, (Gait/Stairs) No LOB. Good safety awareness.  -               User Key  (r) = Recorded By, (t) = Taken By, (c) = Cosigned By      Initials Name Provider Type    Shira Leos, PT Physical Therapist                   Obj/Interventions       Row Name 09/29/24 1505          Range of Motion Comprehensive    General Range of Motion bilateral lower extremity ROM WFL  -       Row Name 09/29/24 1505          Strength Comprehensive (MMT)    General Manual Muscle Testing (MMT) Assessment lower extremity strength deficits identified  -LS     Comment, General Manual Muscle Testing (MMT) Assessment BLE grossly 4/5  -       Row Name 09/29/24 1505          Balance    Balance Assessment sitting static balance;standing static balance  -LS     Static Sitting Balance independent  -LS     Position, Sitting Balance sitting edge of bed  -LS     Static Standing Balance independent  -LS     Position/Device Used, Standing Balance unsupported  -       Row Name 09/29/24 1505          Sensory Assessment (Somatosensory)    Sensory Assessment (Somatosensory) LE sensation intact  -               User Key  (r) = Recorded By, (t) = Taken By, (c) = Cosigned By      Initials Name Provider Type    Shira Leos, PT Physical Therapist                   Goals/Plan    No documentation.                  Clinical  Impression       Row Name 09/29/24 1506          Pain    Pretreatment Pain Rating 0/10 - no pain  -LS     Posttreatment Pain Rating 0/10 - no pain  -LS       Row Name 09/29/24 1506          Plan of Care Review    Plan of Care Reviewed With patient  -LS     Progress no change  -LS     Outcome Evaluation PT initial evaluation completed. No further goals established as pt demonstrates safety and appears at baseline re: functional mobility. Able to ambulate 300 ft independently with good safety awareness (noted O2 sat at 89% after gait with 2L NC). Educated pt re: benefit of additional gait throughout day with NSG. Will d/c PT at this time. Recommend d/c home with assist.  -LS       Row Name 09/29/24 1506          Therapy Assessment/Plan (PT)    Patient/Family Therapy Goals Statement (PT) return to PLOF  -     Criteria for Skilled Interventions Met (PT) no problems identified which require skilled intervention  -LS     Therapy Frequency (PT) evaluation only  -LS     Predicted Duration of Therapy Intervention (PT) 1 day  -LS       Row Name 09/29/24 1506          Vital Signs    Pre Systolic BP Rehab 93  -LS     Pre Treatment Diastolic BP 64  -LS     Pretreatment Heart Rate (beats/min) 63  -LS     Posttreatment Heart Rate (beats/min) 79  -LS     Pre SpO2 (%) 90  -LS     O2 Delivery Pre Treatment nasal cannula  -LS     Intra SpO2 (%) 89  -LS     O2 Delivery Intra Treatment nasal cannula  -LS     Post SpO2 (%) 91  -LS     O2 Delivery Post Treatment nasal cannula  -LS     Pre Patient Position Sitting  -LS     Intra Patient Position Standing  -LS     Post Patient Position Sitting  -LS       Row Name 09/29/24 1506          Positioning and Restraints    Pre-Treatment Position in bed  -LS     Post Treatment Position chair  -LS     In Chair notified nsg;reclined;call light within reach;encouraged to call for assist;waffle cushion;legs elevated;with family/caregiver  -LS               User Key  (r) = Recorded By, (t) = Taken By,  (c) = Cosigned By      Initials Name Provider Type    Shira Leos, PT Physical Therapist                   Outcome Measures       Row Name 09/29/24 1510 09/29/24 0800       How much help from another person do you currently need...    Turning from your back to your side while in flat bed without using bedrails? 4  -LS 4  -DA    Moving from lying on back to sitting on the side of a flat bed without bedrails? 4  -LS 4  -DA    Moving to and from a bed to a chair (including a wheelchair)? 4  -LS 4  -DA    Standing up from a chair using your arms (e.g., wheelchair, bedside chair)? 4  -LS 4  -DA    Climbing 3-5 steps with a railing? 4  -LS 3  -DA    To walk in hospital room? 4  -LS 4  -DA    AM-PAC 6 Clicks Score (PT) 24  -LS 23  -DA    Highest Level of Mobility Goal 8 --> Walked 250 feet or more  -LS 7 --> Walk 25 feet or more  -DA              User Key  (r) = Recorded By, (t) = Taken By, (c) = Cosigned By      Initials Name Provider Type    Shira Leos, PT Physical Therapist    Steve Bryant RN Registered Nurse                  Physical Therapy Education       Title: PT OT SLP Therapies (In Progress)       Topic: Physical Therapy (In Progress)       Point: Mobility training (Done)       Learning Progress Summary             Patient Acceptance, E,D, VU,DU by  at 9/29/2024 1510   Other Acceptance, E,D, VU,DU by  at 9/29/2024 1510                         Point: Home exercise program (Not Started)       Learner Progress:  Not documented in this visit.              Point: Body mechanics (Done)       Learning Progress Summary             Patient Acceptance, E,D, VU,DU by  at 9/29/2024 1510   Other Acceptance, E,D, VU,DU by  at 9/29/2024 1510                         Point: Precautions (Done)       Learning Progress Summary             Patient Acceptance, E,D, VU,DU by  at 9/29/2024 1510   Other Acceptance, E,D, VU,DU by  at 9/29/2024 1510                                         User Key        Initials Effective Dates Name Provider Type Discipline     02/03/23 -  Shira Thorpe, PT Physical Therapist PT                  PT Recommendation and Plan     Plan of Care Reviewed With: patient  Progress: no change  Outcome Evaluation: PT initial evaluation completed. No further goals established as pt demonstrates safety and appears at baseline re: functional mobility. Able to ambulate 300 ft independently with good safety awareness (noted O2 sat at 89% after gait with 2L NC). Educated pt re: benefit of additional gait throughout day with NSG. Will d/c PT at this time. Recommend d/c home with assist.     Time Calculation:   PT Evaluation Complexity  History, PT Evaluation Complexity: 3 or more personal factors and/or comorbidities  Examination of Body Systems (PT Eval Complexity): total of 4 or more elements  Clinical Presentation (PT Evaluation Complexity): evolving  Clinical Decision Making (PT Evaluation Complexity): moderate complexity  Overall Complexity (PT Evaluation Complexity): moderate complexity     PT Charges       Row Name 09/29/24 1512             Time Calculation    Start Time 1409  -LS      PT Received On 09/29/24  -LS         Untimed Charges    PT Eval/Re-eval Minutes 41  -LS         Total Minutes    Untimed Charges Total Minutes 41  -LS       Total Minutes 41  -LS                User Key  (r) = Recorded By, (t) = Taken By, (c) = Cosigned By      Initials Name Provider Type     Shira Thorpe, PT Physical Therapist                  Therapy Charges for Today       Code Description Service Date Service Provider Modifiers Qty    79631630408 HC PT EVAL MOD COMPLEXITY 3 9/29/2024 Shira Thorpe, PT GP 1            PT G-Codes  AM-PAC 6 Clicks Score (PT): 24    PT Discharge Summary  Anticipated Discharge Disposition (PT): home with assist  Reason for Discharge: Independent, At baseline function  Outcomes Achieved: Refer to plan of care for updates on goals achieved    Shira Thorpe  PT  9/29/2024

## 2024-09-30 ENCOUNTER — APPOINTMENT (OUTPATIENT)
Dept: CT IMAGING | Facility: HOSPITAL | Age: 64
DRG: 871 | End: 2024-09-30
Payer: MEDICAID

## 2024-09-30 LAB
ALBUMIN SERPL-MCNC: 3.2 G/DL (ref 3.5–5.2)
ALBUMIN/GLOB SERPL: 1.3 G/DL
ALP SERPL-CCNC: 137 U/L (ref 39–117)
ALT SERPL W P-5'-P-CCNC: 24 U/L (ref 1–33)
ANION GAP SERPL CALCULATED.3IONS-SCNC: 6 MMOL/L (ref 5–15)
AST SERPL-CCNC: 14 U/L (ref 1–32)
BASOPHILS # BLD AUTO: 0.03 10*3/MM3 (ref 0–0.2)
BASOPHILS NFR BLD AUTO: 0.2 % (ref 0–1.5)
BILIRUB SERPL-MCNC: 0.2 MG/DL (ref 0–1.2)
BUN SERPL-MCNC: 21 MG/DL (ref 8–23)
BUN/CREAT SERPL: 38.2 (ref 7–25)
CALCIUM SPEC-SCNC: 8.8 MG/DL (ref 8.6–10.5)
CHLORIDE SERPL-SCNC: 97 MMOL/L (ref 98–107)
CHOLEST SERPL-MCNC: 155 MG/DL (ref 0–200)
CO2 SERPL-SCNC: 35 MMOL/L (ref 22–29)
CREAT SERPL-MCNC: 0.55 MG/DL (ref 0.57–1)
DEPRECATED RDW RBC AUTO: 46.8 FL (ref 37–54)
EGFRCR SERPLBLD CKD-EPI 2021: 103.1 ML/MIN/1.73
EOSINOPHIL # BLD AUTO: 0.05 10*3/MM3 (ref 0–0.4)
EOSINOPHIL NFR BLD AUTO: 0.3 % (ref 0.3–6.2)
ERYTHROCYTE [DISTWIDTH] IN BLOOD BY AUTOMATED COUNT: 15.7 % (ref 12.3–15.4)
GLOBULIN UR ELPH-MCNC: 2.4 GM/DL
GLUCOSE BLDC GLUCOMTR-MCNC: 169 MG/DL (ref 70–130)
GLUCOSE BLDC GLUCOMTR-MCNC: 221 MG/DL (ref 70–130)
GLUCOSE BLDC GLUCOMTR-MCNC: 62 MG/DL (ref 70–130)
GLUCOSE BLDC GLUCOMTR-MCNC: 73 MG/DL (ref 70–130)
GLUCOSE BLDC GLUCOMTR-MCNC: 78 MG/DL (ref 70–130)
GLUCOSE BLDC GLUCOMTR-MCNC: 87 MG/DL (ref 70–130)
GLUCOSE SERPL-MCNC: 61 MG/DL (ref 65–99)
HCT VFR BLD AUTO: 35.4 % (ref 34–46.6)
HGB BLD-MCNC: 10.7 G/DL (ref 12–15.9)
IMM GRANULOCYTES # BLD AUTO: 0.07 10*3/MM3 (ref 0–0.05)
IMM GRANULOCYTES NFR BLD AUTO: 0.5 % (ref 0–0.5)
LYMPHOCYTES # BLD AUTO: 1.73 10*3/MM3 (ref 0.7–3.1)
LYMPHOCYTES NFR BLD AUTO: 12.1 % (ref 19.6–45.3)
MAGNESIUM SERPL-MCNC: 1.7 MG/DL (ref 1.6–2.4)
MCH RBC QN AUTO: 24.8 PG (ref 26.6–33)
MCHC RBC AUTO-ENTMCNC: 30.2 G/DL (ref 31.5–35.7)
MCV RBC AUTO: 81.9 FL (ref 79–97)
MONOCYTES # BLD AUTO: 2.04 10*3/MM3 (ref 0.1–0.9)
MONOCYTES NFR BLD AUTO: 14.2 % (ref 5–12)
NEUTROPHILS NFR BLD AUTO: 10.43 10*3/MM3 (ref 1.7–7)
NEUTROPHILS NFR BLD AUTO: 72.7 % (ref 42.7–76)
NRBC BLD AUTO-RTO: 0 /100 WBC (ref 0–0.2)
PHOSPHATE SERPL-MCNC: 4.1 MG/DL (ref 2.5–4.5)
PLATELET # BLD AUTO: 420 10*3/MM3 (ref 140–450)
PMV BLD AUTO: 9.5 FL (ref 6–12)
POTASSIUM SERPL-SCNC: 4.7 MMOL/L (ref 3.5–5.2)
PROT SERPL-MCNC: 5.6 G/DL (ref 6–8.5)
RBC # BLD AUTO: 4.32 10*6/MM3 (ref 3.77–5.28)
SODIUM SERPL-SCNC: 138 MMOL/L (ref 136–145)
TRIGL SERPL-MCNC: 74 MG/DL (ref 0–150)
WBC NRBC COR # BLD AUTO: 14.35 10*3/MM3 (ref 3.4–10.8)

## 2024-09-30 PROCEDURE — 94799 UNLISTED PULMONARY SVC/PX: CPT

## 2024-09-30 PROCEDURE — 25510000001 IOPAMIDOL PER 1 ML: Performed by: HOSPITALIST

## 2024-09-30 PROCEDURE — 25010000002 ENOXAPARIN PER 10 MG: Performed by: INTERNAL MEDICINE

## 2024-09-30 PROCEDURE — 63710000001 INSULIN LISPRO (HUMAN) PER 5 UNITS: Performed by: INTERNAL MEDICINE

## 2024-09-30 PROCEDURE — 94664 DEMO&/EVAL PT USE INHALER: CPT

## 2024-09-30 PROCEDURE — 25010000002 DIGOXIN PER 500 MCG: Performed by: INTERNAL MEDICINE

## 2024-09-30 PROCEDURE — 75574 CT ANGIO HRT W/3D IMAGE: CPT | Performed by: INTERNAL MEDICINE

## 2024-09-30 PROCEDURE — 99232 SBSQ HOSP IP/OBS MODERATE 35: CPT | Performed by: HOSPITALIST

## 2024-09-30 PROCEDURE — 82948 REAGENT STRIP/BLOOD GLUCOSE: CPT

## 2024-09-30 PROCEDURE — 75574 CT ANGIO HRT W/3D IMAGE: CPT

## 2024-09-30 PROCEDURE — 83735 ASSAY OF MAGNESIUM: CPT | Performed by: INTERNAL MEDICINE

## 2024-09-30 PROCEDURE — 85025 COMPLETE CBC W/AUTO DIFF WBC: CPT | Performed by: INTERNAL MEDICINE

## 2024-09-30 PROCEDURE — 84100 ASSAY OF PHOSPHORUS: CPT | Performed by: INTERNAL MEDICINE

## 2024-09-30 PROCEDURE — 99223 1ST HOSP IP/OBS HIGH 75: CPT | Performed by: INTERNAL MEDICINE

## 2024-09-30 PROCEDURE — 80053 COMPREHEN METABOLIC PANEL: CPT | Performed by: INTERNAL MEDICINE

## 2024-09-30 PROCEDURE — 99232 SBSQ HOSP IP/OBS MODERATE 35: CPT

## 2024-09-30 PROCEDURE — 82465 ASSAY BLD/SERUM CHOLESTEROL: CPT | Performed by: INTERNAL MEDICINE

## 2024-09-30 PROCEDURE — 84478 ASSAY OF TRIGLYCERIDES: CPT | Performed by: INTERNAL MEDICINE

## 2024-09-30 RX ORDER — NITROGLYCERIN 0.4 MG/1
0.8 TABLET SUBLINGUAL ONCE
Status: COMPLETED | OUTPATIENT
Start: 2024-09-30 | End: 2024-09-30

## 2024-09-30 RX ORDER — IVABRADINE 5 MG/1
15 TABLET, FILM COATED ORAL ONCE
OUTPATIENT
Start: 2024-09-30 | End: 2024-09-30

## 2024-09-30 RX ORDER — METOPROLOL TARTRATE 50 MG
50 TABLET ORAL ONCE
OUTPATIENT
Start: 2024-09-30

## 2024-09-30 RX ORDER — METOPROLOL TARTRATE 50 MG
50 TABLET ORAL
OUTPATIENT
Start: 2024-09-30

## 2024-09-30 RX ORDER — NITROGLYCERIN 0.4 MG/1
0.4 TABLET SUBLINGUAL
OUTPATIENT
Start: 2024-09-30 | End: 2024-09-30

## 2024-09-30 RX ORDER — METOPROLOL TARTRATE 1 MG/ML
5 INJECTION, SOLUTION INTRAVENOUS
OUTPATIENT
Start: 2024-09-30

## 2024-09-30 RX ORDER — METOPROLOL TARTRATE 100 MG
100 TABLET ORAL ONCE
OUTPATIENT
Start: 2024-09-30

## 2024-09-30 RX ORDER — IOPAMIDOL 755 MG/ML
65 INJECTION, SOLUTION INTRAVASCULAR
Status: COMPLETED | OUTPATIENT
Start: 2024-09-30 | End: 2024-09-30

## 2024-09-30 RX ORDER — METOPROLOL TARTRATE 100 MG
200 TABLET ORAL ONCE
OUTPATIENT
Start: 2024-09-30 | End: 2024-09-30

## 2024-09-30 RX ORDER — NITROGLYCERIN 0.4 MG/1
0.8 TABLET SUBLINGUAL
OUTPATIENT
Start: 2024-09-30

## 2024-09-30 RX ORDER — METOPROLOL TARTRATE 25 MG/1
25 TABLET, FILM COATED ORAL ONCE
OUTPATIENT
Start: 2024-09-30

## 2024-09-30 RX ADMIN — GUAIFENESIN 1200 MG: 600 TABLET, EXTENDED RELEASE ORAL at 22:54

## 2024-09-30 RX ADMIN — IPRATROPIUM BROMIDE AND ALBUTEROL SULFATE 3 ML: 2.5; .5 SOLUTION RESPIRATORY (INHALATION) at 16:06

## 2024-09-30 RX ADMIN — Medication 10 ML: at 22:58

## 2024-09-30 RX ADMIN — IPRATROPIUM BROMIDE AND ALBUTEROL SULFATE 3 ML: 2.5; .5 SOLUTION RESPIRATORY (INHALATION) at 07:30

## 2024-09-30 RX ADMIN — MIDODRINE HYDROCHLORIDE 5 MG: 5 TABLET ORAL at 19:01

## 2024-09-30 RX ADMIN — Medication 10 ML: at 10:19

## 2024-09-30 RX ADMIN — DOXYCYCLINE 100 MG: 100 CAPSULE ORAL at 10:14

## 2024-09-30 RX ADMIN — METOPROLOL TARTRATE 25 MG: 25 TABLET, FILM COATED ORAL at 02:49

## 2024-09-30 RX ADMIN — DIGOXIN 250 MCG: 0.25 INJECTION INTRAMUSCULAR; INTRAVENOUS at 01:10

## 2024-09-30 RX ADMIN — MIDODRINE HYDROCHLORIDE 5 MG: 5 TABLET ORAL at 12:49

## 2024-09-30 RX ADMIN — IOPAMIDOL 65 ML: 755 INJECTION, SOLUTION INTRAVENOUS at 16:56

## 2024-09-30 RX ADMIN — ASPIRIN 81 MG: 81 TABLET, COATED ORAL at 10:14

## 2024-09-30 RX ADMIN — GUAIFENESIN 1200 MG: 600 TABLET, EXTENDED RELEASE ORAL at 10:15

## 2024-09-30 RX ADMIN — MIDODRINE HYDROCHLORIDE 5 MG: 5 TABLET ORAL at 06:31

## 2024-09-30 RX ADMIN — METOPROLOL TARTRATE 25 MG: 25 TABLET, FILM COATED ORAL at 10:14

## 2024-09-30 RX ADMIN — PANTOPRAZOLE SODIUM 40 MG: 40 TABLET, DELAYED RELEASE ORAL at 06:31

## 2024-09-30 RX ADMIN — NITROGLYCERIN 0.8 MG: 0.4 TABLET SUBLINGUAL at 16:41

## 2024-09-30 RX ADMIN — ENOXAPARIN SODIUM 40 MG: 100 INJECTION SUBCUTANEOUS at 10:15

## 2024-09-30 RX ADMIN — DOXYCYCLINE 100 MG: 100 CAPSULE ORAL at 22:54

## 2024-09-30 RX ADMIN — INSULIN LISPRO 2 UNITS: 100 INJECTION, SOLUTION INTRAVENOUS; SUBCUTANEOUS at 22:56

## 2024-09-30 RX ADMIN — IPRATROPIUM BROMIDE AND ALBUTEROL SULFATE 3 ML: 2.5; .5 SOLUTION RESPIRATORY (INHALATION) at 11:56

## 2024-09-30 RX ADMIN — METOPROLOL TARTRATE 25 MG: 25 TABLET, FILM COATED ORAL at 19:01

## 2024-09-30 NOTE — CONSULTS
Pulmonary Consult Note     Chief Complaint:  Mass of upper lobe of left lung    History of Present Illness     64-year-old female with a past medical history significant for GERD, hypertension, diabetes mellitus.  Who presented to the [facility] ER shortness of breath.  She ended up having a large left upper lobe lung mass.  Pulmonary consulted for possible biopsy.  She is also had to have workup for coronary artery disease with a EF of 20%.    Problem List, Surgical History, Family, Social History, and ROS     Past Medical History:   Diagnosis Date    Diabetes mellitus     GERD (gastroesophageal reflux disease)     Hypertension     Pneumonia     Urinary tract infection     Visual impairment       Past Surgical History:   Procedure Laterality Date     SECTION      4       Allergies   Allergen Reactions    Codeine Irritability     jittering    Lipitor [Atorvastatin] Myalgia    Rosuvastatin Nausea Only     No current facility-administered medications on file prior to encounter.     Current Outpatient Medications on File Prior to Encounter   Medication Sig    albuterol sulfate  (90 Base) MCG/ACT inhaler Inhale 2 puffs Every 4 (Four) Hours As Needed for Wheezing.    amLODIPine (NORVASC) 10 MG tablet Take 1 tablet by mouth Daily.    aspirin 81 MG EC tablet Take 1 tablet by mouth Daily.    Calcium Citrate-Vitamin D3 (CITRACAL) 315-6.25 MG-MCG tablet tablet Take  by mouth Daily.    cyclobenzaprine (FLEXERIL) 5 MG tablet Take 1 tablet by mouth 3 (Three) Times a Day As Needed for Muscle Spasms.    ezetimibe (Zetia) 10 MG tablet Take 1 tablet by mouth Daily.    glipizide (GLUCOTROL XL) 10 MG 24 hr tablet TAKE 1 TABLET BY MOUTH DAILY    hydroCHLOROthiazide 25 MG tablet TAKE 1 TABLET BY MOUTH DAILY    losartan (COZAAR) 25 MG tablet TAKE 1 TABLET BY MOUTH DAILY    metFORMIN (GLUCOPHAGE) 1000 MG tablet Take 1 tablet by mouth 2 (Two) Times a Day With Meals.    metoprolol tartrate (LOPRESSOR) 50 MG  tablet Take 1 tablet by mouth 2 (Two) Times a Day.    nicotine polacrilex (Nicorette) 4 MG gum Chew 1 each Every 2 (Two) Hours As Needed for Smoking Cessation.    omeprazole (priLOSEC) 20 MG capsule Take 1 capsule by mouth Daily.    SITagliptin (Januvia) 50 MG tablet Take 1 tablet by mouth Daily.     MEDICATION LIST AND ALLERGIES REVIEWED.    Family History   Problem Relation Age of Onset    Diabetes Mother     Heart attack Mother 56    Diabetes Father     Heart attack Father     Diabetes Sister     Suicidality Maternal Grandmother     Heart attack Maternal Grandfather     Stroke Paternal Grandmother     Diabetes Paternal Grandmother     Alcohol abuse Paternal Grandfather     Heart disease Paternal Grandfather      Social History     Tobacco Use    Smoking status: Every Day     Current packs/day: 1.00     Average packs/day: 1 pack/day for 42.7 years (42.7 ttl pk-yrs)     Types: Cigarettes     Start date: 1/1/1982     Passive exposure: Never    Smokeless tobacco: Never   Vaping Use    Vaping status: Never Used   Substance Use Topics    Alcohol use: Not Currently     Alcohol/week: 1.0 standard drink of alcohol     Types: 1 Glasses of wine per week     Comment: rare    Drug use: Not Currently     Types: Marijuana     Comment: occ     Social History     Social History Narrative    Not on file     FAMILY AND SOCIAL HISTORY REVIEWED.    Review of Systems   Constitutional:  Negative for activity change, appetite change, chills and fever.   HENT:  Negative for congestion, sore throat and voice change.    Eyes:  Negative for photophobia and visual disturbance.   Respiratory:  Positive for shortness of breath. Negative for cough and wheezing.    Cardiovascular:  Negative for chest pain, palpitations and leg swelling.   Gastrointestinal:  Negative for abdominal distention and abdominal pain.   Genitourinary:  Negative for difficulty urinating and flank pain.   Musculoskeletal:  Negative for myalgias and neck stiffness.   Skin:  " Negative for color change and rash.   Neurological:  Negative for dizziness, seizures and headaches.   Hematological:  Negative for adenopathy.   Psychiatric/Behavioral:  Negative for agitation, hallucinations and sleep disturbance.      ALL OTHER SYSTEMS REVIEWED AND ARE NEGATIVE.    Physical Exam and Clinical Information   /80 (BP Location: Left arm, Patient Position: Lying)   Pulse 71   Temp 98 °F (36.7 °C) (Oral)   Resp 18   Ht 170.2 cm (67\")   Wt 72.6 kg (160 lb)   SpO2 97%   BMI 25.06 kg/m²   Physical Exam  Vitals and nursing note reviewed.   Constitutional:       General: She is not in acute distress.     Appearance: She is well-developed and normal weight. She is ill-appearing. She is not toxic-appearing.   HENT:      Head: Normocephalic and atraumatic.   Cardiovascular:      Rate and Rhythm: Normal rate and regular rhythm.      Pulses: Normal pulses.      Heart sounds: Normal heart sounds. No murmur heard.     No friction rub. No gallop.   Pulmonary:      Effort: Pulmonary effort is normal. No respiratory distress.      Breath sounds: Normal breath sounds. No wheezing, rhonchi or rales.   Musculoskeletal:      Right lower leg: No edema.      Left lower leg: No edema.   Skin:     General: Skin is warm and dry.   Neurological:      Mental Status: She is alert and oriented to person, place, and time.         Results from last 7 days   Lab Units 09/30/24 0447 09/29/24  0548 09/28/24  0401   WBC 10*3/mm3 14.35* 16.77* 16.19*   HEMOGLOBIN g/dL 10.7* 10.4* 10.2*   PLATELETS 10*3/mm3 420 428 428     Results from last 7 days   Lab Units 09/30/24 0447 09/29/24  0548 09/28/24  0401   SODIUM mmol/L 138 136 131*   POTASSIUM mmol/L 4.7 5.1 4.5   CO2 mmol/L 35.0* 32.0* 30.0*   BUN mg/dL 21 20 20   CREATININE mg/dL 0.55* 0.63 0.52*   MAGNESIUM mg/dL 1.7 1.8  --    PHOSPHORUS mg/dL 4.1 4.8*  --    GLUCOSE mg/dL 61* 144* 143*     Estimated Creatinine Clearance: 120 mL/min (A) (by C-G formula based on SCr of " 0.55 mg/dL (L)).  Results from last 7 days   Lab Units 09/28/24  0401   HEMOGLOBIN A1C % 12.10*     Results from last 7 days   Lab Units 09/27/24  0532   PH, ARTERIAL pH units 7.331*   PCO2, ARTERIAL mm Hg 58.8*   PO2 ART mm Hg 64.6*     Lab Results   Component Value Date    LACTATE 1.2 09/27/2024            I reviewed the patient's results/ Images and I agree with the reports     Impression     Mass of upper lobe of left lung    Type 2 diabetes mellitus with hyperglycemia, without long-term current use of insulin    Primary hypertension    Hyperlipidemia LDL goal <70    Elevated troponin    Lactic acid increased    HFrEF (heart failure with reduced ejection fraction)      Plan/Recommendations     - I personally reviewed the pts imaging from CT scan of the chest from 9/28/2024 showed a large left upper lobe lung mass.  - I personally reviewed the pts labs from this admission see above for more detail  - I personally reviewed the pts Echo report from 9/20/2024 showed a EF of 20%  - I personally reviewed the pts chart with regards to evaluation by cardiology and hospital medicine    -For the lung mass I would recommend undergoing CT-guided needle biopsy.  This lesion is also reachable by bronchoscopy.  But given her new EF of less than 20% he concern for underlying coronary artery disease.  I do not think would be wise to put her to sleep and I do not think it would provide us with much extra information to do this through bronchoscopy versus CT-guided needle biopsy.  -I have ordered a CT-guided needle biopsy to be done,  -Defer to cardiology to workup for CAD  -Wean oxygen to saturation greater than 88%  -Continue antibiotics for now  -Continue nebs as needed  -Pulmonary will continue to follow    LOULOU De La Cruz DO  Pulmonary and Critical Care Medicine  09/30/24 10:19 EDT     CC: No primary care provider on file.

## 2024-09-30 NOTE — PROGRESS NOTES
"          Clinical Nutrition Assessment     Patient Name: Pati SIMON  YOB: 1960  MRN: [id number]  Date of Encounter: 24 11:06 EDT  Admission date: 2024  Reason for Visit: MST score 2+, \"Unsure\" unintentional weight loss    Assessment   Nutrition Assessment   Admission Diagnosis:  Pneumonia [J18.9]    Problem List:    Mass of upper lobe of left lung    Type 2 diabetes mellitus with hyperglycemia, without long-term current use of insulin    Primary hypertension    Hyperlipidemia LDL goal <70    Elevated troponin    Lactic acid increased    HFrEF (heart failure with reduced ejection fraction)      PMH:   She  has a past medical history of Diabetes mellitus, GERD (gastroesophageal reflux disease), Hypertension, Pneumonia, Urinary tract infection, and Visual impairment.    PSH:  She  has a past surgical history that includes  section.    Applicable Nutrition History:       Anthropometrics     Height: Height: 170.2 cm (67\")  Last Filed Weight: Weight: 72.6 kg (160 lb) (24 0445)  Method: Weight Method: Stated  BMI: BMI (Calculated): 25.1    UBW:  170lbs per pt report  Weight change:  difficult to assess w/current wt data available   Weight       Weight (kg) Weight (lbs) Weight Method Visit Report   2023 77.021 kg  169 lb 12.8 oz   --    2023 73.029 kg  161 lb   --    2024 72.576 kg  160 lb  Stated       Nutrition Focused Physical Exam    Date:     Pt does not meet criteria for malnutrition diagnosis, at this time.      Subjective   Reported/Observed/Food/Nutrition Related History:     Pt denies any nutrition concerns PTA, states eating well and cooks 3 meals daily for herself. Pt does not suspect unintended wt loss, state her wt fluctuates. Pt denies chewing/swallowing difficulties. NKFA. Pt states she is hungry.     Current Nutrition Prescription   PO: NPO Diet NPO Type: Strict NPO  Oral Nutrition Supplement:   Intake: N/A    Assessment & Plan   Nutrition " Diagnosis   Date: 9/30             Updated:    Problem Inadequate oral intake    Etiology Pending coronary CTA   Signs/Symptoms NPO   Status: New    Goal / Objectives:   Nutrition to support treatment and Advance diet as medically feasible/appropriate      Nutrition Intervention      Follow treatment progress, Care plan reviewed, Await begin PO    Advance diet as feasible    Monitoring/Evaluation:   Per protocol, PO intake, POC/GOC    Karoline Darby MS,RD,LD  Time Spent:20

## 2024-09-30 NOTE — PROGRESS NOTES
"  [facility] at [facility]  PROGRESS NOTE    Date of Admission: 9/27/2024  Date of Service: 09/30/24    Primary Care Physician: No primary care provider on file.    Chief Complaint: HFrEF    Problem List:   Mass of upper lobe of left lung    Type 2 diabetes mellitus with hyperglycemia, without long-term current use of insulin    Primary hypertension    Hyperlipidemia LDL goal <70    Elevated troponin    Lactic acid increased    HFrEF (heart failure with reduced ejection fraction)      Subjective      No acute events overnight. No chest pain or shortness of breath. Remains on supplemental oxygen via NC.     Objective   Vitals: /80 (BP Location: Left arm, Patient Position: Lying)   Pulse 65   Temp 98 °F (36.7 °C) (Oral)   Resp 18   Ht 170.2 cm (67\")   Wt 72.6 kg (160 lb)   SpO2 97%   BMI 25.06 kg/m²     Physical Exam:  GENERAL:  in no acute distress.   HEENT:  no jugular venous distention  HEART: Regular rhythm, normal rate, and no murmurs, gallops, or rubs.  EXTREMITIES: 1+ BLE edema edema noted.     Results:  Results from last 7 days   Lab Units 09/30/24  0447 09/29/24  0548 09/28/24  0401   WBC 10*3/mm3 14.35* 16.77* 16.19*   HEMOGLOBIN g/dL 10.7* 10.4* 10.2*   HEMATOCRIT % 35.4 34.1 32.9*   PLATELETS 10*3/mm3 420 428 428     Results from last 7 days   Lab Units 09/30/24  0447 09/29/24  0548 09/28/24  0401   SODIUM mmol/L 138 136 131*   POTASSIUM mmol/L 4.7 5.1 4.5   CHLORIDE mmol/L 97* 97* 94*   CO2 mmol/L 35.0* 32.0* 30.0*   BUN mg/dL 21 20 20   CREATININE mg/dL 0.55* 0.63 0.52*   GLUCOSE mg/dL 61* 144* 143*      Lab Results   Component Value Date    CHOL 155 09/30/2024    TRIG 74 09/30/2024    HDL 44 09/28/2024    LDL 93 09/28/2024    AST 14 09/30/2024    ALT 24 09/30/2024     Results from last 7 days   Lab Units 09/28/24  0401   HEMOGLOBIN A1C % 12.10*     Results from last 7 days   Lab Units 09/30/24  0447 09/28/24  0401   CHOLESTEROL mg/dL 155 146   TRIGLYCERIDES mg/dL 74 " 39   HDL CHOL mg/dL  --  44   LDL CHOL mg/dL  --  93                 Results from last 7 days   Lab Units 09/27/24  0523   HSTROP T ng/L 36*     Results from last 7 days   Lab Units 09/28/24  0401   PROBNP pg/mL 8,460.0*         Intake/Output Summary (Last 24 hours) at 9/30/2024 0900  Last data filed at 9/30/2024 0646  Gross per 24 hour   Intake 120 ml   Output --   Net 120 ml       I personally reviewed the patient's EKG/Telemetry data    Radiology Data:   Results for orders placed during the hospital encounter of 09/27/24    Adult Transthoracic Echo Complete W/ Cont if Necessary Per Protocol    Interpretation Summary    Left ventricular ejection fraction appears to be less than 20%.    The left ventricular cavity is mild to moderately dilated.    The left atrial cavity is mildly dilated.    Estimated right ventricular systolic pressure from tricuspid regurgitation is mildly elevated (38 mmHg).    There is a left pleural effusion. There is a right pleural effusion.        Current Medications:  aspirin, 81 mg, Oral, Daily  atorvastatin, 40 mg, Oral, Nightly  cefTRIAXone, 1,000 mg, Intravenous, Q24H  doxycycline, 100 mg, Oral, Q12H  empagliflozin, 10 mg, Oral, Daily  enoxaparin, 40 mg, Subcutaneous, Daily  guaiFENesin, 1,200 mg, Oral, Q12H  insulin glargine, 10 Units, Subcutaneous, Q12H  insulin lispro, 2-9 Units, Subcutaneous, 4x Daily AC & at Bedtime  ipratropium-albuterol, 3 mL, Nebulization, 4x Daily - RT  [Held by provider] losartan, 25 mg, Oral, Daily  metoprolol tartrate, 25 mg, Oral, Q8H  midodrine, 5 mg, Oral, TID AC  pantoprazole, 40 mg, Oral, Q AM  sodium chloride, 10 mL, Intravenous, Q12H           Assessment:   Left lung mass concerning for invasive cancer  Plan for CT needle guided biopsy, per pulmonology     HFrEF, EF < 20%  Presently appears euvolemic  Needs ischemic evaluation  Will attempt to control heart rate and perform CT coronary angiogram tomorrow  Start digoxin for better control heart  rate  Metoprolol 25 mg 3 times daily      Type 2 diabetes not on insulin with hyperglycemia  Poorly controlled diabetes with A1c >12  Started on empagliflozin for HFrEF     Hypotension  Presently on midodrine  Furosemide discontinued yesterday     Hyperlipidemia with goal LDL <70  Start atorvastatin 40 mg daily    Plan:   Continue digoxin and metoprolol for rate control.   Plan for coronary CTA today as her rate is < 70.  May need to resume low dose lasix due to lower extremity edema. Continue to monitor BP and LE edema.   Further recommendations to follow coronary CTA.      Anastasia Huber PA-C

## 2024-09-30 NOTE — PROGRESS NOTES
[facility] Medicine Services  PROGRESS NOTE    Patient Name: Pati SIMON  : 1960  MRN: [id number]    Date of Admission: 2024  Primary Care Physician: No primary care provider on file.    Subjective   Subjective     CC:  For shortness of breath    HPI:  Patient had episode of hypoglycemia earlier this morning.  Improved with orange juice.  Patient was sleeping this morning.  Plan for coronary CTA this morning.    Objective   Objective     Vital Signs:   Temp:  [97.7 °F (36.5 °C)-98.6 °F (37 °C)] 98 °F (36.7 °C)  Heart Rate:  [64-86] 65  Resp:  [16-22] 18  BP: ()/(61-80) 103/80  Flow (L/min):  [2] 2     Physical Exam:  General: Comfortable, not in distress, conversant and cooperative  Head: Atraumatic and normocephalic  Eyes: No Icterus. No pallor  Ears:  Ears appear intact with no abnormalities noted  Throat: No oral lesions, no thrush  Neck: Supple, trachea midline  Lungs: Markedly diminished air entry left lung base.  Bilateral wheezing  Heart:  Normal S1 and S2, no murmur, no gallop, No JVD, 3+ lower extremity swelling  Abdomen:  Soft, no tenderness, no organomegaly, normal bowel sounds, no organomegaly  Extremities: pulses equal bilaterally  Skin: No bleeding, bruising or rash, normal skin turgor and elasticity  Neurologic: Cranial nerves appear intact with no evidence of facial asymmetry, normal motor and sensory functions in all 4 extremities  Psych: Alert and oriented x 3, normal mood    Results Reviewed:  LAB RESULTS:      Lab 24  0447 24  0548 24  0401 24  0900 24  0523   WBC 14.35* 16.77* 16.19*  --  17.88*   HEMOGLOBIN 10.7* 10.4* 10.2*  --  11.7*   HEMATOCRIT 35.4 34.1 32.9*  --  37.7   PLATELETS 420 428 428  --  543*   NEUTROS ABS 10.43* 12.79*  --   --  15.22*   IMMATURE GRANS (ABS) 0.07* 0.06*  --   --  0.08*   LYMPHS ABS 1.73 1.96  --   --  1.02   MONOS ABS 2.04* 1.92*  --   --  1.50*   EOS ABS 0.05 0.02  --   --  0.02    MCV 81.9 81.2 81.0  --  80.0   CRP  --   --  4.30*  --   --    PROCALCITONIN  --   --  0.19  --   --    LACTATE  --   --   --  1.2 2.5*   HSTROP T  --   --   --   --  36*         Lab 09/30/24  0447 09/29/24  0548 09/28/24  0401 09/27/24  0523   SODIUM 138 136 131* 131*   POTASSIUM 4.7 5.1 4.5 4.1   CHLORIDE 97* 97* 94* 89*   CO2 35.0* 32.0* 30.0* 29.0   ANION GAP 6.0 7.0 7.0 13.0   BUN 21 20 20 10   CREATININE 0.55* 0.63 0.52* 0.52*   EGFR 103.1 99.8 104.5 104.5   GLUCOSE 61* 144* 143* 592*   CALCIUM 8.8 9.4 9.2 9.1   MAGNESIUM 1.7 1.8  --   --    PHOSPHORUS 4.1 4.8*  --   --    HEMOGLOBIN A1C  --   --  12.10*  --          Lab 09/30/24 0447 09/29/24  0548 09/27/24  0523   TOTAL PROTEIN 5.6* 6.3 7.0   ALBUMIN 3.2* 3.2* 3.6   GLOBULIN 2.4 3.1 3.4   ALT (SGPT) 24 32 16   AST (SGOT) 14 29 38*   BILIRUBIN 0.2 <0.2 0.4   ALK PHOS 137* 158* 163*         Lab 09/28/24  0401 09/27/24  0523   PROBNP 8,460.0* 6,196.0*   HSTROP T  --  36*         Lab 09/30/24 0447 09/28/24  0401   CHOLESTEROL 155 146   LDL CHOL  --  93   HDL CHOL  --  44   TRIGLYCERIDES 74 39             Lab 09/27/24  0532   PH, ARTERIAL 7.331*   PCO2, ARTERIAL 58.8*   PO2 ART 64.6*   FIO2 36   HCO3 ART 31.0*   BASE EXCESS ART 3.8*   CARBOXYHEMOGLOBIN 5.2*     Brief Urine Lab Results       None            Microbiology Results Abnormal       Procedure Component Value - Date/Time    Blood Culture - Blood, Arm, Right [[id number]]  (Normal) Collected: 09/27/24 0601    Lab Status: Preliminary result Specimen: Blood from Arm, Right Updated: 09/30/24 0645     Blood Culture No growth at 3 days    Narrative:      Less than seven (7) mL's of blood was collected.  Insufficient quantity may yield false negative results.    Blood Culture - Blood, Arm, Left [[id number]]  (Normal) Collected: 09/27/24 0459    Lab Status: Preliminary result Specimen: Blood from Arm, Left Updated: 09/30/24 0645     Blood Culture No growth at 3 days    Narrative:      Less than seven (7) mL's of  blood was collected.  Insufficient quantity may yield false negative results.    MRSA Screen, PCR (Inpatient) - Swab, Nares [[id number]]  (Normal) Collected: 09/28/24 1157    Lab Status: Final result Specimen: Swab from Nares Updated: 09/28/24 1413     MRSA PCR Negative    Narrative:      The negative predictive value of this diagnostic test is high and should only be used to consider de-escalating anti-MRSA therapy. A positive result may indicate colonization with MRSA and must be correlated clinically.  MRSA Negative    S. Pneumo Ag Urine or CSF - Urine, Urine, Clean Catch [[id number]]  (Normal) Collected: 09/27/24 1258    Lab Status: Final result Specimen: Urine, Clean Catch Updated: 09/27/24 1909     Strep Pneumo Ag Negative    Legionella Antigen, Urine - Urine, Urine, Clean Catch [[id number]]  (Normal) Collected: 09/27/24 1258    Lab Status: Final result Specimen: Urine, Clean Catch Updated: 09/27/24 1908     LEGIONELLA ANTIGEN, URINE Negative    Narrative:      2025-09-26    Respiratory Panel PCR w/COVID-19(SARS-CoV-2) ARNAV/NAIMA/AUNG/PAD/COR/JE In-House, NP Swab in UTM/VTM, 2 HR TAT - Swab, Nasopharynx [[id number]]  (Normal) Collected: 09/27/24 0524    Lab Status: Final result Specimen: Swab from Nasopharynx Updated: 09/27/24 0621     ADENOVIRUS, PCR Not Detected     Coronavirus 229E Not Detected     Coronavirus HKU1 Not Detected     Coronavirus NL63 Not Detected     Coronavirus OC43 Not Detected     COVID19 Not Detected     Human Metapneumovirus Not Detected     Human Rhinovirus/Enterovirus Not Detected     Influenza A PCR Not Detected     Influenza B PCR Not Detected     Parainfluenza Virus 1 Not Detected     Parainfluenza Virus 2 Not Detected     Parainfluenza Virus 3 Not Detected     Parainfluenza Virus 4 Not Detected     RSV, PCR Not Detected     Bordetella pertussis pcr Not Detected     Bordetella parapertussis PCR Not Detected     Chlamydophila pneumoniae PCR Not Detected     Mycoplasma pneumo by PCR Not  Detected    Narrative:      In the setting of a positive respiratory panel with a viral infection PLUS a negative procalcitonin without other underlying concern for bacterial infection, consider observing off antibiotics or discontinuation of antibiotics and continue supportive care. If the respiratory panel is positive for atypical bacterial infection (Bordetella pertussis, Chlamydophila pneumoniae, or Mycoplasma pneumoniae), consider antibiotic de-escalation to target atypical bacterial infection.            Adult Transthoracic Echo Complete W/ Cont if Necessary Per Protocol    Result Date: 9/28/2024    Left ventricular ejection fraction appears to be less than 20%.   The left ventricular cavity is mild to moderately dilated.   The left atrial cavity is mildly dilated.   Estimated right ventricular systolic pressure from tricuspid regurgitation is mildly elevated (38 mmHg).   There is a left pleural effusion. There is a right pleural effusion.     CT Angiogram Chest Pulmonary Embolism    Result Date: 9/28/2024  CT ANGIOGRAM CHEST PULMONARY EMBOLISM Date of Exam: 9/28/2024 12:02 PM EDT Indication: Pulmonary embolism (PE) suspected, high prob COMPLEX LE PL EFFUSION, R/O PE. Comparison: None available. Technique: Axial CT images were obtained of the chest after the uneventful intravenous administration of intravenous contrast. Utilizing pulmonary embolism protocol.  Reconstructed coronal and sagittal images were also obtained. Automated exposure control and iterative construction methods were used. Findings: The trachea is patent. There is a large consolidative mass in the left upper lobe measuring approximately 10 x 11 cm extending between the visceral and parietal pleura encasing the left hilar structures. There is a soft tissue mass at the left main bronchus lobar bifurcation. There is a small left pleural effusion. There is a 5 mm pulmonary nodule at the anterior right lung base (image 78). Minimal right  posterior basilar atelectasis. 3 mm subpleural nodule right lower lobe (image 72). 5 mm nodule right lower lobe (image 69). The right lung is otherwise clear. Pulmonary arteries: Adequate opacification of the pulmonary arteries. No evidence of acute pulmonary embolism. There is encasement of the lingular segmental and subsegmental branches Borderline cardiomegaly. Thoracic aorta appears within normal limits in size with atherosclerosis. Mediastinal structures not clearly evaluated for lymphadenopathy due to protocol technique. There is mild stranding with edematous appearance of the left axillary region     Impression: Impression: 1. Large left upper lobe consolidative mass compatible with neoplasm 2. Small left pleural effusion 3. No evidence of pulmonary embolism Electronically Signed: Panfilo Simon MD  9/28/2024 12:50 PM EDT  Workstation ID: [id number]     Results for orders placed during the hospital encounter of 09/27/24    Adult Transthoracic Echo Complete W/ Cont if Necessary Per Protocol    Interpretation Summary    Left ventricular ejection fraction appears to be less than 20%.    The left ventricular cavity is mild to moderately dilated.    The left atrial cavity is mildly dilated.    Estimated right ventricular systolic pressure from tricuspid regurgitation is mildly elevated (38 mmHg).    There is a left pleural effusion. There is a right pleural effusion.      Current medications:  Scheduled Meds:aspirin, 81 mg, Oral, Daily  atorvastatin, 40 mg, Oral, Nightly  cefTRIAXone, 1,000 mg, Intravenous, Q24H  doxycycline, 100 mg, Oral, Q12H  empagliflozin, 10 mg, Oral, Daily  enoxaparin, 40 mg, Subcutaneous, Daily  guaiFENesin, 1,200 mg, Oral, Q12H  insulin glargine, 10 Units, Subcutaneous, Q12H  insulin lispro, 2-9 Units, Subcutaneous, 4x Daily AC & at Bedtime  ipratropium-albuterol, 3 mL, Nebulization, 4x Daily - RT  [Held by provider] losartan, 25 mg, Oral, Daily  metoprolol tartrate, 25 mg, Oral,  "Q8H  midodrine, 5 mg, Oral, TID AC  pantoprazole, 40 mg, Oral, Q AM  sodium chloride, 10 mL, Intravenous, Q12H      Continuous Infusions:     PRN Meds:.  acetaminophen **OR** acetaminophen **OR** acetaminophen    senna-docusate sodium **AND** polyethylene glycol **AND** bisacodyl **AND** bisacodyl    Calcium Replacement - Follow Nurse / BPA Driven Protocol    cyclobenzaprine    dextrose    dextrose    glucagon (human recombinant)    Magnesium Standard Dose Replacement - Follow Nurse / BPA Driven Protocol    ondansetron ODT **OR** ondansetron    Phosphorus Replacement - Follow Nurse / BPA Driven Protocol    Potassium Replacement - Follow Nurse / BPA Driven Protocol    sodium chloride    sodium chloride    sodium chloride    Assessment & Plan   Assessment & Plan     Active Hospital Problems    Diagnosis  POA    **Mass of upper lobe of left lung [R91.8]  Yes    HFrEF (heart failure with reduced ejection fraction) [I50.20]  Yes    Elevated troponin [R79.89]  Yes    Lactic acid increased [E87.20]  Yes    Type 2 diabetes mellitus with hyperglycemia, without long-term current use of insulin [E11.65]  Yes    Primary hypertension [I10]  Yes    Hyperlipidemia LDL goal <70 [E78.5]  Yes      Resolved Hospital Problems    Diagnosis Date Resolved POA    Pneumonia [J18.9] 09/28/2024 Yes    Acute respiratory failure with hypoxia [J96.01] 09/29/2024 Yes    Sepsis [A41.9] 09/28/2024 Yes        Brief Hospital Course to date:  Pati SIMON is a 64 y.o. female presenting to ED with SOA. Says she started to feel ill about 1 week ago at which time \"felt like she had a cold going on.\" Over the week continued to feel weaker. Developed SOA which hit suddenly late last night prompting her to come to ED. Feels better now after receiving tx in ED. Denies cough, high fevers.    Acute decompensated systolic heart failure, improved  Elevated troponin, likely demand ischemia  Patient with symptoms suggestive of congestive heart failure.  She " reported that she has been having worsening shortness of breath and lower extremity swelling x 2 weeks  Bedside point-of-care ultrasound concerning for severely reduced left ventricular function.    Formal echo with severe systolic dysfunction, EF less than 20%  Status post IV Lasix diuresis with improvement of her dyspnea  Discussed with Dr. Wiley/cardiology.  Plan for heart rate control today with metoprolol and digoxin and CCTA today to evaluate for coronary artery disease as a cause of her systolic heart failure  Defer further diuresis to cardiology team    Hypotension  In the view of newly diagnosed systolic heart failure  Continue midodrine  Will be limiting factor to escalate systolic heart failure goal-directed therapy    Newly diagnosed left lung neoplasm  CTA chest showed left upper lung neoplasm concerning for malignancy  Pulmonary consult to evaluate the patient for bronc and biopsy versus CT-guided biopsy.    Will need oncology referral upon discharge  Lack of insurance will be a challenge.  Case management consulted    Possible community-acquired pneumonia   Leukocytosis   Even though the patient does not have any convincing signs of pneumonia, she does have leukocytosis and some infiltrates in the left upper lung lobe  Continue IV Rocephin and doxycycline and follow cultures  Continue DuoNebs      Uncontrolled DM w/ hyperglycemia with episodes of hypoglycemia  A1c 7.9%  Episodes of hypoglycemia overnight.  Hold long-acting insulin.  Hold Jardiance.  Okay to continue sliding scale       Essential hypertension  Dyslipidemia  Blood pressure is borderline.  Holding home blood pressure medications: Amlodipine and losartan    Expected Discharge Location and Transportation: TBD  Expected Discharge   Expected Discharge Date: 10/1/2024; Expected Discharge Time:      VTE Prophylaxis:  Pharmacologic VTE prophylaxis orders are present.         AM-PAC 6 Clicks Score (PT): 24 (09/29/24 3700)    CODE STATUS:    Code Status and Medical Interventions: CPR (Attempt to Resuscitate); Full Support   Ordered at: 09/27/24 0713     Code Status (Patient has no pulse and is not breathing):    CPR (Attempt to Resuscitate)     Medical Interventions (Patient has pulse or is breathing):    Full Support     Copied text in this note has been reviewed and is accurate as of 09/30/24.     Romana Whiting MD  09/30/24

## 2024-09-30 NOTE — CASE MANAGEMENT/SOCIAL WORK
Continued Stay Note  [facility]     Patient Name: Pati VAN  MRN: [id number]  Today's Date: 9/30/2024    Admit Date: 9/27/2024    Plan: Home   Discharge Plan       Row Name 09/30/24 1626       Plan    Plan Home    Plan Comments Per MDR, Ms. Van may be ready for discharge in 1-2 days.  She was in CT scan when I stopped by her room earlier today.  PT has recommended home with assist.  I have entered [facility] retail pharmacy/[facility] in [facility] r/t reports of difficulty affording /obtaining prescription medications.  Medicaid insurance pending.  CM will cont to follow evolving plan of care and provide assistance with discharge planning as recommendations become available.    Final Discharge Disposition Code 01 - home or self-care                   Discharge Codes    No documentation.                 Expected Discharge Date and Time       Expected Discharge Date Expected Discharge Time    Oct 1, 2024               Shivani Foster RN

## 2024-10-01 PROBLEM — I24.89 DEMAND ISCHEMIA: Status: ACTIVE | Noted: 2024-09-27

## 2024-10-01 LAB
ALBUMIN SERPL-MCNC: 2.8 G/DL (ref 3.5–5.2)
ALBUMIN/GLOB SERPL: 1.2 G/DL
ALP SERPL-CCNC: 117 U/L (ref 39–117)
ALT SERPL W P-5'-P-CCNC: 16 U/L (ref 1–33)
ANION GAP SERPL CALCULATED.3IONS-SCNC: 8 MMOL/L (ref 5–15)
AST SERPL-CCNC: 14 U/L (ref 1–32)
BASOPHILS # BLD AUTO: 0.02 10*3/MM3 (ref 0–0.2)
BASOPHILS NFR BLD AUTO: 0.2 % (ref 0–1.5)
BILIRUB SERPL-MCNC: 0.2 MG/DL (ref 0–1.2)
BUN SERPL-MCNC: 15 MG/DL (ref 8–23)
BUN/CREAT SERPL: 25.9 (ref 7–25)
CALCIUM SPEC-SCNC: 8.4 MG/DL (ref 8.6–10.5)
CHLORIDE SERPL-SCNC: 95 MMOL/L (ref 98–107)
CO2 SERPL-SCNC: 30 MMOL/L (ref 22–29)
CREAT SERPL-MCNC: 0.58 MG/DL (ref 0.57–1)
DEPRECATED RDW RBC AUTO: 47.9 FL (ref 37–54)
EGFRCR SERPLBLD CKD-EPI 2021: 101.8 ML/MIN/1.73
EOSINOPHIL # BLD AUTO: 0.04 10*3/MM3 (ref 0–0.4)
EOSINOPHIL NFR BLD AUTO: 0.3 % (ref 0.3–6.2)
ERYTHROCYTE [DISTWIDTH] IN BLOOD BY AUTOMATED COUNT: 16.1 % (ref 12.3–15.4)
GLOBULIN UR ELPH-MCNC: 2.3 GM/DL
GLUCOSE BLDC GLUCOMTR-MCNC: 140 MG/DL (ref 70–130)
GLUCOSE BLDC GLUCOMTR-MCNC: 212 MG/DL (ref 70–130)
GLUCOSE BLDC GLUCOMTR-MCNC: 297 MG/DL (ref 70–130)
GLUCOSE BLDC GLUCOMTR-MCNC: 327 MG/DL (ref 70–130)
GLUCOSE BLDC GLUCOMTR-MCNC: 358 MG/DL (ref 70–130)
GLUCOSE SERPL-MCNC: 102 MG/DL (ref 65–99)
HCT VFR BLD AUTO: 36.3 % (ref 34–46.6)
HGB BLD-MCNC: 11.1 G/DL (ref 12–15.9)
IMM GRANULOCYTES # BLD AUTO: 0.05 10*3/MM3 (ref 0–0.05)
IMM GRANULOCYTES NFR BLD AUTO: 0.4 % (ref 0–0.5)
LYMPHOCYTES # BLD AUTO: 1.35 10*3/MM3 (ref 0.7–3.1)
LYMPHOCYTES NFR BLD AUTO: 10.9 % (ref 19.6–45.3)
MAGNESIUM SERPL-MCNC: 2 MG/DL (ref 1.6–2.4)
MCH RBC QN AUTO: 25.1 PG (ref 26.6–33)
MCHC RBC AUTO-ENTMCNC: 30.6 G/DL (ref 31.5–35.7)
MCV RBC AUTO: 81.9 FL (ref 79–97)
MONOCYTES # BLD AUTO: 1.72 10*3/MM3 (ref 0.1–0.9)
MONOCYTES NFR BLD AUTO: 13.9 % (ref 5–12)
NEUTROPHILS NFR BLD AUTO: 74.3 % (ref 42.7–76)
NEUTROPHILS NFR BLD AUTO: 9.21 10*3/MM3 (ref 1.7–7)
NRBC BLD AUTO-RTO: 0 /100 WBC (ref 0–0.2)
PLATELET # BLD AUTO: 339 10*3/MM3 (ref 140–450)
PMV BLD AUTO: 10.7 FL (ref 6–12)
POTASSIUM SERPL-SCNC: 5.5 MMOL/L (ref 3.5–5.2)
PROT SERPL-MCNC: 5.1 G/DL (ref 6–8.5)
RBC # BLD AUTO: 4.43 10*6/MM3 (ref 3.77–5.28)
SODIUM SERPL-SCNC: 133 MMOL/L (ref 136–145)
WBC NRBC COR # BLD AUTO: 12.39 10*3/MM3 (ref 3.4–10.8)

## 2024-10-01 PROCEDURE — 25010000002 ENOXAPARIN PER 10 MG: Performed by: INTERNAL MEDICINE

## 2024-10-01 PROCEDURE — 63710000001 INSULIN LISPRO (HUMAN) PER 5 UNITS: Performed by: INTERNAL MEDICINE

## 2024-10-01 PROCEDURE — 82948 REAGENT STRIP/BLOOD GLUCOSE: CPT

## 2024-10-01 PROCEDURE — 99232 SBSQ HOSP IP/OBS MODERATE 35: CPT | Performed by: HOSPITALIST

## 2024-10-01 PROCEDURE — 25010000002 CEFTRIAXONE PER 250 MG: Performed by: INTERNAL MEDICINE

## 2024-10-01 PROCEDURE — 94799 UNLISTED PULMONARY SVC/PX: CPT

## 2024-10-01 PROCEDURE — 80053 COMPREHEN METABOLIC PANEL: CPT | Performed by: HOSPITALIST

## 2024-10-01 PROCEDURE — 85025 COMPLETE CBC W/AUTO DIFF WBC: CPT | Performed by: HOSPITALIST

## 2024-10-01 PROCEDURE — 99232 SBSQ HOSP IP/OBS MODERATE 35: CPT | Performed by: NURSE PRACTITIONER

## 2024-10-01 PROCEDURE — 83735 ASSAY OF MAGNESIUM: CPT | Performed by: HOSPITALIST

## 2024-10-01 RX ORDER — EZETIMIBE 10 MG/1
10 TABLET ORAL DAILY
Status: DISCONTINUED | OUTPATIENT
Start: 2024-10-02 | End: 2024-10-04

## 2024-10-01 RX ADMIN — Medication 10 ML: at 21:47

## 2024-10-01 RX ADMIN — SODIUM CHLORIDE 1000 MG: 900 INJECTION INTRAVENOUS at 06:19

## 2024-10-01 RX ADMIN — METOPROLOL TARTRATE 25 MG: 25 TABLET, FILM COATED ORAL at 10:57

## 2024-10-01 RX ADMIN — DOXYCYCLINE 100 MG: 100 CAPSULE ORAL at 20:54

## 2024-10-01 RX ADMIN — INSULIN LISPRO 4 UNITS: 100 INJECTION, SOLUTION INTRAVENOUS; SUBCUTANEOUS at 11:56

## 2024-10-01 RX ADMIN — IPRATROPIUM BROMIDE AND ALBUTEROL SULFATE 3 ML: 2.5; .5 SOLUTION RESPIRATORY (INHALATION) at 19:38

## 2024-10-01 RX ADMIN — DOXYCYCLINE 100 MG: 100 CAPSULE ORAL at 08:54

## 2024-10-01 RX ADMIN — INSULIN LISPRO 8 UNITS: 100 INJECTION, SOLUTION INTRAVENOUS; SUBCUTANEOUS at 20:54

## 2024-10-01 RX ADMIN — GUAIFENESIN 1200 MG: 600 TABLET, EXTENDED RELEASE ORAL at 08:54

## 2024-10-01 RX ADMIN — ENOXAPARIN SODIUM 40 MG: 100 INJECTION SUBCUTANEOUS at 08:53

## 2024-10-01 RX ADMIN — IPRATROPIUM BROMIDE AND ALBUTEROL SULFATE 3 ML: 2.5; .5 SOLUTION RESPIRATORY (INHALATION) at 16:00

## 2024-10-01 RX ADMIN — METOPROLOL TARTRATE 25 MG: 25 TABLET, FILM COATED ORAL at 02:14

## 2024-10-01 RX ADMIN — PANTOPRAZOLE SODIUM 40 MG: 40 TABLET, DELAYED RELEASE ORAL at 06:18

## 2024-10-01 RX ADMIN — MIDODRINE HYDROCHLORIDE 5 MG: 5 TABLET ORAL at 11:56

## 2024-10-01 RX ADMIN — INSULIN LISPRO 6 UNITS: 100 INJECTION, SOLUTION INTRAVENOUS; SUBCUTANEOUS at 17:43

## 2024-10-01 RX ADMIN — Medication 10 ML: at 08:56

## 2024-10-01 RX ADMIN — IPRATROPIUM BROMIDE AND ALBUTEROL SULFATE 3 ML: 2.5; .5 SOLUTION RESPIRATORY (INHALATION) at 07:16

## 2024-10-01 RX ADMIN — IPRATROPIUM BROMIDE AND ALBUTEROL SULFATE 3 ML: 2.5; .5 SOLUTION RESPIRATORY (INHALATION) at 12:22

## 2024-10-01 RX ADMIN — METOPROLOL TARTRATE 25 MG: 25 TABLET, FILM COATED ORAL at 17:43

## 2024-10-01 RX ADMIN — MIDODRINE HYDROCHLORIDE 5 MG: 5 TABLET ORAL at 17:43

## 2024-10-01 RX ADMIN — ASPIRIN 81 MG: 81 TABLET, COATED ORAL at 08:54

## 2024-10-01 RX ADMIN — MIDODRINE HYDROCHLORIDE 5 MG: 5 TABLET ORAL at 06:20

## 2024-10-01 RX ADMIN — GUAIFENESIN 1200 MG: 600 TABLET, EXTENDED RELEASE ORAL at 20:54

## 2024-10-01 NOTE — PROGRESS NOTES
[facility] Medicine Services  PROGRESS NOTE    Patient Name: Pati SIMON  : 1960  MRN: [id number]    Date of Admission: 2024  Primary Care Physician: No primary care provider on file.    Subjective   Subjective     CC:  For shortness of breath    HPI:  Acute events over the night.  Patient is feeling better.  Coronary CTA reviewed.  Continue medical treatment.  Plan for CT-guided biopsy tomorrow.    Objective   Objective     Vital Signs:   Temp:  [97.4 °F (36.3 °C)-97.9 °F (36.6 °C)] 97.7 °F (36.5 °C)  Heart Rate:  [] 65  Resp:  [16-20] 16  BP: ()/(56-80) 114/63  Flow (L/min):  [2] 2     Physical Exam:  General: Comfortable, not in distress, conversant and cooperative  Head: Atraumatic and normocephalic  Eyes: No Icterus. No pallor  Ears:  Ears appear intact with no abnormalities noted  Throat: No oral lesions, no thrush  Neck: Supple, trachea midline  Lungs: Markedly diminished air entry left lung base.  Bilateral wheezing  Heart:  Normal S1 and S2, no murmur, no gallop, No JVD, 3+ lower extremity swelling  Abdomen:  Soft, no tenderness, no organomegaly, normal bowel sounds, no organomegaly  Extremities: pulses equal bilaterally  Skin: No bleeding, bruising or rash, normal skin turgor and elasticity  Neurologic: Cranial nerves appear intact with no evidence of facial asymmetry, normal motor and sensory functions in all 4 extremities  Psych: Alert and oriented x 3, normal mood    Results Reviewed:  LAB RESULTS:      Lab 10/01/24  0445 24  0447 24  0548 24  0401 24  0900 24  0523   WBC 12.39* 14.35* 16.77* 16.19*  --  17.88*   HEMOGLOBIN 11.1* 10.7* 10.4* 10.2*  --  11.7*   HEMATOCRIT 36.3 35.4 34.1 32.9*  --  37.7   PLATELETS 339 420 428 428  --  543*   NEUTROS ABS 9.21* 10.43* 12.79*  --   --  15.22*   IMMATURE GRANS (ABS) 0.05 0.07* 0.06*  --   --  0.08*   LYMPHS ABS 1.35 1.73 1.96  --   --  1.02   MONOS ABS 1.72* 2.04* 1.92*  --    --  1.50*   EOS ABS 0.04 0.05 0.02  --   --  0.02   MCV 81.9 81.9 81.2 81.0  --  80.0   CRP  --   --   --  4.30*  --   --    PROCALCITONIN  --   --   --  0.19  --   --    LACTATE  --   --   --   --  1.2 2.5*   HSTROP T  --   --   --   --   --  36*         Lab 10/01/24  0445 09/30/24  0447 09/29/24  0548 09/28/24  0401 09/27/24  0523   SODIUM 133* 138 136 131* 131*   POTASSIUM 5.5* 4.7 5.1 4.5 4.1   CHLORIDE 95* 97* 97* 94* 89*   CO2 30.0* 35.0* 32.0* 30.0* 29.0   ANION GAP 8.0 6.0 7.0 7.0 13.0   BUN 15 21 20 20 10   CREATININE 0.58 0.55* 0.63 0.52* 0.52*   EGFR 101.8 103.1 99.8 104.5 104.5   GLUCOSE 102* 61* 144* 143* 592*   CALCIUM 8.4* 8.8 9.4 9.2 9.1   MAGNESIUM 2.0 1.7 1.8  --   --    PHOSPHORUS  --  4.1 4.8*  --   --    HEMOGLOBIN A1C  --   --   --  12.10*  --          Lab 10/01/24  0445 09/30/24  0447 09/29/24  0548 09/27/24  0523   TOTAL PROTEIN 5.1* 5.6* 6.3 7.0   ALBUMIN 2.8* 3.2* 3.2* 3.6   GLOBULIN 2.3 2.4 3.1 3.4   ALT (SGPT) 16 24 32 16   AST (SGOT) 14 14 29 38*   BILIRUBIN 0.2 0.2 <0.2 0.4   ALK PHOS 117 137* 158* 163*         Lab 09/28/24 0401 09/27/24  0523   PROBNP 8,460.0* 6,196.0*   HSTROP T  --  36*         Lab 09/30/24 0447 09/28/24  0401   CHOLESTEROL 155 146   LDL CHOL  --  93   HDL CHOL  --  44   TRIGLYCERIDES 74 39             Lab 09/27/24  0532   PH, ARTERIAL 7.331*   PCO2, ARTERIAL 58.8*   PO2 ART 64.6*   FIO2 36   HCO3 ART 31.0*   BASE EXCESS ART 3.8*   CARBOXYHEMOGLOBIN 5.2*     Brief Urine Lab Results       None            Microbiology Results Abnormal       Procedure Component Value - Date/Time    Blood Culture - Blood, Arm, Right [[id number]]  (Normal) Collected: 09/27/24 0601    Lab Status: Preliminary result Specimen: Blood from Arm, Right Updated: 10/01/24 0645     Blood Culture No growth at 4 days    Narrative:      Less than seven (7) mL's of blood was collected.  Insufficient quantity may yield false negative results.    Blood Culture - Blood, Arm, Left [[id number]]  (Normal)  Collected: 09/27/24 0459    Lab Status: Preliminary result Specimen: Blood from Arm, Left Updated: 10/01/24 0645     Blood Culture No growth at 4 days    Narrative:      Less than seven (7) mL's of blood was collected.  Insufficient quantity may yield false negative results.    MRSA Screen, PCR (Inpatient) - Swab, Nares [[id number]]  (Normal) Collected: 09/28/24 1157    Lab Status: Final result Specimen: Swab from Nares Updated: 09/28/24 1413     MRSA PCR Negative    Narrative:      The negative predictive value of this diagnostic test is high and should only be used to consider de-escalating anti-MRSA therapy. A positive result may indicate colonization with MRSA and must be correlated clinically.  MRSA Negative    S. Pneumo Ag Urine or CSF - Urine, Urine, Clean Catch [[id number]]  (Normal) Collected: 09/27/24 1258    Lab Status: Final result Specimen: Urine, Clean Catch Updated: 09/27/24 1909     Strep Pneumo Ag Negative    Legionella Antigen, Urine - Urine, Urine, Clean Catch [[id number]]  (Normal) Collected: 09/27/24 1258    Lab Status: Final result Specimen: Urine, Clean Catch Updated: 09/27/24 1908     LEGIONELLA ANTIGEN, URINE Negative    Narrative:      2025-09-26    Respiratory Panel PCR w/COVID-19(SARS-CoV-2) ARNAV/NAIMA/AUNG/PAD/COR/JE In-House, NP Swab in UTM/VTM, 2 HR TAT - Swab, Nasopharynx [[id number]]  (Normal) Collected: 09/27/24 0524    Lab Status: Final result Specimen: Swab from Nasopharynx Updated: 09/27/24 0621     ADENOVIRUS, PCR Not Detected     Coronavirus 229E Not Detected     Coronavirus HKU1 Not Detected     Coronavirus NL63 Not Detected     Coronavirus OC43 Not Detected     COVID19 Not Detected     Human Metapneumovirus Not Detected     Human Rhinovirus/Enterovirus Not Detected     Influenza A PCR Not Detected     Influenza B PCR Not Detected     Parainfluenza Virus 1 Not Detected     Parainfluenza Virus 2 Not Detected     Parainfluenza Virus 3 Not Detected     Parainfluenza Virus 4 Not Detected      RSV, PCR Not Detected     Bordetella pertussis pcr Not Detected     Bordetella parapertussis PCR Not Detected     Chlamydophila pneumoniae PCR Not Detected     Mycoplasma pneumo by PCR Not Detected    Narrative:      In the setting of a positive respiratory panel with a viral infection PLUS a negative procalcitonin without other underlying concern for bacterial infection, consider observing off antibiotics or discontinuation of antibiotics and continue supportive care. If the respiratory panel is positive for atypical bacterial infection (Bordetella pertussis, Chlamydophila pneumoniae, or Mycoplasma pneumoniae), consider antibiotic de-escalation to target atypical bacterial infection.            CT Angiogram Coronary-Cardiology Interpretation    Result Date: 10/1/2024  Table formatting from the original result was not included. Images from the original result were not included. CORONARY CT ANGIOGRAPHY WITH CALCIUM SCORE CLINICAL HISTORY:  Cardiomyopathy TECHNIQUE: Using a Siemens Force scanner, a preliminary  study was obtained, followed by coronary artery calcium protocol.  0.5 mm collimated images were obtained through the coronary arteries.  Data were transferred offline for 3D reconstructions using [email or web address]. CONTRAST: 65 mL iopamidol (ISOVUE-370) ACQUISITION: Retrospective ECG triggered acquisition was used.  Heart rate at the time of acquisition was approximately 60 BPM. MEDICATIONS: Nitroglycerin 0.8 mg sublingual tablet TECHNICAL QUALITY: Excellent, with no artifacts FINDINGS: Coronary artery calcification findings: The total calcium score is 367.3 indicating severe (-999) calcified plaque in the coronary tree. Left main 54.2 .9 LCx 176 RCA 21.2 Grading Scale for Plaque [place]: P1 (CAC 1-100) Mild amount of plaque P2 (-300) Moderate amount of plaque P3 (-999) Severe amount of plaque P4 (CAC > 1000) Extensive amount of plaque Angiographic Findings: The coronary anatomy is  left dominant. Left Main: Normal caliber vessel which bifurcates in the LAD and left circumflex coronary artery.  There is mild calcification associated with no stenosis. LAD: Moderate-sized vessel which gives off 1 moderate-sized diagonal branch.  Scattered calcified plaque in the proximal and mid segment.  In the mid LAD distal to the first diagonal bifurcation, there is focal moderate stenosis.  The moderate-sized diagonal branch has a moderate ostial stenosis (CAD RADS 3) LCX: Large dominant vessel which gives off 1 large obtuse marginal branch as well as a left PDA.  There is scattered calcified plaque in the left circumflex.  There is mild to moderate disease of the first obtuse marginal branch.  The left PDA has mild stenosis. RCA: Small nondominant vessel with mild calcified Grading Scale for Stenosis Severity: Category Degree Interpretation CAD-RADS 0 0% (No plaque or stenosis) Absence of CAD CAD-RADS 1 1-24% (Minimal stenosis or plaque w/o stenosis) Minimal non-obstructive CAD CAD-RADS 2 25-49% (Mild stenosis) Mild non-obstructive CAD CAD-RADS 3 50-69% (Moderate stenosis) Moderate stenosis CAD-RADS 4 A - 70-99% stenosis or B - Left main >/= 50% or 3-vessel obstructive (>/=70%) disease Severe stenosis CAD-RADS 5 100% (total occlusion) Total coronary occlusion or sub-total occlusion CAD-RADS N Non-diagnostic study Obstructive CAD cannot be excluded  Noncoronary Cardiac Findings: Cardiac valves: Aortic valve is trileaflet. There is no thickening or calcification in the aortic and mitral valves. Pericardium: The pericardial contour is preserved with no effusion, thickening, or calcifications. Left ventricle:  Calculated LVEF 43% Non-cardiac findings reported separately by radiology.     Impression: Moderate CAD involving OM1 branch of the left circumflex (CAD RADS 3).  Mild to moderate disease noted in the mid LAD and nondominant RCA. Location and degree of coronary artery disease does not account for  patient's LV systolic dysfunction Severe calcified plaque burden based on  No non-atherosclerotic coronary abnormalities Mildly reduced LV systolic function (calculated LVEF 43%) Please refer to the radiology report for information on non-cardiac structures.   RECOMMENDATION: Medical therapy for CAD should include aspirin, statin GDMT for HFrEF      CT Angiogram Coronary    Result Date: 9/30/2024  CT ANGIOGRAM CORONARY Date of Exam: 9/30/2024 4:33 PM EDT Indication: . Hypertension diabetes Comparison: CT pulmonary angiogram 9/28/2024 Technique: Non-contrasted CT images of the chest were performed for calcium scoring purposes followed by CTA images of the chest after the uneventful intravenous administration of 64 mL Isovue-370. Reconstructed coronal and sagittal images were also obtained. In addition, a 3-D volume rendered image was created for interpretation. Automated exposure control and iterative reconstruction methods were used. Findings: Hilum and Mediastinum: There is a right hilar lymph node measuring 2.1 x 2.2 cm. There is a left hilar mass encasing left upper lung pulmonary arteries abutting the fissure and extending to the pleura measuring 9.9 x 8.8 cm with heterogeneous enhancement. There are suspected postobstructive changes in the left upper lung.. Lung Parenchyma and Pleura: There is additional consolidation at the left lung base. There is bibasilar compressive atelectasis with mild right and moderate left pleural effusion. Upper Abdomen: No acute process. Soft tissues: Unremarkable. Osseous structures: No aggressive focal lytic or sclerotic osseous lesions.     Impression: Impression: 1. Left hilar mass measuring up to 9.9 cm in size partially encasing left upper lung pulmonary vasculature with suspected postobstructive changes in the left upper and left lower lung. Findings are compatible with neoplasm. 2. Bilateral pleural effusions left greater than right. Please refer to CT Angiogram  Coronary   Cardiology Interp report for information on cardiac structures. Electronically Signed: My Jolley MD  9/30/2024 5:13 PM EDT  Workstation ID: [id number]     Results for orders placed during the hospital encounter of 09/27/24    Adult Transthoracic Echo Complete W/ Cont if Necessary Per Protocol    Interpretation Summary    Left ventricular ejection fraction appears to be less than 20%.    The left ventricular cavity is mild to moderately dilated.    The left atrial cavity is mildly dilated.    Estimated right ventricular systolic pressure from tricuspid regurgitation is mildly elevated (38 mmHg).    There is a left pleural effusion. There is a right pleural effusion.      Current medications:  Scheduled Meds:aspirin, 81 mg, Oral, Daily  atorvastatin, 40 mg, Oral, Nightly  cefTRIAXone, 1,000 mg, Intravenous, Q24H  doxycycline, 100 mg, Oral, Q12H  [Held by provider] empagliflozin, 10 mg, Oral, Daily  enoxaparin, 40 mg, Subcutaneous, Daily  guaiFENesin, 1,200 mg, Oral, Q12H  [Held by provider] insulin glargine, 10 Units, Subcutaneous, Q12H  insulin lispro, 2-9 Units, Subcutaneous, 4x Daily AC & at Bedtime  ipratropium-albuterol, 3 mL, Nebulization, 4x Daily - RT  [Held by provider] losartan, 25 mg, Oral, Daily  metoprolol tartrate, 25 mg, Oral, Q8H  midodrine, 5 mg, Oral, TID AC  pantoprazole, 40 mg, Oral, Q AM  sodium chloride, 10 mL, Intravenous, Q12H      Continuous Infusions:     PRN Meds:.  acetaminophen **OR** acetaminophen **OR** acetaminophen    senna-docusate sodium **AND** polyethylene glycol **AND** bisacodyl **AND** bisacodyl    Calcium Replacement - Follow Nurse / BPA Driven Protocol    cyclobenzaprine    dextrose    dextrose    glucagon (human recombinant)    Magnesium Standard Dose Replacement - Follow Nurse / BPA Driven Protocol    ondansetron ODT **OR** ondansetron    Phosphorus Replacement - Follow Nurse / BPA Driven Protocol    Potassium Replacement - Follow Nurse / BPA Driven  "Protocol    sodium chloride    sodium chloride    sodium chloride    Assessment & Plan   Assessment & Plan     Active Hospital Problems    Diagnosis  POA    **Mass of upper lobe of left lung [R91.8]  Yes    Coronary artery disease involving native coronary artery of native heart without angina pectoris [I25.10]  Yes    HFrEF (heart failure with reduced ejection fraction) [I50.20]  Yes    Demand ischemia [I24.89]  Yes    Lactic acid increased [E87.20]  Yes    Type 2 diabetes mellitus with hyperglycemia, without long-term current use of insulin [E11.65]  Yes    Primary hypertension [I10]  Yes    Hyperlipidemia LDL goal <70 [E78.5]  Yes      Resolved Hospital Problems    Diagnosis Date Resolved POA    Pneumonia [J18.9] 09/28/2024 Yes    Acute respiratory failure with hypoxia [J96.01] 09/29/2024 Yes    Sepsis [A41.9] 09/28/2024 Yes        Brief Hospital Course to date:  Pati SIMON is a 64 y.o. female presenting to ED with SOA. Says she started to feel ill about 1 week ago at which time \"felt like she had a cold going on.\" Over the week continued to feel weaker. Developed SOA which hit suddenly late last night prompting her to come to ED. Feels better now after receiving tx in ED. Denies cough, high fevers.    Acute decompensated systolic heart failure, improved   cardiomyopathy likely ischemic  Elevated troponin, likely demand ischemia  Patient with symptoms suggestive of congestive heart failure.  She reported that she has been having worsening shortness of breath and lower extremity swelling x 2 weeks  Bedside point-of-care ultrasound concerning for severely reduced left ventricular function.    Formal echo with severe systolic dysfunction, EF less than 20%  Status post IV Lasix diuresis with improvement of her dyspnea  CT coronary angiogram coronary calcium score 367.3. Moderate CAD involving LAD. CAD also noted in left main, circumflex and RCA. Medical management recommended   GDMT per " cardiology.    Hypotension  In the view of newly diagnosed systolic heart failure  Continue midodrine  Will be limiting factor to escalate systolic heart failure goal-directed therapy    Newly diagnosed left lung neoplasm  CTA chest showed left upper lung neoplasm concerning for malignancy  Pulmonary consulted. Plan for CT-guided biopsy.   Okay to proceed with lung biopsy per cardiology  Will need oncology referral upon discharge  Lack of insurance will be a challenge.  Case management consulted    Possible community-acquired pneumonia   Leukocytosis   Even though the patient does not have any convincing signs of pneumonia, she does have leukocytosis and some infiltrates in the left upper lung lobe  Continue IV Rocephin and doxycycline and follow cultures  Continue DuoNebs      Uncontrolled DM w/ hyperglycemia with episodes of hypoglycemia  A1c 7.9%  Episodes of hypoglycemia. Hold long-acting insulin.  Hold Jardiance.  Okay to continue sliding scale       Essential hypertension  Dyslipidemia  Blood pressure is borderline.  Holding home blood pressure medications: Amlodipine and losartan    Expected Discharge Location and Transportation: D  Expected Discharge   Expected Discharge Date: 10/1/2024; Expected Discharge Time:      VTE Prophylaxis:  Pharmacologic VTE prophylaxis orders are present.         AM-PAC 6 Clicks Score (PT): 23 (09/30/24 0800)    CODE STATUS:   Code Status and Medical Interventions: CPR (Attempt to Resuscitate); Full Support   Ordered at: 09/27/24 0713     Code Status (Patient has no pulse and is not breathing):    CPR (Attempt to Resuscitate)     Medical Interventions (Patient has pulse or is breathing):    Full Support     Copied text in this note has been reviewed and is accurate as of 10/01/24.     Romana Whiting MD  10/01/24

## 2024-10-01 NOTE — PROGRESS NOTES
"Cardiology Progress Note      Reason for visit:    Systolic heart failure  CAD    IDENTIFICATION: 64-year-old female who resides in [place], [place]    Active Hospital Problems    Diagnosis  POA    **Mass of upper lobe of left lung [R91.8]  Yes     Priority: High    HFrEF (heart failure with reduced ejection fraction) [I50.20]  Yes     Priority: High     Echo (9/20/2024): LVEF 20%.  Mildly dilated left atrium.  Mild MR.  CT coronary angiogram (9/30/2024): Mild to moderate CAD.  CAD burden does not account for degree of LV dysfunction.      Elevated troponin [R79.89]  Yes     Priority: High    Type 2 diabetes mellitus with hyperglycemia, without long-term current use of insulin [E11.65]  Yes     Priority: High    Coronary artery disease involving native coronary artery of native heart without angina pectoris [I25.10]  Yes     Priority: Medium     CT coronary angiogram (9/30/2024): Mild to moderate CAD.  CAD burden does not account for degree of LV dysfunction.      Lactic acid increased [E87.20]  Yes     Priority: Medium    Primary hypertension [I10]  Yes     Priority: Medium    Hyperlipidemia LDL goal <70 [E78.5]  Yes     Priority: Low            Patient had CT coronary angiogram yesterday which showed mild to moderate CAD.  Nothing to account for patient's reduced LVEF.  She denies any chest pain.  She is currently breathing easy on O2 at 2 L by nasal cannula.  Patient refused Lipitor last evening reporting she is \"allergic\"             Vital Sign Min/Max for last 24 hours  Temp  Min: 97.4 °F (36.3 °C)  Max: 97.9 °F (36.6 °C)   BP  Min: 98/62  Max: 127/62   Pulse  Min: 56  Max: 106   Resp  Min: 16  Max: 20   SpO2  Min: 91 %  Max: 99 %   Flow (L/min)  Min: 2  Max: 2    No intake or output data in the 24 hours ending 10/01/24 0915        Physical Exam  Constitutional:       General: She is awake.   Cardiovascular:      Rate and Rhythm: Normal rate and regular rhythm.      Heart sounds: No murmur heard.  Pulmonary: "      Effort: Pulmonary effort is normal.      Breath sounds: Decreased breath sounds present.   Musculoskeletal:      Right lower le+ Pitting No edema.      Left lower le+ Pitting No edema.   Skin:     General: Skin is warm and dry.   Neurological:      Mental Status: She is alert and oriented to person, place, and time.   Psychiatric:         Behavior: Behavior is cooperative.         Tele: Sinus rhythm sinus tachycardia    Results Review (reviewed the patient's recent labs in the electronic medical record):     EKG (2024): Sinus tachycardia with frequent PVCs    CT pulmonary/coronary angiogram (2024): Left hilar mass 9.9 cm in size encasing left upper lung.  Pulmonary vascular with suspected postobstructive changes in left upper and left lower lung compatible with neoplasm.  Bilateral pleural effusions left greater than right.    Coronary CT angiogram (2024): Coronary portion shows coronary calcium score of 367.3.  Moderate CAD involving LAD.  Mildly reduced LVEF 43%.    ECHO (2024): LVEF less than 20%.  Bilateral pleural effusions.  RVSP 38 mmHg    Results from last 7 days   Lab Units 10/01/24  0445 09/30/24  0447 24  0548 24  0401 24  0523   SODIUM mmol/L 133* 138 136 131* 131*   POTASSIUM mmol/L 5.5* 4.7 5.1 4.5 4.1   CHLORIDE mmol/L 95* 97* 97* 94* 89*   BUN mg/dL 15 21 20 20 10   CREATININE mg/dL 0.58 0.55* 0.63 0.52* 0.52*   MAGNESIUM mg/dL 2.0 1.7 1.8  --   --      Results from last 7 days   Lab Units 24  0523   HSTROP T ng/L 36*     Results from last 7 days   Lab Units 10/01/24  0445 09/30/24  0447 24  0548   WBC 10*3/mm3 12.39* 14.35* 16.77*   HEMOGLOBIN g/dL 11.1* 10.7* 10.4*   HEMATOCRIT % 36.3 35.4 34.1   PLATELETS 10*3/mm3 339 420 428       Lab Results   Component Value Date    HGBA1C 12.10 (H) 2024       Lab Results   Component Value Date    CHOL 155 2024    TRIG 74 2024    HDL 44 2024    LDL 93 2024               Systolic heart failure  Echo shows LVEF less than 20%  Metoprolol tartrate 25 mg every 8 hours.  Prefer tartrate over succinate due to hypotension  Losartan on hold due to soft blood pressures requiring midodrine  Presently appears euvolemic      Coronary artery disease  CT coronary angiogram coronary calcium score 367.3.  Moderate CAD involving LAD. CAD also noted in left main, circumflex and RCA.  Medical management recommended  Aspirin and statin  No chest pain      Hypotension  BP currently stable 114/63  Midodrine 5 mg 3 times daily  Losartan on hold  Is tolerating metoprolol tartrate 25 mg every 8 hours        Hyperlipidemia  Started on Lipitor 40 mg daily this admission  Total cholesterol 146, triglycerides 39, HDL 44, LDL 93      Type 2 diabetes mellitus  Uncontrolled hemoglobin A1c 12  Jardiance on hold        Left lung mass concerning for invasive cancer  Management per hospitalistist           Medical management for CAD.  From cardiac standpoint patient is of acceptable cardiovascular risk to proceed with lung biopsy  If patient unable to take statins would recommend a P2 Y12 inhibitor at discharge  Continue aspirin  Low blood pressure requiring midodrine complicating initiation of guideline directed medical therapy.  Will continue metoprolol at every 8 hour dosing  Continue to hold Jardiance and losartan    Electronically signed by WOLF Rhodes, 10/01/24, 7:54 AM EDT.

## 2024-10-01 NOTE — PLAN OF CARE
Goal Outcome Evaluation:               Pt A&Ox4. VSS. Currently in the bed at the lowest position, call light in reach. Family at the bedside, aware of PT condition. POC ongoing.

## 2024-10-02 ENCOUNTER — APPOINTMENT (OUTPATIENT)
Dept: CT IMAGING | Facility: HOSPITAL | Age: 64
DRG: 871 | End: 2024-10-02
Payer: MEDICAID

## 2024-10-02 ENCOUNTER — ANESTHESIA EVENT (OUTPATIENT)
Dept: GASTROENTEROLOGY | Facility: HOSPITAL | Age: 64
End: 2024-10-02
Payer: MEDICAID

## 2024-10-02 PROBLEM — I42.8 NICM (NONISCHEMIC CARDIOMYOPATHY): Status: ACTIVE | Noted: 2024-10-02

## 2024-10-02 PROBLEM — E87.20 LACTIC ACID INCREASED: Status: RESOLVED | Noted: 2024-09-27 | Resolved: 2024-10-02

## 2024-10-02 LAB
ANION GAP SERPL CALCULATED.3IONS-SCNC: 7 MMOL/L (ref 5–15)
BACTERIA SPEC AEROBE CULT: NORMAL
BACTERIA SPEC AEROBE CULT: NORMAL
BUN SERPL-MCNC: 16 MG/DL (ref 8–23)
BUN/CREAT SERPL: 25 (ref 7–25)
CALCIUM SPEC-SCNC: 8.6 MG/DL (ref 8.6–10.5)
CHLORIDE SERPL-SCNC: 94 MMOL/L (ref 98–107)
CO2 SERPL-SCNC: 33 MMOL/L (ref 22–29)
CREAT SERPL-MCNC: 0.64 MG/DL (ref 0.57–1)
EGFRCR SERPLBLD CKD-EPI 2021: 99.4 ML/MIN/1.73
GLUCOSE BLDC GLUCOMTR-MCNC: 195 MG/DL (ref 70–130)
GLUCOSE BLDC GLUCOMTR-MCNC: 271 MG/DL (ref 70–130)
GLUCOSE BLDC GLUCOMTR-MCNC: 311 MG/DL (ref 70–130)
GLUCOSE BLDC GLUCOMTR-MCNC: 371 MG/DL (ref 70–130)
GLUCOSE SERPL-MCNC: 207 MG/DL (ref 65–99)
POTASSIUM SERPL-SCNC: 4.5 MMOL/L (ref 3.5–5.2)
QT INTERVAL: 322 MS
QTC INTERVAL: 340 MS
SODIUM SERPL-SCNC: 134 MMOL/L (ref 136–145)

## 2024-10-02 PROCEDURE — 94799 UNLISTED PULMONARY SVC/PX: CPT

## 2024-10-02 PROCEDURE — 99222 1ST HOSP IP/OBS MODERATE 55: CPT | Performed by: INTERNAL MEDICINE

## 2024-10-02 PROCEDURE — 63710000001 INSULIN LISPRO (HUMAN) PER 5 UNITS: Performed by: INTERNAL MEDICINE

## 2024-10-02 PROCEDURE — 0 DIATRIZOATE MEGLUMINE & SODIUM PER 1 ML: Performed by: HOSPITALIST

## 2024-10-02 PROCEDURE — 93010 ELECTROCARDIOGRAM REPORT: CPT | Performed by: INTERNAL MEDICINE

## 2024-10-02 PROCEDURE — 25010000002 ENOXAPARIN PER 10 MG: Performed by: INTERNAL MEDICINE

## 2024-10-02 PROCEDURE — 63710000001 INSULIN GLARGINE PER 5 UNITS: Performed by: HOSPITALIST

## 2024-10-02 PROCEDURE — 80048 BASIC METABOLIC PNL TOTAL CA: CPT | Performed by: HOSPITALIST

## 2024-10-02 PROCEDURE — 93005 ELECTROCARDIOGRAM TRACING: CPT | Performed by: HOSPITALIST

## 2024-10-02 PROCEDURE — 82948 REAGENT STRIP/BLOOD GLUCOSE: CPT

## 2024-10-02 PROCEDURE — 94664 DEMO&/EVAL PT USE INHALER: CPT

## 2024-10-02 PROCEDURE — 94761 N-INVAS EAR/PLS OXIMETRY MLT: CPT

## 2024-10-02 PROCEDURE — 99232 SBSQ HOSP IP/OBS MODERATE 35: CPT | Performed by: INTERNAL MEDICINE

## 2024-10-02 PROCEDURE — 25510000001 IOPAMIDOL 61 % SOLUTION: Performed by: HOSPITALIST

## 2024-10-02 PROCEDURE — 74177 CT ABD & PELVIS W/CONTRAST: CPT

## 2024-10-02 PROCEDURE — 25010000002 FUROSEMIDE PER 20 MG: Performed by: NURSE PRACTITIONER

## 2024-10-02 RX ORDER — IOPAMIDOL 612 MG/ML
85 INJECTION, SOLUTION INTRAVASCULAR
Status: COMPLETED | OUTPATIENT
Start: 2024-10-02 | End: 2024-10-02

## 2024-10-02 RX ORDER — FUROSEMIDE 10 MG/ML
20 INJECTION INTRAMUSCULAR; INTRAVENOUS EVERY 12 HOURS
Status: DISCONTINUED | OUTPATIENT
Start: 2024-10-02 | End: 2024-10-04

## 2024-10-02 RX ORDER — CARVEDILOL 3.12 MG/1
3.12 TABLET ORAL ONCE
Status: COMPLETED | OUTPATIENT
Start: 2024-10-02 | End: 2024-10-02

## 2024-10-02 RX ORDER — CARVEDILOL 6.25 MG/1
6.25 TABLET ORAL 2 TIMES DAILY WITH MEALS
Status: DISCONTINUED | OUTPATIENT
Start: 2024-10-02 | End: 2024-10-02

## 2024-10-02 RX ORDER — DIATRIZOATE MEGLUMINE AND DIATRIZOATE SODIUM 660; 100 MG/ML; MG/ML
15 SOLUTION ORAL; RECTAL ONCE
Status: COMPLETED | OUTPATIENT
Start: 2024-10-02 | End: 2024-10-02

## 2024-10-02 RX ORDER — CARVEDILOL 3.12 MG/1
3.12 TABLET ORAL 2 TIMES DAILY WITH MEALS
Status: DISCONTINUED | OUTPATIENT
Start: 2024-10-02 | End: 2024-10-10 | Stop reason: HOSPADM

## 2024-10-02 RX ADMIN — FUROSEMIDE 20 MG: 10 INJECTION, SOLUTION INTRAMUSCULAR; INTRAVENOUS at 11:51

## 2024-10-02 RX ADMIN — METOPROLOL TARTRATE 25 MG: 25 TABLET, FILM COATED ORAL at 09:05

## 2024-10-02 RX ADMIN — IPRATROPIUM BROMIDE AND ALBUTEROL SULFATE 3 ML: 2.5; .5 SOLUTION RESPIRATORY (INHALATION) at 07:38

## 2024-10-02 RX ADMIN — DIATRIZOATE MEGLUMINE AND DIATRIZOATE SODIUM 15 ML: 660; 100 LIQUID ORAL; RECTAL at 11:43

## 2024-10-02 RX ADMIN — INSULIN GLARGINE 10 UNITS: 100 INJECTION, SOLUTION SUBCUTANEOUS at 21:25

## 2024-10-02 RX ADMIN — IPRATROPIUM BROMIDE AND ALBUTEROL SULFATE 3 ML: 2.5; .5 SOLUTION RESPIRATORY (INHALATION) at 19:35

## 2024-10-02 RX ADMIN — IPRATROPIUM BROMIDE AND ALBUTEROL SULFATE 3 ML: 2.5; .5 SOLUTION RESPIRATORY (INHALATION) at 14:23

## 2024-10-02 RX ADMIN — INSULIN LISPRO 7 UNITS: 100 INJECTION, SOLUTION INTRAVENOUS; SUBCUTANEOUS at 12:33

## 2024-10-02 RX ADMIN — Medication 10 ML: at 21:26

## 2024-10-02 RX ADMIN — METOPROLOL TARTRATE 25 MG: 25 TABLET, FILM COATED ORAL at 02:01

## 2024-10-02 RX ADMIN — GUAIFENESIN 1200 MG: 600 TABLET, EXTENDED RELEASE ORAL at 09:05

## 2024-10-02 RX ADMIN — GUAIFENESIN 1200 MG: 600 TABLET, EXTENDED RELEASE ORAL at 21:25

## 2024-10-02 RX ADMIN — FUROSEMIDE 20 MG: 10 INJECTION, SOLUTION INTRAMUSCULAR; INTRAVENOUS at 23:32

## 2024-10-02 RX ADMIN — IOPAMIDOL 85 ML: 612 INJECTION, SOLUTION INTRAVENOUS at 13:50

## 2024-10-02 RX ADMIN — DOXYCYCLINE 100 MG: 100 CAPSULE ORAL at 09:05

## 2024-10-02 RX ADMIN — INSULIN LISPRO 8 UNITS: 100 INJECTION, SOLUTION INTRAVENOUS; SUBCUTANEOUS at 17:33

## 2024-10-02 RX ADMIN — INSULIN LISPRO 2 UNITS: 100 INJECTION, SOLUTION INTRAVENOUS; SUBCUTANEOUS at 09:05

## 2024-10-02 RX ADMIN — INSULIN LISPRO 6 UNITS: 100 INJECTION, SOLUTION INTRAVENOUS; SUBCUTANEOUS at 21:25

## 2024-10-02 RX ADMIN — Medication 10 ML: at 09:06

## 2024-10-02 RX ADMIN — CARVEDILOL 3.12 MG: 3.12 TABLET, FILM COATED ORAL at 12:54

## 2024-10-02 RX ADMIN — ASPIRIN 81 MG: 81 TABLET, COATED ORAL at 09:05

## 2024-10-02 RX ADMIN — MIDODRINE HYDROCHLORIDE 5 MG: 5 TABLET ORAL at 11:51

## 2024-10-02 RX ADMIN — PANTOPRAZOLE SODIUM 40 MG: 40 TABLET, DELAYED RELEASE ORAL at 06:35

## 2024-10-02 RX ADMIN — MIDODRINE HYDROCHLORIDE 5 MG: 5 TABLET ORAL at 17:35

## 2024-10-02 RX ADMIN — ENOXAPARIN SODIUM 40 MG: 100 INJECTION SUBCUTANEOUS at 09:04

## 2024-10-02 NOTE — PROGRESS NOTES
Cardiology Progress Note      Reason for visit:    Acute systolic heart failure  Coronary artery disease    IDENTIFICATION: 64-year-old female who resides in [place], [place]    Active Hospital Problems    Diagnosis  POA    **Mass of upper lobe of left lung [R91.8]  Yes     Priority: High    HFrEF (heart failure with reduced ejection fraction) [I50.20]  Yes     Priority: Medium     Echo (9/20/2024): LVEF 20%.  Mildly dilated left atrium.  Mild MR.  CT coronary angiogram (9/30/2024): Mild to moderate CAD.  CAD burden does not account for degree of LV dysfunction.      NICM (nonischemic cardiomyopathy) [I42.8]  Yes     Echo (9/20/2024): LVEF 20%  Coronary angiogram (9/30/2024): Mild to moderate CAD.  CAD burden does not account for degree of LV dysfunction      Coronary artery disease involving native coronary artery of native heart without angina pectoris [I25.10]  Yes     CT coronary angiogram (9/30/2024): Mild to moderate CAD.  CAD burden does not account for degree of LV dysfunction.      Type 2 diabetes mellitus with hyperglycemia, without long-term current use of insulin [E11.65]  Yes    Hyperlipidemia LDL goal <70 [E78.5]  Yes     Statin therapy indicated due to the presence of CAD  Intolerant to atorvastatin and rosuvastatin              Patient laying flat in bed breathing easily with 2 L supplemental oxygen           Vital Sign Min/Max for last 24 hours  Temp  Min: 97.7 °F (36.5 °C)  Max: 98.3 °F (36.8 °C)   BP  Min: 101/81  Max: 131/65   Pulse  Min: 62  Max: 74   Resp  Min: 16  Max: 18   SpO2  Min: 90 %  Max: 98 %   Flow (L/min)  Min: 2  Max: 2    No intake or output data in the 24 hours ending 10/02/24 1525        Physical Exam  Constitutional:       General: She is awake.   Cardiovascular:      Rate and Rhythm: Normal rate and regular rhythm.      Heart sounds: No murmur heard.  Pulmonary:      Effort: Pulmonary effort is normal.      Breath sounds: Decreased breath sounds present.   Abdominal:       Palpations: Abdomen is soft.   Musculoskeletal:      Right lower le+ Pitting Edema present.      Left lower le+ Pitting Edema present.   Skin:     General: Skin is warm and dry.   Neurological:      Mental Status: She is alert and oriented to person, place, and time.   Psychiatric:         Behavior: Behavior is cooperative.       Tele: Sinus rhythm sinus tachycardia     Results Review (reviewed the patient's recent labs in the electronic medical record):      EKG (2024): Sinus tachycardia with frequent PVCs     CT pulmonary/coronary angiogram (2024): Left hilar mass 9.9 cm in size encasing left upper lung.  Pulmonary vascular with suspected postobstructive changes in left upper and left lower lung compatible with neoplasm.  Bilateral pleural effusions left greater than right.     Coronary CT angiogram (2024): Coronary portion shows coronary calcium score of 367.3.  Moderate CAD involving LAD.  Mildly reduced LVEF 43%.     ECHO (2024): LVEF less than 20%.  Bilateral pleural effusions.  RVSP 38 mmHg    Results from last 7 days   Lab Units 10/02/24  0404 10/01/24  0445 09/30/24  0447 09/29/24  0548 24  0401 24  0523   SODIUM mmol/L 134* 133* 138 136 131* 131*   POTASSIUM mmol/L 4.5 5.5* 4.7 5.1 4.5 4.1   CHLORIDE mmol/L 94* 95* 97* 97* 94* 89*   BUN mg/dL 16 15 21 20 20 10   CREATININE mg/dL 0.64 0.58 0.55* 0.63 0.52* 0.52*   MAGNESIUM mg/dL  --  2.0 1.7 1.8  --   --      Results from last 7 days   Lab Units 24  0523   HSTROP T ng/L 36*     Results from last 7 days   Lab Units 10/01/24  0445 09/30/24  0447 09/29/24  0548   WBC 10*3/mm3 12.39* 14.35* 16.77*   HEMOGLOBIN g/dL 11.1* 10.7* 10.4*   HEMATOCRIT % 36.3 35.4 34.1   PLATELETS 10*3/mm3 339 420 428       Lab Results   Component Value Date    HGBA1C 12.10 (H) 2024       Lab Results   Component Value Date    CHOL 155 2024    TRIG 74 2024    HDL 44 2024    LDL 93 2024              HFrEF  LVEF  about 20%  CT coronary angiogram shows no obstructive disease of proximal vessels to account for LV dysfunction  Patient appears compensated  Reduce carvedilol to 3.125 mg twice daily  Continue losartan, empagliflozin        Coronary artery disease  No obstructive CAD of proximal vessels  Medical therapy recommended, including antiplatelet, statin, beta-blocker        Hypotension  Improved with midodrine    Hyperlipidemia with target LDL <70  Continue atorvastatin        Type 2 diabetes mellitus not on insulin with hyperglycemia  Continue empagliflozin for HFrEF and DM     Left lung mass concerning for invasive cancer  Management per hospitalistist                Reduce carvedilol to 3.125 mg twice daily  Continue losartan, empagliflozin, carvedilol  Patient appears compensated from CHF standpoint and has no obstructive CAD on CT coronary angiogram  Patient is a increased but acceptable cardiovascular risk to undergo general anesthesia for bronchoscopy if CT-guided biopsy not feasible    Electronically signed by Richie Wiley IV, MD, 10/02/24, 3:25 PM EDT.

## 2024-10-02 NOTE — CONSULTS
HEMATOLOGY/ONCOLOGY INPATIENT CONSULTATION      REFERRING PHYSICIAN: Romana Whiting MD    PRIMARY CARE PROVIDER: No primary care provider on file.    REASON FOR CONSULTATION: Lung mass concerning for malignancy      HISTORY OF PRESENT ILLNESS: 64-year-old lady with history of smoking presents to the hospital for large left upper lobe mass.  Pulmonary has been consulted for biopsy.  She has a significant EF of 20%.  She is scheduled for a biopsy soon.  I was asked to see her regarding this possible malignancy.  She reports only feeling bad for the last couple of weeks.  Prior to that she was doing reasonably well.  She lives here in town.  She just lost her job as a .      Allergies   Allergen Reactions    Codeine Irritability     jittering    Lipitor [Atorvastatin] Myalgia    Rosuvastatin Nausea Only       Past Medical History:   Diagnosis Date    Diabetes mellitus     GERD (gastroesophageal reflux disease)     Hypertension     Pneumonia     Urinary tract infection     Visual impairment          Current Facility-Administered Medications:     acetaminophen (TYLENOL) tablet 650 mg, 650 mg, Oral, Q4H PRN **OR** acetaminophen (TYLENOL) 160 MG/5ML oral solution 650 mg, 650 mg, Oral, Q4H PRN **OR** acetaminophen (TYLENOL) suppository 650 mg, 650 mg, Rectal, Q4H PRN, AntoniYasmeen M II, DO    aspirin EC tablet 81 mg, 81 mg, Oral, Daily, Yasmeen Corrales M II, DO, 81 mg at 10/02/24 0905    sennosides-docusate (PERICOLACE) 8.6-50 MG per tablet 2 tablet, 2 tablet, Oral, BID PRN **AND** polyethylene glycol (MIRALAX) packet 17 g, 17 g, Oral, Daily PRN **AND** bisacodyl (DULCOLAX) EC tablet 5 mg, 5 mg, Oral, Daily PRN **AND** bisacodyl (DULCOLAX) suppository 10 mg, 10 mg, Rectal, Daily PRN, Yasmeen Corraels M II, DO    Calcium Replacement - Follow Nurse / BPA Driven Protocol, , Does not apply, PRN, Antoni, Yasmeen M II, DO    carvedilol (COREG) tablet 3.125 mg, 3.125 mg, Oral, BID With Meals, Cony Gomez APRN     cyclobenzaprine (FLEXERIL) tablet 5 mg, 5 mg, Oral, TID PRN, Yasmeen Corrales II, DO    dextrose (D50W) (25 g/50 mL) IV injection 25 g, 25 g, Intravenous, Q15 Min PRN, Yasmeen Corrales II, DO    dextrose (GLUTOSE) oral gel 15 g, 15 g, Oral, Q15 Min PRN, Yasmeen Corrales II, DO    empagliflozin (JARDIANCE) tablet 10 mg, 10 mg, Oral, Daily, Richie Wiley IV, MD, 10 mg at 09/29/24 0837    Enoxaparin Sodium (LOVENOX) syringe 40 mg, 40 mg, Subcutaneous, Daily, Yasmeen Corrales II, DO, 40 mg at 10/02/24 0904    ezetimibe (ZETIA) tablet 10 mg -- **PATIENT SUPPLY**, 10 mg, Oral, Daily, Romana Whiting MD    furosemide (LASIX) injection 20 mg, 20 mg, Intravenous, Q12H, Cony Gomez APRN, 20 mg at 10/02/24 1151    glucagon (GLUCAGEN) injection 1 mg, 1 mg, Intramuscular, Q15 Min PRN, Yasmeen Corrales II, DO    guaiFENesin (MUCINEX) 12 hr tablet 1,200 mg, 1,200 mg, Oral, Q12H, Jose L Davis MD, 1,200 mg at 10/02/24 0905    insulin glargine (LANTUS, SEMGLEE) injection 10 Units, 10 Units, Subcutaneous, Q12H, Romana Whiting MD, 10 Units at 09/29/24 2237    Insulin Lispro (humaLOG) injection 2-9 Units, 2-9 Units, Subcutaneous, 4x Daily AC & at Bedtime, Yasmeen Corrales II, DO, 7 Units at 10/02/24 1233    ipratropium-albuterol (DUO-NEB) nebulizer solution 3 mL, 3 mL, Nebulization, 4x Daily - RT, Yasmeen Corrales II, DO, 3 mL at 10/02/24 1423    losartan (COZAAR) tablet 25 mg, 25 mg, Oral, Daily, Romana Whiting MD, 25 mg at 09/27/24 1135    Magnesium Standard Dose Replacement - Follow Nurse / BPA Driven Protocol, , Does not apply, PRN, Yasmeen Corrales II, DO    midodrine (PROAMATINE) tablet 5 mg, 5 mg, Oral, TID AC, Jose L Davis MD, 5 mg at 10/02/24 1151    ondansetron ODT (ZOFRAN-ODT) disintegrating tablet 4 mg, 4 mg, Oral, Q6H PRN **OR** ondansetron (ZOFRAN) injection 4 mg, 4 mg, Intravenous, Q6H PRN, Yasmeen Corrales II, DO    pantoprazole (PROTONIX) EC tablet 40 mg, 40 mg, Oral, Q AM,  Antoni, Yasmeen M II, DO, 40 mg at 10/02/24 0635    Phosphorus Replacement - Follow Nurse / BPA Driven Protocol, , Does not apply, PRN, Antoni, Yasmeen M II, DO    Potassium Replacement - Follow Nurse / BPA Driven Protocol, , Does not apply, PRN, Antoni, Yasmeen M II, DO    sodium chloride 0.9 % flush 10 mL, 10 mL, Intravenous, PRN, Chente Moore MD    sodium chloride 0.9 % flush 10 mL, 10 mL, Intravenous, Q12H, Antoni, Yasmeen M II, DO, 10 mL at 10/02/24 0906    sodium chloride 0.9 % flush 10 mL, 10 mL, Intravenous, PRN, Antoni, Yasmeen M II, DO    sodium chloride 0.9 % infusion 40 mL, 40 mL, Intravenous, PRN, Antoni, Yasmeen M II, DO    Past Surgical History:   Procedure Laterality Date     SECTION      4       Social History     Socioeconomic History    Marital status:    Tobacco Use    Smoking status: Every Day     Current packs/day: 1.00     Average packs/day: 1 pack/day for 42.8 years (42.8 ttl pk-yrs)     Types: Cigarettes     Start date: 1982     Passive exposure: Never    Smokeless tobacco: Never   Vaping Use    Vaping status: Never Used   Substance and Sexual Activity    Alcohol use: Not Currently     Alcohol/week: 1.0 standard drink of alcohol     Types: 1 Glasses of wine per week     Comment: rare    Drug use: Not Currently     Types: Marijuana     Comment: occ    Sexual activity: Not Currently     Partners: Male     Birth control/protection: Post-menopausal       Family History   Problem Relation Age of Onset    Diabetes Mother     Heart attack Mother 56    Diabetes Father     Heart attack Father     Diabetes Sister     Suicidality Maternal Grandmother     Heart attack Maternal Grandfather     Stroke Paternal Grandmother     Diabetes Paternal Grandmother     Alcohol abuse Paternal Grandfather     Heart disease Paternal Grandfather        Oncology/Hematology History    No history exists.         REVIEW OF SYSTEMS:  A 14 point review of systems was performed and is negative except as  noted below.    Review of Systems   Constitutional:  Positive for appetite change and unexpected weight change.   HENT:  Negative.     Eyes: Negative.    Respiratory:  Positive for shortness of breath.    Cardiovascular: Negative.    Gastrointestinal: Negative.    Endocrine: Negative.    Genitourinary: Negative.     Musculoskeletal: Negative.    Skin: Negative.    Neurological: Negative.    Psychiatric/Behavioral: Negative.           Objective     Vitals:    10/02/24 1135 10/02/24 1151 10/02/24 1254 10/02/24 1423   BP: 126/66 122/66 117/60    BP Location: Left arm      Patient Position: Lying      Pulse: 68  71 67   Resp: 18   18   Temp: 98.3 °F (36.8 °C)      TempSrc: Oral      SpO2: 95%   95%   Weight:       Height:                       Temp:  [97.7 °F (36.5 °C)-98.3 °F (36.8 °C)] 98.3 °F (36.8 °C)     Performance Status: 1    Physical Exam    General: Frail appearing female in no acute distress  HEENT: sclerae anicteric, oropharynx clear  Lymphatics: no cervical, supraclavicular, or axillary adenopathy  Cardiovascular: regular rate and rhythm, no murmurs  Abdomen: soft, nontender, nondistended.  No palpable organomegaly  Extremities: no lower extremity edema  Skin: no rashes, lesions, bruising, or petechiae      LABS:    Lab Results   Component Value Date    HGB 11.1 (L) 10/01/2024    HCT 36.3 10/01/2024    MCV 81.9 10/01/2024     10/01/2024    WBC 12.39 (H) 10/01/2024    NEUTROABS 9.21 (H) 10/01/2024    LYMPHSABS 1.35 10/01/2024    MONOSABS 1.72 (H) 10/01/2024    EOSABS 0.04 10/01/2024    BASOSABS 0.02 10/01/2024     Lab Results   Component Value Date    GLUCOSE 207 (H) 10/02/2024    BUN 16 10/02/2024    CREATININE 0.64 10/02/2024     (L) 10/02/2024    K 4.5 10/02/2024    CL 94 (L) 10/02/2024    CO2 33.0 (H) 10/02/2024    CALCIUM 8.6 10/02/2024    PROTEINTOT 5.1 (L) 10/01/2024    ALBUMIN 2.8 (L) 10/01/2024    BILITOT 0.2 10/01/2024    ALKPHOS 117 10/01/2024    AST 14 10/01/2024    ALT 16 10/01/2024          IMAGING    CT Abdomen Pelvis With Contrast    Result Date: 10/2/2024  CT ABDOMEN PELVIS W CONTRAST Date of Exam: 10/2/2024 1:38 PM EDT Indication: Lung mass concerning for malignancy.  Staging/looking for amenable biopsy if found. Comparison: None available. Technique: Axial CT images were obtained of the abdomen and pelvis following the uneventful intravenous administration of 85 mL Isovue-300. Reconstructed coronal and sagittal images were also obtained. Automated exposure control and iterative construction methods were used. Findings: Liver: The liver is unremarkable in morphology. No focal liver lesion is seen. No biliary dilation is seen. Gallbladder: Tiny calcified gallstone. Gallbladder is otherwise unremarkable Pancreas: Unremarkable. Spleen: Unremarkable. Adrenal glands: Mildly prominent/nodular left adrenal gland versus adjacent prominent celiac ganglion. Genitourinary tract: Multifocal cortical scarring within the right kidney. Kidneys are otherwise unremarkable. No hydronephrosis is seen. Visualized portions of the ureters and urinary bladder appear unremarkable. 4.2 cm complex lesion within the right adnexa containing macroscopic fat, likely an ovarian dermoid. Gastrointestinal tract: Infraumbilical hernia defect contains a loop of nonobstructed transverse colon. Large amount of stool throughout the colon no findings to suggest bowel obstruction. Appendix: The appendix is not identified Other findings: No free air or significant free fluid. No pathologically enlarged lymph nodes are seen. Vascular calcifications are present. The IVC is unremarkable. Bones and soft tissues: No acute osseous lesion is identified. Superficial soft tissue anasarca is present. Just below the umbilicus, there is a ventral hernia defect containing a nonobstructing loop of colon. Slightly inferior to this is an additional ventral hernia defect containing only fat. Small fat-containing umbilical hernia also noted.  Superficial soft tissue anasarca is seen. Lung bases: Partially visualized left pleural effusion with left basilar atelectasis. Please refer to chest CTA from 9/28/2024.     Impression: 1.No acute abnormality identified within the abdomen or pelvis. 2.Mildly prominent/nodular left adrenal gland versus adjacent prominent celiac ganglion. 3.Several ventral abdominal wall hernia defects, 1 of which contains nonobstructed transverse colon. 4.Large amount of stool throughout the colon. 5.4.2 cm complex lesion within the right adnexa containing macroscopic fat, likely an ovarian dermoid. 6.Cholelithiasis. 7.Partially visualized left pleural effusion with left basilar atelectasis. Please refer to chest CTA from 9/28/2024. Electronically Signed: Davide Craig MD  10/2/2024 2:09 PM EDT  Workstation ID: [id number]    CT Angiogram Coronary-Cardiology Interpretation    Result Date: 10/1/2024  Table formatting from the original result was not included. Images from the original result were not included. CORONARY CT ANGIOGRAPHY WITH CALCIUM SCORE CLINICAL HISTORY:  Cardiomyopathy TECHNIQUE: Using a Siemens Force scanner, a preliminary  study was obtained, followed by coronary artery calcium protocol.  0.5 mm collimated images were obtained through the coronary arteries.  Data were transferred offline for 3D reconstructions using [employer]. CONTRAST: 65 mL iopamidol (ISOVUE-370) ACQUISITION: Retrospective ECG triggered acquisition was used.  Heart rate at the time of acquisition was approximately 60 BPM. MEDICATIONS: Nitroglycerin 0.8 mg sublingual tablet TECHNICAL QUALITY: Excellent, with no artifacts FINDINGS: Coronary artery calcification findings: The total calcium score is 367.3 indicating severe (-999) calcified plaque in the coronary tree. Left main 54.2 .9 LCx 176 RCA 21.2 Grading Scale for Plaque [place]: P1 (CAC 1-100) Mild amount of plaque P2 (-300) Moderate amount of plaque P3 (-999)  Severe amount of plaque P4 (CAC > 1000) Extensive amount of plaque Angiographic Findings: The coronary anatomy is left dominant. Left Main: Normal caliber vessel which bifurcates in the LAD and left circumflex coronary artery.  There is mild calcification associated with no stenosis. LAD: Moderate-sized vessel which gives off 1 moderate-sized diagonal branch.  Scattered calcified plaque in the proximal and mid segment.  In the mid LAD distal to the first diagonal bifurcation, there is focal moderate stenosis.  The moderate-sized diagonal branch has a moderate ostial stenosis (CAD RADS 3) LCX: Large dominant vessel which gives off 1 large obtuse marginal branch as well as a left PDA.  There is scattered calcified plaque in the left circumflex.  There is mild to moderate disease of the first obtuse marginal branch.  The left PDA has mild stenosis. RCA: Small nondominant vessel with mild calcified Grading Scale for Stenosis Severity: Category Degree Interpretation CAD-RADS 0 0% (No plaque or stenosis) Absence of CAD CAD-RADS 1 1-24% (Minimal stenosis or plaque w/o stenosis) Minimal non-obstructive CAD CAD-RADS 2 25-49% (Mild stenosis) Mild non-obstructive CAD CAD-RADS 3 50-69% (Moderate stenosis) Moderate stenosis CAD-RADS 4 A - 70-99% stenosis or B - Left main >/= 50% or 3-vessel obstructive (>/=70%) disease Severe stenosis CAD-RADS 5 100% (total occlusion) Total coronary occlusion or sub-total occlusion CAD-RADS N Non-diagnostic study Obstructive CAD cannot be excluded  Noncoronary Cardiac Findings: Cardiac valves: Aortic valve is trileaflet. There is no thickening or calcification in the aortic and mitral valves. Pericardium: The pericardial contour is preserved with no effusion, thickening, or calcifications. Left ventricle:  Calculated LVEF 43% Non-cardiac findings reported separately by radiology.     Moderate CAD involving OM1 branch of the left circumflex (CAD RADS 3).  Mild to moderate disease noted in the mid  LAD and nondominant RCA. Location and degree of coronary artery disease does not account for patient's LV systolic dysfunction Severe calcified plaque burden based on  No non-atherosclerotic coronary abnormalities Mildly reduced LV systolic function (calculated LVEF 43%) Please refer to the radiology report for information on non-cardiac structures.   RECOMMENDATION: Medical therapy for CAD should include aspirin, statin GDMT for HFrEF      CT Angiogram Coronary    Result Date: 9/30/2024  CT ANGIOGRAM CORONARY Date of Exam: 9/30/2024 4:33 PM EDT Indication: . Hypertension diabetes Comparison: CT pulmonary angiogram 9/28/2024 Technique: Non-contrasted CT images of the chest were performed for calcium scoring purposes followed by CTA images of the chest after the uneventful intravenous administration of 64 mL Isovue-370. Reconstructed coronal and sagittal images were also obtained. In addition, a 3-D volume rendered image was created for interpretation. Automated exposure control and iterative reconstruction methods were used. Findings: [place] and Mediastinum: There is a right hilar lymph node measuring 2.1 x 2.2 cm. There is a left hilar mass encasing left upper lung pulmonary arteries abutting the fissure and extending to the pleura measuring 9.9 x 8.8 cm with heterogeneous enhancement. There are suspected postobstructive changes in the left upper lung.. Lung Parenchyma and Pleura: There is additional consolidation at the left lung base. There is bibasilar compressive atelectasis with mild right and moderate left pleural effusion. Upper Abdomen: No acute process. Soft tissues: Unremarkable. Osseous structures: No aggressive focal lytic or sclerotic osseous lesions.     Impression: 1. Left hilar mass measuring up to 9.9 cm in size partially encasing left upper lung pulmonary vasculature with suspected postobstructive changes in the left upper and left lower lung. Findings are compatible with neoplasm. 2.  Bilateral pleural effusions left greater than right. Please refer to CT Angiogram Coronary   Cardiology Interp report for information on cardiac structures. Electronically Signed: My Jolley MD  9/30/2024 5:13 PM EDT  Workstation ID: [id number]    Adult Transthoracic Echo Complete W/ Cont if Necessary Per Protocol    Result Date: 9/28/2024    Left ventricular ejection fraction appears to be less than 20%.   The left ventricular cavity is mild to moderately dilated.   The left atrial cavity is mildly dilated.   Estimated right ventricular systolic pressure from tricuspid regurgitation is mildly elevated (38 mmHg).   There is a left pleural effusion. There is a right pleural effusion.     CT Angiogram Chest Pulmonary Embolism    Result Date: 9/28/2024  CT ANGIOGRAM CHEST PULMONARY EMBOLISM Date of Exam: 9/28/2024 12:02 PM EDT Indication: Pulmonary embolism (PE) suspected, high prob COMPLEX LE PL EFFUSION, R/O PE. Comparison: None available. Technique: Axial CT images were obtained of the chest after the uneventful intravenous administration of intravenous contrast. Utilizing pulmonary embolism protocol.  Reconstructed coronal and sagittal images were also obtained. Automated exposure control and iterative construction methods were used. Findings: The trachea is patent. There is a large consolidative mass in the left upper lobe measuring approximately 10 x 11 cm extending between the visceral and parietal pleura encasing the left hilar structures. There is a soft tissue mass at the left main bronchus lobar bifurcation. There is a small left pleural effusion. There is a 5 mm pulmonary nodule at the anterior right lung base (image 78). Minimal right posterior basilar atelectasis. 3 mm subpleural nodule right lower lobe (image 72). 5 mm nodule right lower lobe (image 69). The right lung is otherwise clear. Pulmonary arteries: Adequate opacification of the pulmonary arteries. No evidence of acute pulmonary embolism.  There is encasement of the lingular segmental and subsegmental branches Borderline cardiomegaly. Thoracic aorta appears within normal limits in size with atherosclerosis. Mediastinal structures not clearly evaluated for lymphadenopathy due to protocol technique. There is mild stranding with edematous appearance of the left axillary region     Impression: 1. Large left upper lobe consolidative mass compatible with neoplasm 2. Small left pleural effusion 3. No evidence of pulmonary embolism Electronically Signed: Panfilo Simon MD  9/28/2024 12:50 PM EDT  Workstation ID: [id number]    XR Chest 1 View    Result Date: 9/27/2024  XR CHEST 1 VW Date of Exam: 9/27/2024 5:00 AM EDT Indication: SOA triage protocol Comparison: None available. Findings: The heart is enlarged. The pulmonary vascular markings are normal. There is dense consolidation involving the lingular segment of the left upper lobe and portions of the left lower lobe. The osseous structures are normal.     Impression: Dense consolidation of the lingular segment of the left upper lobe and portions of the left lower lobe. Electronically Signed: Truman Tejada MD  9/27/2024 5:12 AM EDT  Workstation ID: [id number]      ASSESSMENT/PLAN:    1 Large left-sided lung mass consistent with malignancy.  Unfortunately we will have to wait on biopsy to decide how to manage this.  She is going to need an MRI of her brain to complete her workup.  If this is a small cell cancer then I would recommend keeping her in the hospital and treating her with her 1st cycle.  If it is a non-small cell than she will be discharged to follow-up with me as an outpatient.  I will follow-up with the results of her biopsy soon.  The disease itself is fairly extensive and surgical and prevention at this time is not possible.  She likely has stage IV disease with pleural effusion.      Zarina Simpson MD    10/2/2024

## 2024-10-02 NOTE — CASE MANAGEMENT/SOCIAL WORK
Continued Stay Note  [facility]     Patient Name: Pati VAN  MRN: [id number]  Today's Date: 10/2/2024    Admit Date: 9/27/2024    Plan: Home   Discharge Plan       Row Name 10/02/24 1507       Plan    Plan Home    Patient/Family in Agreement with Plan yes    Plan Comments Ms. Van is not being discharged today. She is to have an MRI of the brain with & without contrast today. Lung biopsy was cancelled for today. CM will continue to follow.    Final Discharge Disposition Code 01 - home or self-care                   Discharge Codes    No documentation.                 Expected Discharge Date and Time       Expected Discharge Date Expected Discharge Time    Oct 4, 2024               Rozina Cosby RN

## 2024-10-02 NOTE — PLAN OF CARE
Goal Outcome Evaluation:      Pt A&Ox4. VSS. 2L NC. No complaints of pain or SOA. Currently in the bed, call light in reach. POC ongoing.

## 2024-10-02 NOTE — PROGRESS NOTES
[facility] Medicine Services  PROGRESS NOTE    Patient Name: Pati SIMON  : 1960  MRN: [id number]    Date of Admission: 2024  Primary Care Physician: No primary care provider on file.    Subjective   Subjective     CC:  For shortness of breath    HPI:  No acute events over the night.  Patient is feeling better.  Coronary CTA reviewed.  Continue medical treatment.  CT-guided biopsy canceled due to not amenable spot per IR.  Pulmonary on board.  Plan for bronchoscopy tomorrow if CT CT abdomen pelvis negative    Objective   Objective     Vital Signs:   Temp:  [97.7 °F (36.5 °C)-98.3 °F (36.8 °C)] 98.3 °F (36.8 °C)  Heart Rate:  [61-74] 74  Resp:  [16-18] 18  BP: (101-131)/(48-98) 131/65  Flow (L/min):  [2] 2     Physical Exam:  General: Comfortable, not in distress, conversant and cooperative  Head: Atraumatic and normocephalic  Eyes: No Icterus. No pallor  Ears:  Ears appear intact with no abnormalities noted  Throat: No oral lesions, no thrush  Neck: Supple, trachea midline  Lungs: Markedly diminished air entry left lung base.  Bilateral wheezing  Heart:  Normal S1 and S2, no murmur, no gallop, No JVD, 3+ lower extremity swelling  Abdomen:  Soft, no tenderness, no organomegaly, normal bowel sounds, no organomegaly  Extremities: pulses equal bilaterally  Skin: No bleeding, bruising or rash, normal skin turgor and elasticity  Neurologic: Cranial nerves appear intact with no evidence of facial asymmetry, normal motor and sensory functions in all 4 extremities  Psych: Alert and oriented x 3, normal mood    Results Reviewed:  LAB RESULTS:      Lab 10/01/24  0445 24  0447 24  0548 24  0401 24  0900 24  0523   WBC 12.39* 14.35* 16.77* 16.19*  --  17.88*   HEMOGLOBIN 11.1* 10.7* 10.4* 10.2*  --  11.7*   HEMATOCRIT 36.3 35.4 34.1 32.9*  --  37.7   PLATELETS 339 420 428 428  --  543*   NEUTROS ABS 9.21* 10.43* 12.79*  --   --  15.22*   IMMATURE GRANS  (ABS) 0.05 0.07* 0.06*  --   --  0.08*   LYMPHS ABS 1.35 1.73 1.96  --   --  1.02   MONOS ABS 1.72* 2.04* 1.92*  --   --  1.50*   EOS ABS 0.04 0.05 0.02  --   --  0.02   MCV 81.9 81.9 81.2 81.0  --  80.0   CRP  --   --   --  4.30*  --   --    PROCALCITONIN  --   --   --  0.19  --   --    LACTATE  --   --   --   --  1.2 2.5*   HSTROP T  --   --   --   --   --  36*         Lab 10/02/24  0404 10/01/24  0445 09/30/24  0447 09/29/24  0548 09/28/24  0401   SODIUM 134* 133* 138 136 131*   POTASSIUM 4.5 5.5* 4.7 5.1 4.5   CHLORIDE 94* 95* 97* 97* 94*   CO2 33.0* 30.0* 35.0* 32.0* 30.0*   ANION GAP 7.0 8.0 6.0 7.0 7.0   BUN 16 15 21 20 20   CREATININE 0.64 0.58 0.55* 0.63 0.52*   EGFR 99.4 101.8 103.1 99.8 104.5   GLUCOSE 207* 102* 61* 144* 143*   CALCIUM 8.6 8.4* 8.8 9.4 9.2   MAGNESIUM  --  2.0 1.7 1.8  --    PHOSPHORUS  --   --  4.1 4.8*  --    HEMOGLOBIN A1C  --   --   --   --  12.10*         Lab 10/01/24  0445 09/30/24  0447 09/29/24  0548 09/27/24  0523   TOTAL PROTEIN 5.1* 5.6* 6.3 7.0   ALBUMIN 2.8* 3.2* 3.2* 3.6   GLOBULIN 2.3 2.4 3.1 3.4   ALT (SGPT) 16 24 32 16   AST (SGOT) 14 14 29 38*   BILIRUBIN 0.2 0.2 <0.2 0.4   ALK PHOS 117 137* 158* 163*         Lab 09/28/24  0401 09/27/24  0523   PROBNP 8,460.0* 6,196.0*   HSTROP T  --  36*         Lab 09/30/24  0447 09/28/24  0401   CHOLESTEROL 155 146   LDL CHOL  --  93   HDL CHOL  --  44   TRIGLYCERIDES 74 39             Lab 09/27/24  0532   PH, ARTERIAL 7.331*   PCO2, ARTERIAL 58.8*   PO2 ART 64.6*   FIO2 36   HCO3 ART 31.0*   BASE EXCESS ART 3.8*   CARBOXYHEMOGLOBIN 5.2*     Brief Urine Lab Results       None            Microbiology Results Abnormal       Procedure Component Value - Date/Time    Blood Culture - Blood, Arm, Right [[id number]]  (Normal) Collected: 09/27/24 0601    Lab Status: Final result Specimen: Blood from Arm, Right Updated: 10/02/24 0645     Blood Culture No growth at 5 days    Narrative:      Less than seven (7) mL's of blood was collected.   Insufficient quantity may yield false negative results.    Blood Culture - Blood, Arm, Left [[id number]]  (Normal) Collected: 09/27/24 0459    Lab Status: Final result Specimen: Blood from Arm, Left Updated: 10/02/24 0645     Blood Culture No growth at 5 days    Narrative:      Less than seven (7) mL's of blood was collected.  Insufficient quantity may yield false negative results.    MRSA Screen, PCR (Inpatient) - Swab, Nares [[id number]]  (Normal) Collected: 09/28/24 1157    Lab Status: Final result Specimen: Swab from Nares Updated: 09/28/24 1413     MRSA PCR Negative    Narrative:      The negative predictive value of this diagnostic test is high and should only be used to consider de-escalating anti-MRSA therapy. A positive result may indicate colonization with MRSA and must be correlated clinically.  MRSA Negative    S. Pneumo Ag Urine or CSF - Urine, Urine, Clean Catch [[id number]]  (Normal) Collected: 09/27/24 1258    Lab Status: Final result Specimen: Urine, Clean Catch Updated: 09/27/24 1909     Strep Pneumo Ag Negative    Legionella Antigen, Urine - Urine, Urine, Clean Catch [[id number]]  (Normal) Collected: 09/27/24 1258    Lab Status: Final result Specimen: Urine, Clean Catch Updated: 09/27/24 1908     LEGIONELLA ANTIGEN, URINE Negative    Narrative:      2025-09-26    Respiratory Panel PCR w/COVID-19(SARS-CoV-2) ARNAV/NAIMA/AUNG/PAD/COR/JE In-House, NP Swab in UTM/VTM, 2 HR TAT - Swab, Nasopharynx [[id number]]  (Normal) Collected: 09/27/24 0524    Lab Status: Final result Specimen: Swab from Nasopharynx Updated: 09/27/24 0621     ADENOVIRUS, PCR Not Detected     Coronavirus 229E Not Detected     Coronavirus HKU1 Not Detected     Coronavirus NL63 Not Detected     Coronavirus OC43 Not Detected     COVID19 Not Detected     Human Metapneumovirus Not Detected     Human Rhinovirus/Enterovirus Not Detected     Influenza A PCR Not Detected     Influenza B PCR Not Detected     Parainfluenza Virus 1 Not Detected      Parainfluenza Virus 2 Not Detected     Parainfluenza Virus 3 Not Detected     Parainfluenza Virus 4 Not Detected     RSV, PCR Not Detected     Bordetella pertussis pcr Not Detected     Bordetella parapertussis PCR Not Detected     Chlamydophila pneumoniae PCR Not Detected     Mycoplasma pneumo by PCR Not Detected    Narrative:      In the setting of a positive respiratory panel with a viral infection PLUS a negative procalcitonin without other underlying concern for bacterial infection, consider observing off antibiotics or discontinuation of antibiotics and continue supportive care. If the respiratory panel is positive for atypical bacterial infection (Bordetella pertussis, Chlamydophila pneumoniae, or Mycoplasma pneumoniae), consider antibiotic de-escalation to target atypical bacterial infection.            CT Angiogram Coronary-Cardiology Interpretation    Result Date: 10/1/2024  Table formatting from the original result was not included. Images from the original result were not included. CORONARY CT ANGIOGRAPHY WITH CALCIUM SCORE CLINICAL HISTORY:  Cardiomyopathy TECHNIQUE: Using a Siemens Force scanner, a preliminary  study was obtained, followed by coronary artery calcium protocol.  0.5 mm collimated images were obtained through the coronary arteries.  Data were transferred offline for 3D reconstructions using [employer]. CONTRAST: 65 mL iopamidol (ISOVUE-370) ACQUISITION: Retrospective ECG triggered acquisition was used.  Heart rate at the time of acquisition was approximately 60 BPM. MEDICATIONS: Nitroglycerin 0.8 mg sublingual tablet TECHNICAL QUALITY: Excellent, with no artifacts FINDINGS: Coronary artery calcification findings: The total calcium score is 367.3 indicating severe (-999) calcified plaque in the coronary tree. Left main 54.2 .9 LCx 176 RCA 21.2 Grading Scale for Plaque Dows: P1 (CAC 1-100) Mild amount of plaque P2 (-300) Moderate amount of plaque P3 (-999)  Severe amount of plaque P4 (CAC > 1000) Extensive amount of plaque Angiographic Findings: The coronary anatomy is left dominant. Left Main: Normal caliber vessel which bifurcates in the LAD and left circumflex coronary artery.  There is mild calcification associated with no stenosis. LAD: Moderate-sized vessel which gives off 1 moderate-sized diagonal branch.  Scattered calcified plaque in the proximal and mid segment.  In the mid LAD distal to the first diagonal bifurcation, there is focal moderate stenosis.  The moderate-sized diagonal branch has a moderate ostial stenosis (CAD RADS 3) LCX: Large dominant vessel which gives off 1 large obtuse marginal branch as well as a left PDA.  There is scattered calcified plaque in the left circumflex.  There is mild to moderate disease of the first obtuse marginal branch.  The left PDA has mild stenosis. RCA: Small nondominant vessel with mild calcified Grading Scale for Stenosis Severity: Category Degree Interpretation CAD-RADS 0 0% (No plaque or stenosis) Absence of CAD CAD-RADS 1 1-24% (Minimal stenosis or plaque w/o stenosis) Minimal non-obstructive CAD CAD-RADS 2 25-49% (Mild stenosis) Mild non-obstructive CAD CAD-RADS 3 50-69% (Moderate stenosis) Moderate stenosis CAD-RADS 4 A - 70-99% stenosis or B - Left main >/= 50% or 3-vessel obstructive (>/=70%) disease Severe stenosis CAD-RADS 5 100% (total occlusion) Total coronary occlusion or sub-total occlusion CAD-RADS N Non-diagnostic study Obstructive CAD cannot be excluded  Noncoronary Cardiac Findings: Cardiac valves: Aortic valve is trileaflet. There is no thickening or calcification in the aortic and mitral valves. Pericardium: The pericardial contour is preserved with no effusion, thickening, or calcifications. Left ventricle:  Calculated LVEF 43% Non-cardiac findings reported separately by radiology.     Impression: Moderate CAD involving OM1 branch of the left circumflex (CAD RADS 3).  Mild to moderate disease  noted in the mid LAD and nondominant RCA. Location and degree of coronary artery disease does not account for patient's LV systolic dysfunction Severe calcified plaque burden based on  No non-atherosclerotic coronary abnormalities Mildly reduced LV systolic function (calculated LVEF 43%) Please refer to the radiology report for information on non-cardiac structures.   RECOMMENDATION: Medical therapy for CAD should include aspirin, statin GDMT for HFrEF      CT Angiogram Coronary    Result Date: 9/30/2024  CT ANGIOGRAM CORONARY Date of Exam: 9/30/2024 4:33 PM EDT Indication: . Hypertension diabetes Comparison: CT pulmonary angiogram 9/28/2024 Technique: Non-contrasted CT images of the chest were performed for calcium scoring purposes followed by CTA images of the chest after the uneventful intravenous administration of 64 mL Isovue-370. Reconstructed coronal and sagittal images were also obtained. In addition, a 3-D volume rendered image was created for interpretation. Automated exposure control and iterative reconstruction methods were used. Findings: Hilum and Mediastinum: There is a right hilar lymph node measuring 2.1 x 2.2 cm. There is a left hilar mass encasing left upper lung pulmonary arteries abutting the fissure and extending to the pleura measuring 9.9 x 8.8 cm with heterogeneous enhancement. There are suspected postobstructive changes in the left upper lung.. Lung Parenchyma and Pleura: There is additional consolidation at the left lung base. There is bibasilar compressive atelectasis with mild right and moderate left pleural effusion. Upper Abdomen: No acute process. Soft tissues: Unremarkable. Osseous structures: No aggressive focal lytic or sclerotic osseous lesions.     Impression: Impression: 1. Left hilar mass measuring up to 9.9 cm in size partially encasing left upper lung pulmonary vasculature with suspected postobstructive changes in the left upper and left lower lung. Findings are  compatible with neoplasm. 2. Bilateral pleural effusions left greater than right. Please refer to CT Angiogram Coronary   Cardiology Interp report for information on cardiac structures. Electronically Signed: My Jolley MD  9/30/2024 5:13 PM EDT  Workstation ID: [id number]     Results for orders placed during the hospital encounter of 09/27/24    Adult Transthoracic Echo Complete W/ Cont if Necessary Per Protocol    Interpretation Summary    Left ventricular ejection fraction appears to be less than 20%.    The left ventricular cavity is mild to moderately dilated.    The left atrial cavity is mildly dilated.    Estimated right ventricular systolic pressure from tricuspid regurgitation is mildly elevated (38 mmHg).    There is a left pleural effusion. There is a right pleural effusion.      Current medications:  Scheduled Meds:aspirin, 81 mg, Oral, Daily  [Held by provider] empagliflozin, 10 mg, Oral, Daily  enoxaparin, 40 mg, Subcutaneous, Daily  ezetimibe, 10 mg, Oral, Daily  guaiFENesin, 1,200 mg, Oral, Q12H  [Held by provider] insulin glargine, 10 Units, Subcutaneous, Q12H  insulin lispro, 2-9 Units, Subcutaneous, 4x Daily AC & at Bedtime  ipratropium-albuterol, 3 mL, Nebulization, 4x Daily - RT  [Held by provider] losartan, 25 mg, Oral, Daily  metoprolol tartrate, 25 mg, Oral, Q8H  midodrine, 5 mg, Oral, TID AC  pantoprazole, 40 mg, Oral, Q AM  sodium chloride, 10 mL, Intravenous, Q12H      Continuous Infusions:     PRN Meds:.  acetaminophen **OR** acetaminophen **OR** acetaminophen    senna-docusate sodium **AND** polyethylene glycol **AND** bisacodyl **AND** bisacodyl    Calcium Replacement - Follow Nurse / BPA Driven Protocol    cyclobenzaprine    dextrose    dextrose    glucagon (human recombinant)    Magnesium Standard Dose Replacement - Follow Nurse / BPA Driven Protocol    ondansetron ODT **OR** ondansetron    Phosphorus Replacement - Follow Nurse / BPA Driven Protocol    Potassium Replacement -  "Follow Nurse / BPA Driven Protocol    sodium chloride    sodium chloride    sodium chloride    Assessment & Plan   Assessment & Plan     Active Hospital Problems    Diagnosis  POA    **Mass of upper lobe of left lung [R91.8]  Yes    Coronary artery disease involving native coronary artery of native heart without angina pectoris [I25.10]  Yes    HFrEF (heart failure with reduced ejection fraction) [I50.20]  Yes    Demand ischemia [I24.89]  Yes    Lactic acid increased [E87.20]  Yes    Type 2 diabetes mellitus with hyperglycemia, without long-term current use of insulin [E11.65]  Yes    Hyperlipidemia LDL goal <70 [E78.5]  Yes      Resolved Hospital Problems    Diagnosis Date Resolved POA    Pneumonia [J18.9] 09/28/2024 Yes    Acute respiratory failure with hypoxia [J96.01] 09/29/2024 Yes    Sepsis [A41.9] 09/28/2024 Yes    Primary hypertension [I10] 10/01/2024 Yes        Brief Hospital Course to date:  Pati SIMON is a 64 y.o. female presenting to ED with SOA. Says she started to feel ill about 1 week ago at which time \"felt like she had a cold going on.\" Over the week continued to feel weaker. Developed SOA which hit suddenly late last night prompting her to come to ED. Feels better now after receiving tx in ED. Denies cough, high fevers.    Acute decompensated systolic heart failure, improved   cardiomyopathy likely ischemic  Elevated troponin, likely demand ischemia  Patient with symptoms suggestive of congestive heart failure.  She reported that she has been having worsening shortness of breath and lower extremity swelling x 2 weeks  Bedside point-of-care ultrasound concerning for severely reduced left ventricular function.    Formal echo with severe systolic dysfunction, EF less than 20%  Status post IV Lasix diuresis with improvement of her dyspnea  CT coronary angiogram coronary calcium score 367.3. Moderate CAD involving LAD. CAD also noted in left main, circumflex and RCA. Medical management recommended "   GDMT adjustments per cardiology.    Hypotension  In the view of newly diagnosed systolic heart failure  Continue midodrine  Will be limiting factor to escalate systolic heart failure goal-directed therapy    Newly diagnosed left lung neoplasm  CTA chest showed left upper lung neoplasm concerning for malignancy  Pulmonary consulted. Plan for CT-guided biopsy.   Per IR lung mass is not amenable to biopsy.  Recommended PET scan as an outpatient.  Plan for CT abdomen pelvis with contrast today if negative will proceed with bronchoscopy tomorrow per pulmonary.  Oncology consulted for recommendations.      Possible community-acquired pneumonia   Leukocytosis   Even though the patient does not have any convincing signs of pneumonia, she does have leukocytosis and some infiltrates in the left upper lung lobe  Continue IV Rocephin and doxycycline total of 5 days  Continue DuoNebs      Uncontrolled DM w/ hyperglycemia with episodes of hypoglycemia  A1c 7.9%  Episodes of hypoglycemia. Hold long-acting insulin.  Hold Jardiance.  Okay to continue sliding scale       Essential hypertension  Dyslipidemia  Blood pressure is borderline.  Holding home blood pressure medications: Amlodipine and losartan    Expected Discharge Location and Transportation: TBD  Expected Discharge   Expected Discharge Date: 10/4/2024; Expected Discharge Time:      VTE Prophylaxis:  Pharmacologic VTE prophylaxis orders are present.         AM-PAC 6 Clicks Score (PT): 23 (10/01/24 0800)    CODE STATUS:   Code Status and Medical Interventions: CPR (Attempt to Resuscitate); Full Support   Ordered at: 09/27/24 0713     Code Status (Patient has no pulse and is not breathing):    CPR (Attempt to Resuscitate)     Medical Interventions (Patient has pulse or is breathing):    Full Support     Copied text in this note has been reviewed and is accurate as of 10/02/24.     Romana Whiting MD  10/02/24

## 2024-10-02 NOTE — PROGRESS NOTES
Intensive Care Follow-up     Hospital:  LOS: 5 days   Ms. Pati SIMON, 64 y.o. female is followed for:   Mass of upper lobe of left lung            History of present illness:   64-year-old female with a past medical history significant for GERD, hypertension, diabetes mellitus. Who presented to the [facility] ER shortness of breath. She ended up having a large left upper lobe lung mass. Pulmonary consulted for possible biopsy. She is also had to have workup for coronary artery disease with a EF of 20%.       Subjective   Interval History:  Patient breathing overall stable.  Denies any chest pain, nausea, fever, chills.             The patient's past medical, surgical and social history were reviewed and updated in Epic as appropriate.       Objective     Infusions:     Medications:  aspirin, 81 mg, Oral, Daily  carvedilol, 3.125 mg, Oral, BID With Meals  empagliflozin, 10 mg, Oral, Daily  enoxaparin, 40 mg, Subcutaneous, Daily  ezetimibe, 10 mg, Oral, Daily  furosemide, 20 mg, Intravenous, Q12H  guaiFENesin, 1,200 mg, Oral, Q12H  insulin glargine, 10 Units, Subcutaneous, Q12H  insulin lispro, 2-9 Units, Subcutaneous, 4x Daily AC & at Bedtime  ipratropium-albuterol, 3 mL, Nebulization, 4x Daily - RT  losartan, 25 mg, Oral, Daily  midodrine, 5 mg, Oral, TID AC  pantoprazole, 40 mg, Oral, Q AM  sodium chloride, 10 mL, Intravenous, Q12H      I reviewed the patient's medications.    Vital Sign Min/Max for last 24 hours  Temp  Min: 97.7 °F (36.5 °C)  Max: 98.3 °F (36.8 °C)   BP  Min: 101/81  Max: 131/65   Pulse  Min: 62  Max: 74   Resp  Min: 16  Max: 18   SpO2  Min: 90 %  Max: 98 %   Flow (L/min)  Min: 2  Max: 2       Input/Output for last 24 hour shift  No intake/output data recorded.      GENERAL : NAD, conversant  RESPIRATORY/THORAX : normal respiratory effort and no intercostal retractions, CTAB  CARDIOVASCULAR : Normal S1/S2, RRR. no lower ext edema.  GASTROINTESTINAL : Soft, NT/ND. BS x 4  normoactive. No hepatosplenomegaly.  MUSCULOSKELETAL : No cyanosis, clubbing, or ischemia  NEUROLOGICAL: alert and oriented to person, place and time  PSYCHOLOGICAL : Appropriate affect    Results from last 7 days   Lab Units 10/01/24  0445 09/30/24 0447 09/29/24  0548   WBC 10*3/mm3 12.39* 14.35* 16.77*   HEMOGLOBIN g/dL 11.1* 10.7* 10.4*   PLATELETS 10*3/mm3 339 420 428     Results from last 7 days   Lab Units 10/02/24  0404 10/01/24  0445 09/30/24  0447 09/29/24  0548   SODIUM mmol/L 134* 133* 138 136   POTASSIUM mmol/L 4.5 5.5* 4.7 5.1   CO2 mmol/L 33.0* 30.0* 35.0* 32.0*   BUN mg/dL 16 15 21 20   CREATININE mg/dL 0.64 0.58 0.55* 0.63   MAGNESIUM mg/dL  --  2.0 1.7 1.8   PHOSPHORUS mg/dL  --   --  4.1 4.8*   GLUCOSE mg/dL 207* 102* 61* 144*     Estimated Creatinine Clearance: 98.4 mL/min (by C-G formula based on SCr of 0.64 mg/dL).    Results from last 7 days   Lab Units 09/27/24  0532   PH, ARTERIAL pH units 7.331*   PCO2, ARTERIAL mm Hg 58.8*   PO2 ART mm Hg 64.6*       I reviewed the patient's new clinical results.  I reviewed the patient's new imaging results/reports including actual images and agree with reports.       Assessment & Plan   Impression        Mass of upper lobe of left lung    Type 2 diabetes mellitus with hyperglycemia, without long-term current use of insulin    Hyperlipidemia LDL goal <70    Demand ischemia    Lactic acid increased    HFrEF (heart failure with reduced ejection fraction)    Coronary artery disease involving native coronary artery of native heart without angina pectoris    NICM (nonischemic cardiomyopathy)       Plan        - CT scan of the chest from 9/28/2024 showed a large left upper lobe lung mass.  - I personally reviewed the pts labs from this admission see above for more detail  - Echo report from 9/20/2024 showed a EF of 20%     -I have originally recommended a CT-guided needle biopsy, but interventional radiology feels that there is issues technically in doing this,  they had recommended a PET scan and outpatient follow-up.  Unfortunately PET scan would take at least 4 weeks to obtain pushing on her care and extended period of time which in my opinion to be detrimental to the patient's survival.  -We are going to plan on doing a CT of the abdomen pelvis to see if there is any other potential locations for biopsy that would not require undergoing general anesthesia.  -If the CT abdomen pelvis is negative, then we would have to consider proceeding with the bronchoscopy.  Notably the patient would be high risk for complications related anesthesia.  But I have discussed the case with anesthesia and cardiology to take all the necessary precautions.  -Tentatively plan for bronchoscopy tomorrow afternoon pending the results of the CT abdomen pelvis  -I discussed the risk and benefits of the procedure with the patient, this includes but is not limited to hoarseness/vocal cord damage, pain, infection, cough, bleeding, pneumothorax, and anesthesia complications.  Patient verbalized understanding of this and agreed to proceed.  -Wean oxygen to saturation greater than 88%  -Continue nebs as needed  -The patient did not want me to discuss this with the rest of the family, I did ask her if she wanted me to talk with her children and she told me that she had already talked to them and they understand that there are risk associated and that she has a poor prognosis.  -Pulmonary will continue to follow    Guerline De La Cruz, DO  Pulmonary, Critical care and Sleep Medicine

## 2024-10-03 ENCOUNTER — APPOINTMENT (OUTPATIENT)
Dept: MRI IMAGING | Facility: HOSPITAL | Age: 64
DRG: 871 | End: 2024-10-03
Payer: MEDICAID

## 2024-10-03 ENCOUNTER — ANESTHESIA (OUTPATIENT)
Dept: GASTROENTEROLOGY | Facility: HOSPITAL | Age: 64
End: 2024-10-03
Payer: MEDICAID

## 2024-10-03 LAB
ANION GAP SERPL CALCULATED.3IONS-SCNC: 6 MMOL/L (ref 5–15)
BUN SERPL-MCNC: 11 MG/DL (ref 8–23)
BUN/CREAT SERPL: 29.7 (ref 7–25)
CALCIUM SPEC-SCNC: 8.8 MG/DL (ref 8.6–10.5)
CHLORIDE SERPL-SCNC: 97 MMOL/L (ref 98–107)
CO2 SERPL-SCNC: 35 MMOL/L (ref 22–29)
CREAT SERPL-MCNC: 0.37 MG/DL (ref 0.57–1)
EGFRCR SERPLBLD CKD-EPI 2021: 113.5 ML/MIN/1.73
GLUCOSE BLDC GLUCOMTR-MCNC: 100 MG/DL (ref 70–130)
GLUCOSE BLDC GLUCOMTR-MCNC: 174 MG/DL (ref 70–130)
GLUCOSE BLDC GLUCOMTR-MCNC: 213 MG/DL (ref 70–130)
GLUCOSE BLDC GLUCOMTR-MCNC: 82 MG/DL (ref 70–130)
GLUCOSE BLDC GLUCOMTR-MCNC: 84 MG/DL (ref 70–130)
GLUCOSE SERPL-MCNC: 170 MG/DL (ref 65–99)
POTASSIUM SERPL-SCNC: 4.4 MMOL/L (ref 3.5–5.2)
SODIUM SERPL-SCNC: 138 MMOL/L (ref 136–145)

## 2024-10-03 PROCEDURE — 25810000003 LACTATED RINGERS PER 1000 ML: Performed by: ANESTHESIOLOGY

## 2024-10-03 PROCEDURE — 80048 BASIC METABOLIC PNL TOTAL CA: CPT | Performed by: NURSE PRACTITIONER

## 2024-10-03 PROCEDURE — 25010000002 GLYCOPYRROLATE 1 MG/5ML SOLUTION

## 2024-10-03 PROCEDURE — 94799 UNLISTED PULMONARY SVC/PX: CPT

## 2024-10-03 PROCEDURE — 31652 BRONCH EBUS SAMPLNG 1/2 NODE: CPT | Performed by: INTERNAL MEDICINE

## 2024-10-03 PROCEDURE — 0BB78ZX EXCISION OF LEFT MAIN BRONCHUS, VIA NATURAL OR ARTIFICIAL OPENING ENDOSCOPIC, DIAGNOSTIC: ICD-10-PCS | Performed by: INTERNAL MEDICINE

## 2024-10-03 PROCEDURE — 0 GADOBENATE DIMEGLUMINE 529 MG/ML SOLUTION: Performed by: HOSPITALIST

## 2024-10-03 PROCEDURE — 88341 IMHCHEM/IMCYTCHM EA ADD ANTB: CPT | Performed by: INTERNAL MEDICINE

## 2024-10-03 PROCEDURE — 07D78ZX EXTRACTION OF THORAX LYMPHATIC, VIA NATURAL OR ARTIFICIAL OPENING ENDOSCOPIC, DIAGNOSTIC: ICD-10-PCS | Performed by: INTERNAL MEDICINE

## 2024-10-03 PROCEDURE — 25010000002 ONDANSETRON PER 1 MG

## 2024-10-03 PROCEDURE — C1726 CATH, BAL DIL, NON-VASCULAR: HCPCS | Performed by: INTERNAL MEDICINE

## 2024-10-03 PROCEDURE — 63710000001 INSULIN LISPRO (HUMAN) PER 5 UNITS: Performed by: INTERNAL MEDICINE

## 2024-10-03 PROCEDURE — 88305 TISSUE EXAM BY PATHOLOGIST: CPT | Performed by: INTERNAL MEDICINE

## 2024-10-03 PROCEDURE — 63710000001 INSULIN GLARGINE PER 5 UNITS: Performed by: HOSPITALIST

## 2024-10-03 PROCEDURE — 31625 BRONCHOSCOPY W/BIOPSY(S): CPT | Performed by: INTERNAL MEDICINE

## 2024-10-03 PROCEDURE — 99232 SBSQ HOSP IP/OBS MODERATE 35: CPT | Performed by: NURSE PRACTITIONER

## 2024-10-03 PROCEDURE — 82948 REAGENT STRIP/BLOOD GLUCOSE: CPT

## 2024-10-03 PROCEDURE — 25010000002 LIDOCAINE PF 1% 1 % SOLUTION

## 2024-10-03 PROCEDURE — 94664 DEMO&/EVAL PT USE INHALER: CPT

## 2024-10-03 PROCEDURE — 99232 SBSQ HOSP IP/OBS MODERATE 35: CPT | Performed by: HOSPITALIST

## 2024-10-03 PROCEDURE — 25010000002 DEXAMETHASONE PER 1 MG

## 2024-10-03 PROCEDURE — 70553 MRI BRAIN STEM W/O & W/DYE: CPT

## 2024-10-03 PROCEDURE — A9577 INJ MULTIHANCE: HCPCS | Performed by: HOSPITALIST

## 2024-10-03 RX ORDER — SODIUM CHLORIDE 0.9 % (FLUSH) 0.9 %
10 SYRINGE (ML) INJECTION AS NEEDED
Status: DISCONTINUED | OUTPATIENT
Start: 2024-10-03 | End: 2024-10-03 | Stop reason: HOSPADM

## 2024-10-03 RX ORDER — LIDOCAINE HYDROCHLORIDE 10 MG/ML
0.5 INJECTION, SOLUTION EPIDURAL; INFILTRATION; INTRACAUDAL; PERINEURAL ONCE AS NEEDED
Status: DISCONTINUED | OUTPATIENT
Start: 2024-10-03 | End: 2024-10-03 | Stop reason: HOSPADM

## 2024-10-03 RX ORDER — BUPIVACAINE HCL/0.9 % NACL/PF 0.125 %
PLASTIC BAG, INJECTION (ML) EPIDURAL AS NEEDED
Status: DISCONTINUED | OUTPATIENT
Start: 2024-10-03 | End: 2024-10-03 | Stop reason: SURG

## 2024-10-03 RX ORDER — SODIUM CHLORIDE 0.9 % (FLUSH) 0.9 %
10 SYRINGE (ML) INJECTION EVERY 12 HOURS SCHEDULED
Status: DISCONTINUED | OUTPATIENT
Start: 2024-10-03 | End: 2024-10-03 | Stop reason: HOSPADM

## 2024-10-03 RX ORDER — ONDANSETRON 2 MG/ML
4 INJECTION INTRAMUSCULAR; INTRAVENOUS ONCE AS NEEDED
Status: DISCONTINUED | OUTPATIENT
Start: 2024-10-03 | End: 2024-10-03 | Stop reason: HOSPADM

## 2024-10-03 RX ORDER — ROCURONIUM BROMIDE 10 MG/ML
INJECTION, SOLUTION INTRAVENOUS AS NEEDED
Status: DISCONTINUED | OUTPATIENT
Start: 2024-10-03 | End: 2024-10-03 | Stop reason: SURG

## 2024-10-03 RX ORDER — LIDOCAINE HYDROCHLORIDE 40 MG/ML
SOLUTION TOPICAL AS NEEDED
Status: DISCONTINUED | OUTPATIENT
Start: 2024-10-03 | End: 2024-10-03 | Stop reason: SURG

## 2024-10-03 RX ORDER — SODIUM CHLORIDE 9 MG/ML
40 INJECTION, SOLUTION INTRAVENOUS AS NEEDED
Status: DISCONTINUED | OUTPATIENT
Start: 2024-10-03 | End: 2024-10-03 | Stop reason: HOSPADM

## 2024-10-03 RX ORDER — FAMOTIDINE 20 MG/1
20 TABLET, FILM COATED ORAL ONCE
Status: DISCONTINUED | OUTPATIENT
Start: 2024-10-03 | End: 2024-10-03 | Stop reason: HOSPADM

## 2024-10-03 RX ORDER — IPRATROPIUM BROMIDE AND ALBUTEROL SULFATE 2.5; .5 MG/3ML; MG/3ML
3 SOLUTION RESPIRATORY (INHALATION) ONCE AS NEEDED
Status: DISCONTINUED | OUTPATIENT
Start: 2024-10-03 | End: 2024-10-03 | Stop reason: HOSPADM

## 2024-10-03 RX ORDER — LIDOCAINE HYDROCHLORIDE 10 MG/ML
INJECTION, SOLUTION EPIDURAL; INFILTRATION; INTRACAUDAL; PERINEURAL AS NEEDED
Status: DISCONTINUED | OUTPATIENT
Start: 2024-10-03 | End: 2024-10-03 | Stop reason: SURG

## 2024-10-03 RX ORDER — MIDAZOLAM HYDROCHLORIDE 1 MG/ML
1 INJECTION INTRAMUSCULAR; INTRAVENOUS
Status: DISCONTINUED | OUTPATIENT
Start: 2024-10-03 | End: 2024-10-03 | Stop reason: HOSPADM

## 2024-10-03 RX ORDER — DEXAMETHASONE SODIUM PHOSPHATE 4 MG/ML
INJECTION, SOLUTION INTRA-ARTICULAR; INTRALESIONAL; INTRAMUSCULAR; INTRAVENOUS; SOFT TISSUE AS NEEDED
Status: DISCONTINUED | OUTPATIENT
Start: 2024-10-03 | End: 2024-10-03 | Stop reason: SURG

## 2024-10-03 RX ORDER — FAMOTIDINE 10 MG/ML
20 INJECTION, SOLUTION INTRAVENOUS ONCE
Status: DISCONTINUED | OUTPATIENT
Start: 2024-10-03 | End: 2024-10-03 | Stop reason: HOSPADM

## 2024-10-03 RX ORDER — SODIUM CHLORIDE, SODIUM LACTATE, POTASSIUM CHLORIDE, CALCIUM CHLORIDE 600; 310; 30; 20 MG/100ML; MG/100ML; MG/100ML; MG/100ML
9 INJECTION, SOLUTION INTRAVENOUS CONTINUOUS
Status: DISCONTINUED | OUTPATIENT
Start: 2024-10-03 | End: 2024-10-04

## 2024-10-03 RX ORDER — ONDANSETRON 2 MG/ML
INJECTION INTRAMUSCULAR; INTRAVENOUS AS NEEDED
Status: DISCONTINUED | OUTPATIENT
Start: 2024-10-03 | End: 2024-10-03 | Stop reason: SURG

## 2024-10-03 RX ORDER — ETOMIDATE 2 MG/ML
INJECTION INTRAVENOUS AS NEEDED
Status: DISCONTINUED | OUTPATIENT
Start: 2024-10-03 | End: 2024-10-03 | Stop reason: SURG

## 2024-10-03 RX ORDER — GLYCOPYRROLATE 0.2 MG/ML
INJECTION INTRAMUSCULAR; INTRAVENOUS AS NEEDED
Status: DISCONTINUED | OUTPATIENT
Start: 2024-10-03 | End: 2024-10-03 | Stop reason: SURG

## 2024-10-03 RX ADMIN — Medication 10 ML: at 09:01

## 2024-10-03 RX ADMIN — Medication 200 MCG: at 15:03

## 2024-10-03 RX ADMIN — CARVEDILOL 3.12 MG: 3.12 TABLET, FILM COATED ORAL at 18:19

## 2024-10-03 RX ADMIN — GUAIFENESIN 1200 MG: 600 TABLET, EXTENDED RELEASE ORAL at 21:54

## 2024-10-03 RX ADMIN — ETOMIDATE INJECTION 20 MG: 2 SOLUTION INTRAVENOUS at 14:52

## 2024-10-03 RX ADMIN — LIDOCAINE HYDROCHLORIDE 50 MG: 10 INJECTION, SOLUTION EPIDURAL; INFILTRATION; INTRACAUDAL; PERINEURAL at 14:52

## 2024-10-03 RX ADMIN — DEXAMETHASONE SODIUM PHOSPHATE 8 MG: 4 INJECTION INTRA-ARTICULAR; INTRALESIONAL; INTRAMUSCULAR; INTRAVENOUS; SOFT TISSUE at 14:56

## 2024-10-03 RX ADMIN — ASPIRIN 81 MG: 81 TABLET, COATED ORAL at 08:57

## 2024-10-03 RX ADMIN — MIDODRINE HYDROCHLORIDE 5 MG: 5 TABLET ORAL at 11:41

## 2024-10-03 RX ADMIN — ROCURONIUM BROMIDE 50 MG: 10 INJECTION INTRAVENOUS at 14:52

## 2024-10-03 RX ADMIN — INSULIN LISPRO 2 UNITS: 100 INJECTION, SOLUTION INTRAVENOUS; SUBCUTANEOUS at 08:57

## 2024-10-03 RX ADMIN — INSULIN LISPRO 4 UNITS: 100 INJECTION, SOLUTION INTRAVENOUS; SUBCUTANEOUS at 21:54

## 2024-10-03 RX ADMIN — SODIUM CHLORIDE, POTASSIUM CHLORIDE, SODIUM LACTATE AND CALCIUM CHLORIDE 9 ML/HR: 600; 310; 30; 20 INJECTION, SOLUTION INTRAVENOUS at 13:59

## 2024-10-03 RX ADMIN — INSULIN GLARGINE 10 UNITS: 100 INJECTION, SOLUTION SUBCUTANEOUS at 21:54

## 2024-10-03 RX ADMIN — EMPAGLIFLOZIN 10 MG: 10 TABLET, FILM COATED ORAL at 08:58

## 2024-10-03 RX ADMIN — GLYCOPYRROLATE 0.1 MCG: 0.2 INJECTION INTRAMUSCULAR; INTRAVENOUS at 15:02

## 2024-10-03 RX ADMIN — LIDOCAINE HYDROCHLORIDE 1 EACH: 40 SOLUTION TOPICAL at 14:54

## 2024-10-03 RX ADMIN — ONDANSETRON 4 MG: 2 INJECTION INTRAMUSCULAR; INTRAVENOUS at 15:02

## 2024-10-03 RX ADMIN — INSULIN GLARGINE 10 UNITS: 100 INJECTION, SOLUTION SUBCUTANEOUS at 09:01

## 2024-10-03 RX ADMIN — Medication 200 MCG: at 14:56

## 2024-10-03 RX ADMIN — GADOBENATE DIMEGLUMINE 12 ML: 529 INJECTION, SOLUTION INTRAVENOUS at 06:05

## 2024-10-03 RX ADMIN — CARVEDILOL 3.12 MG: 3.12 TABLET, FILM COATED ORAL at 08:57

## 2024-10-03 RX ADMIN — PANTOPRAZOLE SODIUM 40 MG: 40 TABLET, DELAYED RELEASE ORAL at 06:30

## 2024-10-03 RX ADMIN — MIDODRINE HYDROCHLORIDE 5 MG: 5 TABLET ORAL at 17:26

## 2024-10-03 RX ADMIN — MIDODRINE HYDROCHLORIDE 5 MG: 5 TABLET ORAL at 06:31

## 2024-10-03 RX ADMIN — IPRATROPIUM BROMIDE AND ALBUTEROL SULFATE 3 ML: 2.5; .5 SOLUTION RESPIRATORY (INHALATION) at 19:58

## 2024-10-03 RX ADMIN — LOSARTAN POTASSIUM 25 MG: 25 TABLET, FILM COATED ORAL at 08:58

## 2024-10-03 RX ADMIN — GUAIFENESIN 1200 MG: 600 TABLET, EXTENDED RELEASE ORAL at 08:56

## 2024-10-03 RX ADMIN — IPRATROPIUM BROMIDE AND ALBUTEROL SULFATE 3 ML: 2.5; .5 SOLUTION RESPIRATORY (INHALATION) at 08:38

## 2024-10-03 RX ADMIN — Medication 10 ML: at 21:54

## 2024-10-03 NOTE — PROGRESS NOTES
Bronchoscopy showed almost complete obstruction of the left mainstem bronchus.  Biopsies were obtained.  Patient is already being followed by oncology we will also go ahead and consult radiation oncology, suspect palliative radiation will be needed otherwise patient will collapse of the left lung.  Continue with airway clearance mechanisms.    Electronically signed by Alirio De La Cruz DO, 10/03/24, 3:28 PM EDT.

## 2024-10-03 NOTE — CASE MANAGEMENT/SOCIAL WORK
Continued Stay Note  [facility]     Patient Name: Pati VAN  MRN: [id number]  Today's Date: 10/3/2024    Admit Date: 9/27/2024    Plan: Home   Discharge Plan       Row Name 10/03/24 1429       Plan    Plan Home    Patient/Family in Agreement with Plan yes    Plan Comments Discussed in MDR. She is not medically ready for discharge today. Ms. Van is to have a bronchoscopy today. CM will continue to follow and provide assistance with discharge planning as recommendations become available.    Final Discharge Disposition Code 01 - home or self-care                   Discharge Codes    No documentation.                 Expected Discharge Date and Time       Expected Discharge Date Expected Discharge Time    Oct 4, 2024               Rozina Cosby RN

## 2024-10-03 NOTE — ANESTHESIA PROCEDURE NOTES
Airway  Urgency: elective    Date/Time: 10/3/2024 2:54 PM  Airway not difficult    General Information and Staff    Patient location during procedure: OR  CRNA/CAA: Milli Blanco CRNA    Indications and Patient Condition  Indications for airway management: airway protection    Preoxygenated: yes  MILS not maintained throughout  Mask difficulty assessment: 1 - vent by mask    Final Airway Details  Final airway type: endotracheal airway      Successful airway: ETT  Cuffed: yes   Successful intubation technique: video laryngoscopy  Endotracheal tube insertion site: oral  Blade: Cuevas  Blade size: 3  ETT size (mm): 8.5  Cormack-Lehane Classification: grade I - full view of glottis  Placement verified by: chest auscultation and capnometry   Cuff volume (mL): 7  Measured from: lips  ETT/EBT  to lips (cm): 23  Number of attempts at approach: 1  Assessment: lips, teeth, and gum same as pre-op and atraumatic intubation    Additional Comments  Negative epigastric sounds, Breath sound equal bilaterally with symmetric chest rise and fall

## 2024-10-03 NOTE — PROGRESS NOTES
[facility] Medicine Services  PROGRESS NOTE    Patient Name: Pati SIMON  : 1960  MRN: [id number]    Date of Admission: 2024  Primary Care Physician: No primary care provider on file.    Subjective   Subjective     CC:  For shortness of breath    HPI:  No acute events over the night.  Patient was sleeping this morning.  Plan for bronchoscopy.  Cleared by cardiology.    Objective   Objective     Vital Signs:   Temp:  [97.7 °F (36.5 °C)-98.3 °F (36.8 °C)] 97.7 °F (36.5 °C)  Heart Rate:  [67-77] 73  Resp:  [16-19] 19  BP: (101-126)/(54-84) 126/63  Flow (L/min):  [2] 2     Physical Exam:  General: Comfortable,   Head: Atraumatic and normocephalic  Eyes: No Icterus. No pallor  Ears:  Ears appear intact with no abnormalities noted  Throat: No oral lesions, no thrush  Neck: Supple, trachea midline  Lungs: Markedly diminished air entry left lung base.  Bilateral wheezing  Heart:  Normal S1 and S2, no murmur, no gallop, No JVD, 3+ lower extremity swelling  Abdomen:  Soft, no tenderness, no organomegaly, normal bowel sounds, no organomegaly  Extremities: pulses equal bilaterally  Skin: No bleeding, bruising or rash, normal skin turgor and elasticity  Neurologic: Cranial nerves appear intact with no evidence of facial asymmetry, normal motor and sensory functions in all 4 extremities  Psych: Alert and oriented x 3, normal mood    Results Reviewed:  LAB RESULTS:      Lab 10/01/24  0445 24  0447 24  0548 24  0401 24  0900 24  0523   WBC 12.39* 14.35* 16.77* 16.19*  --  17.88*   HEMOGLOBIN 11.1* 10.7* 10.4* 10.2*  --  11.7*   HEMATOCRIT 36.3 35.4 34.1 32.9*  --  37.7   PLATELETS 339 420 428 428  --  543*   NEUTROS ABS 9.21* 10.43* 12.79*  --   --  15.22*   IMMATURE GRANS (ABS) 0.05 0.07* 0.06*  --   --  0.08*   LYMPHS ABS 1.35 1.73 1.96  --   --  1.02   MONOS ABS 1.72* 2.04* 1.92*  --   --  1.50*   EOS ABS 0.04 0.05 0.02  --   --  0.02   MCV 81.9 81.9 81.2  81.0  --  80.0   CRP  --   --   --  4.30*  --   --    PROCALCITONIN  --   --   --  0.19  --   --    LACTATE  --   --   --   --  1.2 2.5*   HSTROP T  --   --   --   --   --  36*         Lab 10/02/24  0404 10/01/24  0445 09/30/24  0447 09/29/24  0548 09/28/24  0401   SODIUM 134* 133* 138 136 131*   POTASSIUM 4.5 5.5* 4.7 5.1 4.5   CHLORIDE 94* 95* 97* 97* 94*   CO2 33.0* 30.0* 35.0* 32.0* 30.0*   ANION GAP 7.0 8.0 6.0 7.0 7.0   BUN 16 15 21 20 20   CREATININE 0.64 0.58 0.55* 0.63 0.52*   EGFR 99.4 101.8 103.1 99.8 104.5   GLUCOSE 207* 102* 61* 144* 143*   CALCIUM 8.6 8.4* 8.8 9.4 9.2   MAGNESIUM  --  2.0 1.7 1.8  --    PHOSPHORUS  --   --  4.1 4.8*  --    HEMOGLOBIN A1C  --   --   --   --  12.10*         Lab 10/01/24  0445 09/30/24  0447 09/29/24  0548 09/27/24  0523   TOTAL PROTEIN 5.1* 5.6* 6.3 7.0   ALBUMIN 2.8* 3.2* 3.2* 3.6   GLOBULIN 2.3 2.4 3.1 3.4   ALT (SGPT) 16 24 32 16   AST (SGOT) 14 14 29 38*   BILIRUBIN 0.2 0.2 <0.2 0.4   ALK PHOS 117 137* 158* 163*         Lab 09/28/24  0401 09/27/24  0523   PROBNP 8,460.0* 6,196.0*   HSTROP T  --  36*         Lab 09/30/24 0447 09/28/24  0401   CHOLESTEROL 155 146   LDL CHOL  --  93   HDL CHOL  --  44   TRIGLYCERIDES 74 39             Lab 09/27/24  0532   PH, ARTERIAL 7.331*   PCO2, ARTERIAL 58.8*   PO2 ART 64.6*   FIO2 36   HCO3 ART 31.0*   BASE EXCESS ART 3.8*   CARBOXYHEMOGLOBIN 5.2*     Brief Urine Lab Results       None            Microbiology Results Abnormal       Procedure Component Value - Date/Time    Blood Culture - Blood, Arm, Right [[id number]]  (Normal) Collected: 09/27/24 0601    Lab Status: Final result Specimen: Blood from Arm, Right Updated: 10/02/24 0645     Blood Culture No growth at 5 days    Narrative:      Less than seven (7) mL's of blood was collected.  Insufficient quantity may yield false negative results.    Blood Culture - Blood, Arm, Left [[id number]]  (Normal) Collected: 09/27/24 0459    Lab Status: Final result Specimen: Blood from Arm,  Left Updated: 10/02/24 0645     Blood Culture No growth at 5 days    Narrative:      Less than seven (7) mL's of blood was collected.  Insufficient quantity may yield false negative results.    MRSA Screen, PCR (Inpatient) - Swab, Nares [[id number]]  (Normal) Collected: 09/28/24 1157    Lab Status: Final result Specimen: Swab from Nares Updated: 09/28/24 1413     MRSA PCR Negative    Narrative:      The negative predictive value of this diagnostic test is high and should only be used to consider de-escalating anti-MRSA therapy. A positive result may indicate colonization with MRSA and must be correlated clinically.  MRSA Negative    S. Pneumo Ag Urine or CSF - Urine, Urine, Clean Catch [[id number]]  (Normal) Collected: 09/27/24 1258    Lab Status: Final result Specimen: Urine, Clean Catch Updated: 09/27/24 1909     Strep Pneumo Ag Negative    Legionella Antigen, Urine - Urine, Urine, Clean Catch [[id number]]  (Normal) Collected: 09/27/24 1258    Lab Status: Final result Specimen: Urine, Clean Catch Updated: 09/27/24 1908     LEGIONELLA ANTIGEN, URINE Negative    Narrative:      2025-09-26    Respiratory Panel PCR w/COVID-19(SARS-CoV-2) ARNAV/NAIMA/AUNG/PAD/COR/JE In-House, NP Swab in UTM/VTM, 2 HR TAT - Swab, Nasopharynx [[id number]]  (Normal) Collected: 09/27/24 0524    Lab Status: Final result Specimen: Swab from Nasopharynx Updated: 09/27/24 0621     ADENOVIRUS, PCR Not Detected     Coronavirus 229E Not Detected     Coronavirus HKU1 Not Detected     Coronavirus NL63 Not Detected     Coronavirus OC43 Not Detected     COVID19 Not Detected     Human Metapneumovirus Not Detected     Human Rhinovirus/Enterovirus Not Detected     Influenza A PCR Not Detected     Influenza B PCR Not Detected     Parainfluenza Virus 1 Not Detected     Parainfluenza Virus 2 Not Detected     Parainfluenza Virus 3 Not Detected     Parainfluenza Virus 4 Not Detected     RSV, PCR Not Detected     Bordetella pertussis pcr Not Detected     Bordetella  parapertussis PCR Not Detected     Chlamydophila pneumoniae PCR Not Detected     Mycoplasma pneumo by PCR Not Detected    Narrative:      In the setting of a positive respiratory panel with a viral infection PLUS a negative procalcitonin without other underlying concern for bacterial infection, consider observing off antibiotics or discontinuation of antibiotics and continue supportive care. If the respiratory panel is positive for atypical bacterial infection (Bordetella pertussis, Chlamydophila pneumoniae, or Mycoplasma pneumoniae), consider antibiotic de-escalation to target atypical bacterial infection.            MRI Brain With & Without Contrast    Result Date: 10/3/2024  MRI BRAIN W WO CONTRAST Date of Exam: 10/3/2024 6:03 AM EDT Indication: lung cancer staging.  Comparison: None available. Technique:  Routine multiplanar/multisequence sequence images of the brain were obtained before and after the uneventful administration of 12 mL Multihance. Findings: No diffusion restriction. Major arterial flow voids appear intact. No mass effect, midline shift or abnormal extra-axial collection. There is mild bilateral maxillary sinus mucosal thickening and mild atelectasis of the right maxillary sinus. Trace left mastoid effusion. Midline structures appear intact. Temporomandibular joints and parotid glands appear normal. Superficial soft tissues appear unremarkable. There are 2 small round areas of enhancement within the left frontal calvarium on thick slice postcontrast axial T1 sequence images 25-22 each measuring 9 mm diameter. These appear hyperintense on T2 imaging and isointense on T1 imaging. These may represent hemangiomas, but metastasis are possible. There are mild scattered patchy and small rounded areas of FLAIR hyperintense signal within the cerebral white matter. Ventricular size and configuration is normal for age. No acute or chronic intracranial hemorrhage. No enhancing lesions in the brain or  cerebellum.     Impression: Impression: 1.No acute intracranial abnormality. No evidence of intracranial metastatic disease. 2.There are 2 small round areas of enhancement within the left frontal calvarium. These could represent hemangiomas, but metastasis are possible. Consider evaluation with bone scan. CT may provide more information regarding bony anatomy. If a PET/CT is planned for this patient's work-up then inclusion of this portion of the head should be requested. 3.Mild chronic small vessel ischemic change. 4.Mild paranasal sinus mucosal disease. Electronically Signed: Saeed Singh MD  10/3/2024 6:24 AM EDT  Workstation ID: [id number]    CT Abdomen Pelvis With Contrast    Result Date: 10/2/2024  CT ABDOMEN PELVIS W CONTRAST Date of Exam: 10/2/2024 1:38 PM EDT Indication: Lung mass concerning for malignancy.  Staging/looking for amenable biopsy if found. Comparison: None available. Technique: Axial CT images were obtained of the abdomen and pelvis following the uneventful intravenous administration of 85 mL Isovue-300. Reconstructed coronal and sagittal images were also obtained. Automated exposure control and iterative construction methods were used. Findings: Liver: The liver is unremarkable in morphology. No focal liver lesion is seen. No biliary dilation is seen. Gallbladder: Tiny calcified gallstone. Gallbladder is otherwise unremarkable Pancreas: Unremarkable. Spleen: Unremarkable. Adrenal glands: Mildly prominent/nodular left adrenal gland versus adjacent prominent celiac ganglion. Genitourinary tract: Multifocal cortical scarring within the right kidney. Kidneys are otherwise unremarkable. No hydronephrosis is seen. Visualized portions of the ureters and urinary bladder appear unremarkable. 4.2 cm complex lesion within the right adnexa containing macroscopic fat, likely an ovarian dermoid. Gastrointestinal tract: Infraumbilical hernia defect contains a loop of nonobstructed transverse colon. Large  amount of stool throughout the colon no findings to suggest bowel obstruction. Appendix: The appendix is not identified Other findings: No free air or significant free fluid. No pathologically enlarged lymph nodes are seen. Vascular calcifications are present. The IVC is unremarkable. Bones and soft tissues: No acute osseous lesion is identified. Superficial soft tissue anasarca is present. Just below the umbilicus, there is a ventral hernia defect containing a nonobstructing loop of colon. Slightly inferior to this is an additional ventral hernia defect containing only fat. Small fat-containing umbilical hernia also noted. Superficial soft tissue anasarca is seen. Lung bases: Partially visualized left pleural effusion with left basilar atelectasis. Please refer to chest CTA from 9/28/2024.     Impression: Impression: 1.No acute abnormality identified within the abdomen or pelvis. 2.Mildly prominent/nodular left adrenal gland versus adjacent prominent celiac ganglion. 3.Several ventral abdominal wall hernia defects, 1 of which contains nonobstructed transverse colon. 4.Large amount of stool throughout the colon. 5.4.2 cm complex lesion within the right adnexa containing macroscopic fat, likely an ovarian dermoid. 6.Cholelithiasis. 7.Partially visualized left pleural effusion with left basilar atelectasis. Please refer to chest CTA from 9/28/2024. Electronically Signed: Davide Craig MD  10/2/2024 2:09 PM EDT  Workstation ID: [id number]     Results for orders placed during the hospital encounter of 09/27/24    Adult Transthoracic Echo Complete W/ Cont if Necessary Per Protocol    Interpretation Summary    Left ventricular ejection fraction appears to be less than 20%.    The left ventricular cavity is mild to moderately dilated.    The left atrial cavity is mildly dilated.    Estimated right ventricular systolic pressure from tricuspid regurgitation is mildly elevated (38 mmHg).    There is a left pleural effusion.  There is a right pleural effusion.      Current medications:  Scheduled Meds:aspirin, 81 mg, Oral, Daily  carvedilol, 3.125 mg, Oral, BID With Meals  empagliflozin, 10 mg, Oral, Daily  enoxaparin, 40 mg, Subcutaneous, Daily  ezetimibe, 10 mg, Oral, Daily  furosemide, 20 mg, Intravenous, Q12H  guaiFENesin, 1,200 mg, Oral, Q12H  insulin glargine, 10 Units, Subcutaneous, Q12H  insulin lispro, 2-9 Units, Subcutaneous, 4x Daily AC & at Bedtime  ipratropium-albuterol, 3 mL, Nebulization, 4x Daily - RT  losartan, 25 mg, Oral, Daily  midodrine, 5 mg, Oral, TID AC  pantoprazole, 40 mg, Oral, Q AM  sodium chloride, 10 mL, Intravenous, Q12H      Continuous Infusions:     PRN Meds:.  acetaminophen **OR** acetaminophen **OR** acetaminophen    senna-docusate sodium **AND** polyethylene glycol **AND** bisacodyl **AND** bisacodyl    Calcium Replacement - Follow Nurse / BPA Driven Protocol    cyclobenzaprine    dextrose    dextrose    glucagon (human recombinant)    Magnesium Standard Dose Replacement - Follow Nurse / BPA Driven Protocol    ondansetron ODT **OR** ondansetron    Phosphorus Replacement - Follow Nurse / BPA Driven Protocol    Potassium Replacement - Follow Nurse / BPA Driven Protocol    sodium chloride    sodium chloride    sodium chloride    Assessment & Plan   Assessment & Plan     Active Hospital Problems    Diagnosis  POA    **Mass of upper lobe of left lung [R91.8]  Yes    NICM (nonischemic cardiomyopathy) [I42.8]  Yes    Coronary artery disease involving native coronary artery of native heart without angina pectoris [I25.10]  Yes    HFrEF (heart failure with reduced ejection fraction) [I50.20]  Yes    Type 2 diabetes mellitus with hyperglycemia, without long-term current use of insulin [E11.65]  Yes    Hyperlipidemia LDL goal <70 [E78.5]  Yes      Resolved Hospital Problems    Diagnosis Date Resolved POA    Pneumonia [J18.9] 09/28/2024 Yes    Acute respiratory failure with hypoxia [J96.01] 09/29/2024 Yes     "Sepsis [A41.9] 09/28/2024 Yes    Demand ischemia [I24.89] 10/02/2024 Yes    Lactic acid increased [E87.20] 10/02/2024 Yes    Primary hypertension [I10] 10/01/2024 Yes        Brief Hospital Course to date:  Pati SIMON is a 64 y.o. female presenting to ED with SOA. Says she started to feel ill about 1 week ago at which time \"felt like she had a cold going on.\" Over the week continued to feel weaker. Developed SOA which hit suddenly late last night prompting her to come to ED. Feels better now after receiving tx in ED. Denies cough, high fevers.    Acute decompensated systolic heart failure, improved   cardiomyopathy likely ischemic  Elevated troponin, likely demand ischemia  Patient with symptoms suggestive of congestive heart failure.  She reported that she has been having worsening shortness of breath and lower extremity swelling x 2 weeks  Bedside point-of-care ultrasound concerning for severely reduced left ventricular function.    Formal echo with severe systolic dysfunction, EF less than 20%  Status post IV Lasix diuresis with improvement of her dyspnea  CT coronary angiogram coronary calcium score 367.3. Moderate CAD involving LAD. CAD also noted in left main, circumflex and RCA. Medical management recommended   GDMT adjustments per cardiology.    Hypotension  In the view of newly diagnosed systolic heart failure  Continue midodrine  Will be limiting factor to escalate systolic heart failure goal-directed therapy    Newly diagnosed left lung neoplasm.  Stage IV?  CTA chest showed left upper lung neoplasm concerning for malignancy  Pulmonary consulted. Plan was for CT-guided biopsy.   Per IR lung mass is not amenable to biopsy.  CT abdomen pelvis with contrast negative for metastasis  Plan for bronchoscopy today.  Cleared by cardiology.  Oncology consulted. Recommendations pending biopsy results.      Possible community-acquired pneumonia   Leukocytosis   Even though the patient does not have any convincing " signs of pneumonia, she does have leukocytosis and some infiltrates in the left upper lung lobe  Continue IV Rocephin and doxycycline total of 5 days  Continue DuoNebs      Uncontrolled DM w/ hyperglycemia with episodes of hypoglycemia  A1c 7.9%  Episodes of hypoglycemia. Resolved  resume long-acting insulin.  Hold Jardiance.   continue sliding scale       Essential hypertension  Dyslipidemia  Blood pressure is borderline.   Blood pressure management per cardiology.    Expected Discharge Location and Transportation: TBD  Expected Discharge   Expected Discharge Date: 10/4/2024; Expected Discharge Time:      VTE Prophylaxis:  Pharmacologic VTE prophylaxis orders are present.         AM-PAC 6 Clicks Score (PT): 23 (10/02/24 0800)    CODE STATUS:   Code Status and Medical Interventions: CPR (Attempt to Resuscitate); Full Support   Ordered at: 09/27/24 0713     Code Status (Patient has no pulse and is not breathing):    CPR (Attempt to Resuscitate)     Medical Interventions (Patient has pulse or is breathing):    Full Support     Copied text in this note has been reviewed and is accurate as of 10/03/24.     Romana Whiting MD  10/03/24

## 2024-10-03 NOTE — PROGRESS NOTES
Cardiology Progress Note      Reason for visit:    Acute systolic heart failure  Coronary artery disease    IDENTIFICATION: 64-year-old female who resides in [place], [place]    Active Hospital Problems    Diagnosis  POA    **Mass of upper lobe of left lung [R91.8]  Yes     Priority: High    NICM (nonischemic cardiomyopathy) [I42.8]  Yes     Priority: High     Echo (9/20/2024): LVEF 20%  Coronary angiogram (9/30/2024): Mild to moderate CAD.  CAD burden does not account for degree of LV dysfunction      HFrEF (heart failure with reduced ejection fraction) [I50.20]  Yes     Priority: High     Echo (9/20/2024): LVEF 20%.  Mildly dilated left atrium.  Mild MR.  CT coronary angiogram (9/30/2024): Mild to moderate CAD.  CAD burden does not account for degree of LV dysfunction.      Type 2 diabetes mellitus with hyperglycemia, without long-term current use of insulin [E11.65]  Yes     Priority: High    Coronary artery disease involving native coronary artery of native heart without angina pectoris [I25.10]  Yes     Priority: Medium     CT coronary angiogram (9/30/2024): Mild to moderate CAD.  CAD burden does not account for degree of LV dysfunction.      Hyperlipidemia LDL goal <70 [E78.5]  Yes     Priority: Low     Statin therapy indicated due to the presence of CAD  Intolerant to atorvastatin and rosuvastatin              No events noted overnight.  She denies any chest pain.  She is lying flat in bed breathing easy on O2 2 L via nasal cannula.           Vital Sign Min/Max for last 24 hours  Temp  Min: 97.7 °F (36.5 °C)  Max: 98.3 °F (36.8 °C)   BP  Min: 101/54  Max: 126/66   Pulse  Min: 67  Max: 77   Resp  Min: 16  Max: 18   SpO2  Min: 95 %  Max: 99 %   Flow (L/min)  Min: 2  Max: 2    No intake or output data in the 24 hours ending 10/03/24 0806        Physical Exam  Constitutional:       General: She is awake.   Cardiovascular:      Rate and Rhythm: Normal rate and regular rhythm.      Heart sounds: No murmur  heard.  Pulmonary:      Effort: Pulmonary effort is normal.      Breath sounds: Decreased breath sounds present.   Skin:     General: Skin is warm and dry.   Neurological:      Mental Status: She is alert and oriented to person, place, and time.   Psychiatric:         Behavior: Behavior is cooperative.           Tele: Sinus rhythm sinus tachycardia     Results Review (reviewed the patient's recent labs in the electronic medical record):      EKG (9/27/2024): Sinus tachycardia with frequent PVCs     CT pulmonary/coronary angiogram (9/30/2024): Left hilar mass 9.9 cm in size encasing left upper lung.  Pulmonary vascular with suspected postobstructive changes in left upper and left lower lung compatible with neoplasm.  Bilateral pleural effusions left greater than right.     Coronary CT angiogram (9/30/2024): Coronary portion shows coronary calcium score of 367.3.  Moderate CAD involving LAD.  Mildly reduced LVEF 43%.     ECHO (9/28/2024): LVEF less than 20%.  Bilateral pleural effusions.  RVSP 38 mmHg    Results from last 7 days   Lab Units 10/02/24  0404 10/01/24  0445 09/30/24  0447 09/29/24  0548 09/28/24  0401 09/27/24  0523   SODIUM mmol/L 134* 133* 138 136 131* 131*   POTASSIUM mmol/L 4.5 5.5* 4.7 5.1 4.5 4.1   CHLORIDE mmol/L 94* 95* 97* 97* 94* 89*   BUN mg/dL 16 15 21 20 20 10   CREATININE mg/dL 0.64 0.58 0.55* 0.63 0.52* 0.52*   MAGNESIUM mg/dL  --  2.0 1.7 1.8  --   --      Results from last 7 days   Lab Units 09/27/24  0523   HSTROP T ng/L 36*     Results from last 7 days   Lab Units 10/01/24  0445 09/30/24  0447 09/29/24  0548   WBC 10*3/mm3 12.39* 14.35* 16.77*   HEMOGLOBIN g/dL 11.1* 10.7* 10.4*   HEMATOCRIT % 36.3 35.4 34.1   PLATELETS 10*3/mm3 339 420 428       Lab Results   Component Value Date    HGBA1C 12.10 (H) 09/28/2024       Lab Results   Component Value Date    CHOL 155 09/30/2024    TRIG 74 09/30/2024    HDL 44 09/28/2024    LDL 93 09/28/2024              Acute systolic heart failure  LVEF  about 20%  CT coronary angiogram shows no obstructive disease of proximal vessels to account for LV dysfunction  Patient appears compensated  Carvedilol 3.25 mg twice daily  Losartan 25 mg daily  Jardiance 10 mg daily  Lasix 20 mg IV twice daily          Coronary artery disease  No obstructive CAD of proximal vessels  Medical therapy recommended, including antiplatelet, statin, beta-blocker        Hypotension  Improved with midodrine at 5 mg 3 times daily.  Received 2 of 3 doses on 10/2  Current /63     Hyperlipidemia with target LDL <70  Patient refuses statins reports intolerance  Zetia 10 mg daily  Would benefit from PCSK9 inhibitor initiation at discharge        Type 2 diabetes mellitus not on insulin with hyperglycemia  Continue empagliflozin for HFrEF and DM     Left lung mass concerning for invasive cancer  Management per hospitalistist, oncology and pulmonology  Patient is increased but acceptable cardiovascular risk to undergo general anesthesia for bronchoscopy                Continue IV Lasix will transition to p.o. tomorrow  Continue guideline directed medical therapy with losartan, carvedilol, and Jardiance  Defer statin therapy due to intolerance but continue Zetia.  Consider PCSK9 inhibitor at discharge  Defer LifeVest due to likely having stage IV lung carcinoma    Electronically signed by WOLF Rhodes, 10/03/24, 8:06 AM EDT.

## 2024-10-03 NOTE — PLAN OF CARE
Goal Outcome Evaluation:               Pt A&Ox4. 2L NC. Had bronchoscopy procedure today. No complaints of pain or SOA at this time. Pt in bed, call light in reach. POC ongoing.

## 2024-10-03 NOTE — ANESTHESIA POSTPROCEDURE EVALUATION
Patient: Pati SIMON    Procedure Summary       Date: 10/03/24 Room / Location:  [facility]    Anesthesia Start: 1446 Anesthesia Stop: 1536    Procedure: BRONCHOSCOPY WITH ENDOBRONCHIAL ULTRASOUND (Bronchus) Diagnosis:     Surgeons: Alirio De La Cruz DO Provider: Miguelangel Coley MD    Anesthesia Type: general ASA Status: 4            Anesthesia Type: general    Vitals  Vitals Value Taken Time   /64 10/03/24 1536   Temp 97.7 °F (36.5 °C) 10/03/24 1536   Pulse 71 10/03/24 1536   Resp 20 10/03/24 1536   SpO2 97 % 10/03/24 1536           Post Anesthesia Care and Evaluation    Patient location during evaluation: PACU  Patient participation: complete - patient participated  Level of consciousness: awake and alert  Pain management: adequate    Airway patency: patent  Anesthetic complications: No anesthetic complications  PONV Status: none  Cardiovascular status: hemodynamically stable and acceptable  Respiratory status: nonlabored ventilation, acceptable and face mask  Hydration status: acceptable

## 2024-10-03 NOTE — ANESTHESIA PREPROCEDURE EVALUATION
Anesthesia Evaluation     Patient summary reviewed and Nursing notes reviewed   NPO Solid Status: > 8 hours  NPO Liquid Status: > 2 hours           Airway   Mallampati: II  TM distance: >3 FB  Neck ROM: full  No difficulty expected  Dental    (+) poor dentition        Pulmonary    (+) pneumonia stable , a smoker Current, cigarettes,shortness of breath  (-) sleep apneaCOPD: MDI.    ROS comment: Sudden onset SOA at home after feeling poorly for 1 week found to have sepsis due to left lung pna.     Sepsis due to PNA  Acute hypoxic respiratory failuore due to left lung CAP    Elevated troponin  --Likely due to demand related to above. No CP, no ischemic EKG changes.     Cardiovascular     ECG reviewed    (+) hypertension, CAD, CHF Systolic <55%, hyperlipidemia  (-) past MI, cardiac stents    ROS comment: SR with frequent PVCs  in a pattern of bigeminy  Low voltage QRS  NS TW abn      ECHO  20%.  LV LA RVSP moderately dilated- mildly elevated (38 mmHg).  L and R pleural effusion.     Neuro/Psych  (-) seizures, CVA  GI/Hepatic/Renal/Endo    (+) GERD, diabetes mellitus type 2 poorly controlled  (-) no renal disease, no thyroid disorder    Musculoskeletal     Abdominal    Substance History      OB/GYN          Other        ROS/Med Hx Other: Mass of upper lobe of left lung      Demand ischemia    Lactic acid increased    HFrEF (heart failure with reduced ejection fraction)    Coronary artery disease involving native coronary artery of native heart without angina pectoris    NICM (nonischemic cardiomyopathy)           Phys Exam Other: Loose lower teeth               Anesthesia Plan    ASA 4     general     (A line placed pre op   )  intravenous induction     Anesthetic plan, risks, benefits, and alternatives have been provided, discussed and informed consent has been obtained with: patient.    Plan discussed with CRNA.      CODE STATUS:    Code Status (Patient has no pulse and is not breathing): CPR (Attempt to  Resuscitate)  Medical Interventions (Patient has pulse or is breathing): Full Support

## 2024-10-04 LAB
ALBUMIN SERPL-MCNC: 2.8 G/DL (ref 3.5–5.2)
ALBUMIN/GLOB SERPL: 1.2 G/DL
ALP SERPL-CCNC: 95 U/L (ref 39–117)
ALT SERPL W P-5'-P-CCNC: 7 U/L (ref 1–33)
ANION GAP SERPL CALCULATED.3IONS-SCNC: 5 MMOL/L (ref 5–15)
AST SERPL-CCNC: 8 U/L (ref 1–32)
BASOPHILS # BLD AUTO: 0.01 10*3/MM3 (ref 0–0.2)
BASOPHILS NFR BLD AUTO: 0.1 % (ref 0–1.5)
BILIRUB SERPL-MCNC: <0.2 MG/DL (ref 0–1.2)
BUN SERPL-MCNC: 19 MG/DL (ref 8–23)
BUN/CREAT SERPL: 27.1 (ref 7–25)
CALCIUM SPEC-SCNC: 8.8 MG/DL (ref 8.6–10.5)
CHLORIDE SERPL-SCNC: 94 MMOL/L (ref 98–107)
CO2 SERPL-SCNC: 35 MMOL/L (ref 22–29)
CREAT SERPL-MCNC: 0.7 MG/DL (ref 0.57–1)
DEPRECATED RDW RBC AUTO: 44.8 FL (ref 37–54)
EGFRCR SERPLBLD CKD-EPI 2021: 97.3 ML/MIN/1.73
EOSINOPHIL # BLD AUTO: 0 10*3/MM3 (ref 0–0.4)
EOSINOPHIL NFR BLD AUTO: 0 % (ref 0.3–6.2)
ERYTHROCYTE [DISTWIDTH] IN BLOOD BY AUTOMATED COUNT: 15.5 % (ref 12.3–15.4)
GLOBULIN UR ELPH-MCNC: 2.3 GM/DL
GLUCOSE BLDC GLUCOMTR-MCNC: 250 MG/DL (ref 70–130)
GLUCOSE BLDC GLUCOMTR-MCNC: 261 MG/DL (ref 70–130)
GLUCOSE BLDC GLUCOMTR-MCNC: 306 MG/DL (ref 70–130)
GLUCOSE BLDC GLUCOMTR-MCNC: 346 MG/DL (ref 70–130)
GLUCOSE SERPL-MCNC: 290 MG/DL (ref 65–99)
HCT VFR BLD AUTO: 33.8 % (ref 34–46.6)
HGB BLD-MCNC: 10.5 G/DL (ref 12–15.9)
IMM GRANULOCYTES # BLD AUTO: 0.03 10*3/MM3 (ref 0–0.05)
IMM GRANULOCYTES NFR BLD AUTO: 0.3 % (ref 0–0.5)
LYMPHOCYTES # BLD AUTO: 0.65 10*3/MM3 (ref 0.7–3.1)
LYMPHOCYTES NFR BLD AUTO: 6.1 % (ref 19.6–45.3)
MCH RBC QN AUTO: 24.8 PG (ref 26.6–33)
MCHC RBC AUTO-ENTMCNC: 31.1 G/DL (ref 31.5–35.7)
MCV RBC AUTO: 79.7 FL (ref 79–97)
MONOCYTES # BLD AUTO: 1.15 10*3/MM3 (ref 0.1–0.9)
MONOCYTES NFR BLD AUTO: 10.8 % (ref 5–12)
NEUTROPHILS NFR BLD AUTO: 8.83 10*3/MM3 (ref 1.7–7)
NEUTROPHILS NFR BLD AUTO: 82.7 % (ref 42.7–76)
NRBC BLD AUTO-RTO: 0 /100 WBC (ref 0–0.2)
PLATELET # BLD AUTO: 422 10*3/MM3 (ref 140–450)
PMV BLD AUTO: 9.4 FL (ref 6–12)
POTASSIUM SERPL-SCNC: 5.6 MMOL/L (ref 3.5–5.2)
PROT SERPL-MCNC: 5.1 G/DL (ref 6–8.5)
RBC # BLD AUTO: 4.24 10*6/MM3 (ref 3.77–5.28)
SODIUM SERPL-SCNC: 134 MMOL/L (ref 136–145)
WBC NRBC COR # BLD AUTO: 10.67 10*3/MM3 (ref 3.4–10.8)

## 2024-10-04 PROCEDURE — 99232 SBSQ HOSP IP/OBS MODERATE 35: CPT | Performed by: INTERNAL MEDICINE

## 2024-10-04 PROCEDURE — 94664 DEMO&/EVAL PT USE INHALER: CPT

## 2024-10-04 PROCEDURE — 77295 3-D RADIOTHERAPY PLAN: CPT | Performed by: RADIOLOGY

## 2024-10-04 PROCEDURE — 82948 REAGENT STRIP/BLOOD GLUCOSE: CPT

## 2024-10-04 PROCEDURE — 77387 GUIDANCE FOR RADJ TX DLVR: CPT | Performed by: RADIOLOGY

## 2024-10-04 PROCEDURE — 63710000001 INSULIN GLARGINE PER 5 UNITS: Performed by: HOSPITALIST

## 2024-10-04 PROCEDURE — 25010000002 FUROSEMIDE PER 20 MG: Performed by: NURSE PRACTITIONER

## 2024-10-04 PROCEDURE — 77334 RADIATION TREATMENT AID(S): CPT | Performed by: RADIOLOGY

## 2024-10-04 PROCEDURE — 94799 UNLISTED PULMONARY SVC/PX: CPT

## 2024-10-04 PROCEDURE — 80053 COMPREHEN METABOLIC PANEL: CPT | Performed by: HOSPITALIST

## 2024-10-04 PROCEDURE — 99232 SBSQ HOSP IP/OBS MODERATE 35: CPT | Performed by: HOSPITALIST

## 2024-10-04 PROCEDURE — 77412 RADIATION TX DELIVERY LVL 3: CPT | Performed by: RADIOLOGY

## 2024-10-04 PROCEDURE — 77290 THER RAD SIMULAJ FIELD CPLX: CPT | Performed by: RADIOLOGY

## 2024-10-04 PROCEDURE — 63710000001 INSULIN LISPRO (HUMAN) PER 5 UNITS: Performed by: INTERNAL MEDICINE

## 2024-10-04 PROCEDURE — 77332 RADIATION TREATMENT AID(S): CPT | Performed by: RADIOLOGY

## 2024-10-04 PROCEDURE — 25010000002 ENOXAPARIN PER 10 MG: Performed by: INTERNAL MEDICINE

## 2024-10-04 PROCEDURE — 77300 RADIATION THERAPY DOSE PLAN: CPT | Performed by: RADIOLOGY

## 2024-10-04 PROCEDURE — 99232 SBSQ HOSP IP/OBS MODERATE 35: CPT | Performed by: PHYSICIAN ASSISTANT

## 2024-10-04 PROCEDURE — 85025 COMPLETE CBC W/AUTO DIFF WBC: CPT | Performed by: HOSPITALIST

## 2024-10-04 RX ORDER — FUROSEMIDE 20 MG
20 TABLET ORAL DAILY
Status: DISCONTINUED | OUTPATIENT
Start: 2024-10-05 | End: 2024-10-10 | Stop reason: HOSPADM

## 2024-10-04 RX ORDER — DIPHENHYDRAMINE HYDROCHLORIDE AND LIDOCAINE HYDROCHLORIDE AND ALUMINUM HYDROXIDE AND MAGNESIUM HYDRO
5 KIT EVERY 6 HOURS
Status: DISCONTINUED | OUTPATIENT
Start: 2024-10-04 | End: 2024-10-10 | Stop reason: HOSPADM

## 2024-10-04 RX ADMIN — Medication 10 ML: at 08:27

## 2024-10-04 RX ADMIN — MIDODRINE HYDROCHLORIDE 5 MG: 5 TABLET ORAL at 13:06

## 2024-10-04 RX ADMIN — IPRATROPIUM BROMIDE AND ALBUTEROL SULFATE 3 ML: 2.5; .5 SOLUTION RESPIRATORY (INHALATION) at 19:47

## 2024-10-04 RX ADMIN — MIDODRINE HYDROCHLORIDE 5 MG: 5 TABLET ORAL at 17:38

## 2024-10-04 RX ADMIN — GUAIFENESIN 1200 MG: 600 TABLET, EXTENDED RELEASE ORAL at 08:25

## 2024-10-04 RX ADMIN — FUROSEMIDE 20 MG: 10 INJECTION, SOLUTION INTRAMUSCULAR; INTRAVENOUS at 00:27

## 2024-10-04 RX ADMIN — INSULIN LISPRO 6 UNITS: 100 INJECTION, SOLUTION INTRAVENOUS; SUBCUTANEOUS at 13:05

## 2024-10-04 RX ADMIN — INSULIN LISPRO 6 UNITS: 100 INJECTION, SOLUTION INTRAVENOUS; SUBCUTANEOUS at 21:18

## 2024-10-04 RX ADMIN — ENOXAPARIN SODIUM 40 MG: 100 INJECTION SUBCUTANEOUS at 08:25

## 2024-10-04 RX ADMIN — INSULIN GLARGINE 10 UNITS: 100 INJECTION, SOLUTION SUBCUTANEOUS at 08:25

## 2024-10-04 RX ADMIN — GUAIFENESIN 1200 MG: 600 TABLET, EXTENDED RELEASE ORAL at 21:18

## 2024-10-04 RX ADMIN — PANTOPRAZOLE SODIUM 40 MG: 40 TABLET, DELAYED RELEASE ORAL at 06:23

## 2024-10-04 RX ADMIN — DIPHENHYDRAMINE HYDROCHLORIDE AND LIDOCAINE HYDROCHLORIDE AND ALUMINUM HYDROXIDE AND MAGNESIUM HYDRO 5 ML: KIT at 22:09

## 2024-10-04 RX ADMIN — ASPIRIN 81 MG: 81 TABLET, COATED ORAL at 08:25

## 2024-10-04 RX ADMIN — CARVEDILOL 3.12 MG: 3.12 TABLET, FILM COATED ORAL at 17:38

## 2024-10-04 RX ADMIN — MIDODRINE HYDROCHLORIDE 5 MG: 5 TABLET ORAL at 06:22

## 2024-10-04 RX ADMIN — Medication 10 ML: at 21:21

## 2024-10-04 RX ADMIN — INSULIN LISPRO 7 UNITS: 100 INJECTION, SOLUTION INTRAVENOUS; SUBCUTANEOUS at 08:26

## 2024-10-04 RX ADMIN — IPRATROPIUM BROMIDE AND ALBUTEROL SULFATE 3 ML: 2.5; .5 SOLUTION RESPIRATORY (INHALATION) at 08:44

## 2024-10-04 RX ADMIN — CARVEDILOL 3.12 MG: 3.12 TABLET, FILM COATED ORAL at 08:25

## 2024-10-04 RX ADMIN — INSULIN LISPRO 8 UNITS: 100 INJECTION, SOLUTION INTRAVENOUS; SUBCUTANEOUS at 17:38

## 2024-10-04 RX ADMIN — INSULIN GLARGINE 10 UNITS: 100 INJECTION, SOLUTION SUBCUTANEOUS at 21:18

## 2024-10-04 NOTE — PROGRESS NOTES
"          Clinical Nutrition Assessment     Patient Name: Pati SIMON  YOB: 1960  MRN: [id number]  Date of Encounter: 10/04/24 10:57 EDT  Admission date: 2024  Reason for Visit: Follow-up protocol    Assessment   Nutrition Assessment   Admission Diagnosis:  Pneumonia [J18.9]    Problem List:    Mass of upper lobe of left lung    Type 2 diabetes mellitus with hyperglycemia, without long-term current use of insulin    Hyperlipidemia LDL goal <70    HFrEF (heart failure with reduced ejection fraction)    Coronary artery disease involving native coronary artery of native heart without angina pectoris    NICM (nonischemic cardiomyopathy)      PMH:   She  has a past medical history of Diabetes mellitus, GERD (gastroesophageal reflux disease), Hypertension, Pneumonia, Urinary tract infection, and Visual impairment.    PSH:  She  has a past surgical history that includes  section and Bronchoscopy (N/A, 10/3/2024).    Applicable Nutrition History:       Anthropometrics     Height: Height: 170.2 cm (67\")  Last Filed Weight: Weight: 67.8 kg (149 lb 8 oz) (10/04/24 0356)  Method: Weight Method: Bed scale (pt. refused standing weight, wanted to sleep)  BMI: BMI (Calculated): 23.4    UBW:  170lbs per pt report  Weight change:  difficult to assess w/current wt data available   Weight       Weight (kg) Weight (lbs) Weight Method Visit Report   2023 77.021 kg  169 lb 12.8 oz   --    2023 73.029 kg  161 lb   --    2024 72.576 kg  160 lb  Stated       Nutrition Focused Physical Exam    Date:     Pt does not meet criteria for malnutrition diagnosis, at this time.      Subjective   Reported/Observed/Food/Nutrition Related History:     10/4  No intakes documented-pt tells me she is eating at baseline. Would like ONS.       Pt denies any nutrition concerns PTA, states eating well and cooks 3 meals daily for herself. Pt does not suspect unintended wt loss, state her wt fluctuates. Pt " denies chewing/swallowing difficulties. NKFA. Pt states she is hungry.     Current Nutrition Prescription   PO: Diet: Regular/House, Diabetic; Consistent Carbohydrate; Fluid Consistency: Thin (IDDSI 0)  Oral Nutrition Supplement:   Intake: Insufficient data    Assessment & Plan   Nutrition Diagnosis   Date: 9/30             Updated:    Problem Inadequate oral intake    Etiology Pending coronary CTA   Signs/Symptoms NPO   Status: Resolved    Goal / Objectives:   Nutrition to support treatment and Continue positive trend      Nutrition Intervention      Follow treatment progress, Care plan reviewed, Supplement provided    Boost GC (rachel) added w/dinner per pt preference    Monitoring/Evaluation:   Per protocol, PO intake, POC/GOC    Karoline Darby, MS,RD,LD  Time Spent:15

## 2024-10-04 NOTE — NURSING NOTE
Pt is alert and oriented. 3 L via NC. NSR. Pt had bronchoscopy yesterday and tolerated procedure well. Pt up ad darren. Bed is in lowest position, call light within reach, and no other requests at this time.

## 2024-10-04 NOTE — PROGRESS NOTES
Intensive Care Follow-up     Hospital:  LOS: 7 days   Ms. Pati SIMON, 64 y.o. female is followed for:   Mass of upper lobe of left lung            History of present illness:   64-year-old female with a past medical history significant for GERD, hypertension, diabetes mellitus. Who presented to the [facility] ER shortness of breath. She ended up having a large left upper lobe lung mass. Pulmonary consulted for possible biopsy. She is also had to have workup for coronary artery disease with a EF of 20%.       Subjective   Interval History:  Patient's breathing fairly stable today on 2 to 3 L nasal cannula post bronchoscopy yesterday.  No other acute complaints.             The patient's past medical, surgical and social history were reviewed and updated in Epic as appropriate.       Objective     Infusions:     Medications:  aspirin, 81 mg, Oral, Daily  carvedilol, 3.125 mg, Oral, BID With Meals  empagliflozin, 10 mg, Oral, Daily  enoxaparin, 40 mg, Subcutaneous, Daily  [START ON 10/5/2024] furosemide, 20 mg, Oral, Daily  guaiFENesin, 1,200 mg, Oral, Q12H  insulin glargine, 10 Units, Subcutaneous, Q12H  insulin lispro, 2-9 Units, Subcutaneous, 4x Daily AC & at Bedtime  ipratropium-albuterol, 3 mL, Nebulization, 4x Daily - RT  losartan, 25 mg, Oral, Daily  midodrine, 5 mg, Oral, TID AC  pantoprazole, 40 mg, Oral, Q AM  sodium chloride, 10 mL, Intravenous, Q12H      I reviewed the patient's medications.    Vital Sign Min/Max for last 24 hours  Temp  Min: 97.5 °F (36.4 °C)  Max: 98.5 °F (36.9 °C)   BP  Min: 90/46  Max: 119/60   Pulse  Min: 58  Max: 79   Resp  Min: 16  Max: 20   SpO2  Min: 92 %  Max: 99 %   Flow (L/min)  Min: 2  Max: 6       Input/Output for last 24 hour shift  10/03 0701 - 10/04 0700  In: 1860 [P.O.:60; I.V.:1800]  Out: -    S RR:  [8-12] 8  GENERAL : NAD, conversant  RESPIRATORY/THORAX : normal respiratory effort and no intercostal retractions, CTAB  CARDIOVASCULAR : Normal S1/S2,  RRR. no lower ext edema.  GASTROINTESTINAL : Soft, NT/ND. BS x 4 normoactive. No hepatosplenomegaly.  MUSCULOSKELETAL : No cyanosis, clubbing, or ischemia  NEUROLOGICAL: alert and oriented to person, place and time  PSYCHOLOGICAL : Appropriate affect    Results from last 7 days   Lab Units 10/04/24  0553 10/01/24  0445 09/30/24 0447   WBC 10*3/mm3 10.67 12.39* 14.35*   HEMOGLOBIN g/dL 10.5* 11.1* 10.7*   PLATELETS 10*3/mm3 422 339 420     Results from last 7 days   Lab Units 10/04/24  0553 10/03/24  0829 10/02/24  0404 10/01/24  0445 09/30/24 0447 09/29/24  0548   SODIUM mmol/L 134* 138 134* 133* 138 136   POTASSIUM mmol/L 5.6* 4.4 4.5 5.5* 4.7 5.1   CO2 mmol/L 35.0* 35.0* 33.0* 30.0* 35.0* 32.0*   BUN mg/dL 19 11 16 15 21 20   CREATININE mg/dL 0.70 0.37* 0.64 0.58 0.55* 0.63   MAGNESIUM mg/dL  --   --   --  2.0 1.7 1.8   PHOSPHORUS mg/dL  --   --   --   --  4.1 4.8*   GLUCOSE mg/dL 290* 170* 207* 102* 61* 144*     Estimated Creatinine Clearance: 88 mL/min (by C-G formula based on SCr of 0.7 mg/dL).          I reviewed the patient's new clinical results.  I reviewed the patient's new imaging results/reports including actual images and agree with reports.         Assessment & Plan   Impression        Mass of upper lobe of left lung    Type 2 diabetes mellitus with hyperglycemia, without long-term current use of insulin    Hyperlipidemia LDL goal <70    HFrEF (heart failure with reduced ejection fraction)    Coronary artery disease involving native coronary artery of native heart without angina pectoris    NICM (nonischemic cardiomyopathy)       Plan        - CT scan of the chest from 9/28/2024 showed a large left upper lobe lung mass.  - I personally reviewed the pts labs from this admission see above for more detail  - Echo report from 9/20/2024 showed a EF of 20%     -Bronchoscopy performed on 10/3/2024 with large obstructing left mainstem lung mass.  Biopsies were obtained currently pending.  -Will follow-up on  station 7 lymph node biopsy and endobronchial mass.  I went ahead and consulted radiation oncology, due to the fact that this will soon enough obstruct off the entire left lung.  Patient will also need medical oncology evaluation once the results are back  -Wean oxygen to saturation greater than 88%  -Continue nebs as needed  -At this point if there is no plans for emergent radiation the patient is okay to be discharged with oxygen, she is going to intermittently plug off the left lung until treatment is initiated but she is not to stay in the hospital for this as long as she has oxygen there is no issue with her discharge from a pulmonary standpoint.  I will follow-up all of her results and notified her of the results.  -Pulmonary will see again on Monday if patient still in the hospital otherwise we will notify the patient of the results by phone.      Guerline De La Cruz, DO  Pulmonary, Critical care and Sleep Medicine

## 2024-10-04 NOTE — CONSULTS
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 Acute respiratory failure with hypoxia and hypercapnia    2. Pneumonia of left upper lobe due to infectious organism    3. Acute kidney injury    4. Hyperglycemia    5. Severe persistent asthma with exacerbation    6. Endobronchial mass            BRIEF HISTORY:  Pati SIMON is a 64 y.o. female with history of tobacco abuse who presented to [facility] ED with concerns for progressive dyspnea, weakness, and bilateral lower extremity edema.  CTA chest 2024 did not show evidence of PE but does reveal a large central left hilar mass measuring 11 cm that partially encases the left upper lung pulmonary vasculature with suspected postobstructive changes in the left upper and left lower lung, appearance radiographically consistent with malignancy.  Additional findings include a 2.2 cm right hilar lymph node and bilateral pleural effusions, L>R.    Cardiology work up with echocardiogram 2024 shows severe systolic dysfunction and LVEF <20%.  She was diuresed with some improvement in her symptoms.  CT abdomen pelvis 10/2/2024 reveals indeterminate prominent/nodular left adrenal gland with no convincing evidence for metastatic disease otherwise identified.  MRI brain 10/3/2024 is without evidence of intracranial metastasis; however, 2 small round areas of enhancement within the left frontal calvarium appear indeterminant though bone metastasis is not excluded.  She was evaluated by medical oncology and pulmonology with recommendations for biopsy.    She underwent bronchoscopy 10/3/2024 with Dr. De La Cruz to obtain biopsies of the endobronchial mass and station 7 lymph node.  The  left mainstem bronchus was almost completely obstructed.  Pathology/cytology are pending.  From a symptom standpoint, she reports she is comfortable but is still having much dyspnea despite supplemental oxygen.  She denies chest pain or hemoptysis.  She denies headache, vision change, nausea, or change in strength or sensation.  Radiation oncology services have been consulted for consideration of palliative radiotherapy to the bulky left hilar tumor which appears clinically compatible with primary bronchogenic carcinoma.              Allergies   Allergen Reactions    Codeine Irritability     jittering    Lipitor [Atorvastatin] Myalgia    Rosuvastatin Nausea Only       Social History     Tobacco Use    Smoking status: Every Day     Current packs/day: 1.00     Average packs/day: 1 pack/day for 42.8 years (42.8 ttl pk-yrs)     Types: Cigarettes     Start date: 1/1/1982     Passive exposure: Never    Smokeless tobacco: Never   Vaping Use    Vaping status: Never Used   Substance Use Topics    Alcohol use: Not Currently     Alcohol/week: 1.0 standard drink of alcohol     Types: 1 Glasses of wine per week     Comment: rare    Drug use: Not Currently     Types: Marijuana     Comment: occ       Past Medical History:   Diagnosis Date    Diabetes mellitus     GERD (gastroesophageal reflux disease)     Hypertension     Pneumonia     Urinary tract infection     Visual impairment        family history includes Alcohol abuse in her paternal grandfather; Diabetes in her father, mother, paternal grandmother, and sister; Heart attack in her father and maternal grandfather; Heart attack (age of onset: 56) in her mother; Heart disease in her paternal grandfather; Stroke in her paternal grandmother; Suicidality in her maternal grandmother.     Past Surgical History:   Procedure Laterality Date    BRONCHOSCOPY N/A 10/3/2024    Procedure: BRONCHOSCOPY WITH ENDOBRONCHIAL ULTRASOUND;  Surgeon: Alirio De La Cruz DO;  Location:   [facility];  Service: Pulmonary;  Laterality: N/A;  EBUS scope removed with balloon intact.     SECTION      4        Review of Systems   Constitutional:  Positive for fatigue.   Respiratory:  Positive for shortness of breath.    Neurological:         Generalized weakness   Psychiatric/Behavioral:  The patient is nervous/anxious.    All other systems reviewed and are negative.          Objective   VITAL SIGNS:   Vitals:    10/04/24 0356 10/04/24 0725 10/04/24 0730 10/04/24 0844   BP: 90/46 106/56     BP Location: Left arm Left arm     Patient Position: Lying Lying     Pulse: 68 69 79 76   Resp: 16 20  16   Temp: 97.8 °F (36.6 °C) 98.5 °F (36.9 °C)     TempSrc: Oral Oral     SpO2: 94% 97%  96%   Weight: 67.8 kg (149 lb 8 oz)      Height:          KPS 80%            Physical Exam  Vitals and nursing note reviewed.   Constitutional:       General: She is not in acute distress.     Appearance: She is well-developed.      Comments: Sitting upright in bed in no apparent distress.   HENT:      Head: Normocephalic and atraumatic.   Eyes:      Conjunctiva/sclera: Conjunctivae normal.      Pupils: Pupils are equal, round, and reactive to light.   Neck:      Comments: No cervical or supraclavicular LAD.  Cardiovascular:      Rate and Rhythm: Normal rate and regular rhythm.      Heart sounds: No murmur heard.     No friction rub.   Pulmonary:      Effort: Pulmonary effort is normal. No respiratory distress.      Breath sounds: Normal breath sounds. No wheezing.      Comments: Diminished breath sounds throughout the left lung.   Right lung CTA.  Respirations even, unlabored, on supplemental O2.  Abdominal:      General: Bowel sounds are normal. There is no distension.      Palpations: Abdomen is soft. There is no mass.      Tenderness: There is no abdominal tenderness.   Musculoskeletal:         General: Normal range of motion.      Cervical back: Normal range of motion and neck supple.      Right lower leg: Edema  present.      Left lower leg: Edema present.   Lymphadenopathy:      Cervical: No cervical adenopathy.   Skin:     General: Skin is warm and dry.   Neurological:      Mental Status: She is alert and oriented to person, place, and time.   Psychiatric:         Behavior: Behavior normal.         Thought Content: Thought content normal.         Judgment: Judgment normal.            IMAGING:  Narrative & Impression   CT ANGIOGRAM CORONARY     Date of Exam: 9/30/2024 4:33 PM EDT     Indication: . Hypertension diabetes     Comparison: CT pulmonary angiogram 9/28/2024     Technique: Non-contrasted CT images of the chest were performed for calcium scoring purposes followed by CTA images of the chest after the uneventful intravenous administration of 64 mL Isovue-370. Reconstructed coronal and sagittal images were also   obtained. In addition, a 3-D volume rendered image was created for interpretation. Automated exposure control and iterative reconstruction methods were used.        Findings:  Hilum and Mediastinum: There is a right hilar lymph node measuring 2.1 x 2.2 cm. There is a left hilar mass encasing left upper lung pulmonary arteries abutting the fissure and extending to the pleura measuring 9.9 x 8.8 cm with heterogeneous   enhancement. There are suspected postobstructive changes in the left upper lung..     Lung Parenchyma and Pleura: There is additional consolidation at the left lung base. There is bibasilar compressive atelectasis with mild right and moderate left pleural effusion.     Upper Abdomen: No acute process.      Soft tissues: Unremarkable.     Osseous structures: No aggressive focal lytic or sclerotic osseous lesions.     IMPRESSION:  Impression:     1. Left hilar mass measuring up to 9.9 cm in size partially encasing left upper lung pulmonary vasculature with suspected postobstructive changes in the left upper and left lower lung. Findings are compatible with neoplasm.     2. Bilateral pleural  effusions left greater than right.     Please refer to CT Angiogram Coronary   Cardiology Interp report for information on cardiac structures.     Electronically Signed: My Jolley MD    9/30/2024 5:13 PM EDT    Workstation ID: [id number]     Narrative & Impression   CT ABDOMEN PELVIS W CONTRAST     Date of Exam: 10/2/2024 1:38 PM EDT     Indication: Lung mass concerning for malignancy.  Staging/looking for amenable biopsy if found.     Comparison: None available.     Technique: Axial CT images were obtained of the abdomen and pelvis following the uneventful intravenous administration of 85 mL Isovue-300. Reconstructed coronal and sagittal images were also obtained. Automated exposure control and iterative   construction methods were used.     Findings:     Liver: The liver is unremarkable in morphology. No focal liver lesion is seen. No biliary dilation is seen.     Gallbladder: Tiny calcified gallstone. Gallbladder is otherwise unremarkable     Pancreas: Unremarkable.     Spleen: Unremarkable.     Adrenal glands: Mildly prominent/nodular left adrenal gland versus adjacent prominent celiac ganglion.     Genitourinary tract: Multifocal cortical scarring within the right kidney. Kidneys are otherwise unremarkable. No hydronephrosis is seen. Visualized portions of the ureters and urinary bladder appear unremarkable. 4.2 cm complex lesion within the right   adnexa containing macroscopic fat, likely an ovarian dermoid.     Gastrointestinal tract: Infraumbilical hernia defect contains a loop of nonobstructed transverse colon. Large amount of stool throughout the colon no findings to suggest bowel obstruction.     Appendix: The appendix is not identified     Other findings: No free air or significant free fluid. No pathologically enlarged lymph nodes are seen. Vascular calcifications are present. The IVC is unremarkable.     Bones and soft tissues: No acute osseous lesion is identified. Superficial soft tissue  anasarca is present. Just below the umbilicus, there is a ventral hernia defect containing a nonobstructing loop of colon. Slightly inferior to this is an additional   ventral hernia defect containing only fat. Small fat-containing umbilical hernia also noted. Superficial soft tissue anasarca is seen.     Lung bases: Partially visualized left pleural effusion with left basilar atelectasis. Please refer to chest CTA from 9/28/2024.     IMPRESSION:  Impression:  1.No acute abnormality identified within the abdomen or pelvis.  2.Mildly prominent/nodular left adrenal gland versus adjacent prominent celiac ganglion.  3.Several ventral abdominal wall hernia defects, 1 of which contains nonobstructed transverse colon.  4.Large amount of stool throughout the colon.  5.4.2 cm complex lesion within the right adnexa containing macroscopic fat, likely an ovarian dermoid.  6.Cholelithiasis.  7.Partially visualized left pleural effusion with left basilar atelectasis. Please refer to chest CTA from 9/28/2024.        Electronically Signed: Davide Craig MD    10/2/2024 2:09 PM EDT    Workstation ID: [id number]     Narrative & Impression      MRI BRAIN W WO CONTRAST     Date of Exam: 10/3/2024 6:03 AM EDT     Indication: lung cancer staging.     Comparison: None available.     Technique:  Routine multiplanar/multisequence sequence images of the brain were obtained before and after the uneventful administration of 12 mL Multihance.        Findings:  No diffusion restriction. Major arterial flow voids appear intact. No mass effect, midline shift or abnormal extra-axial collection. There is mild bilateral maxillary sinus mucosal thickening and mild atelectasis of the right maxillary sinus. Trace left   mastoid effusion. Midline structures appear intact. Temporomandibular joints and parotid glands appear normal. Superficial soft tissues appear unremarkable. There are 2 small round areas of enhancement within the left frontal calvarium  on thick slice   postcontrast axial T1 sequence images 25-22 each measuring 9 mm diameter. These appear hyperintense on T2 imaging and isointense on T1 imaging. These may represent hemangiomas, but metastasis are possible. There are mild scattered patchy and small   rounded areas of FLAIR hyperintense signal within the cerebral white matter. Ventricular size and configuration is normal for age. No acute or chronic intracranial hemorrhage. No enhancing lesions in the brain or cerebellum.     IMPRESSION:  Impression:  1.No acute intracranial abnormality. No evidence of intracranial metastatic disease.  2.There are 2 small round areas of enhancement within the left frontal calvarium. These could represent hemangiomas, but metastasis are possible. Consider evaluation with bone scan. CT may provide more information regarding bony anatomy. If a PET/CT is   planned for this patient's work-up then inclusion of this portion of the head should be requested.  3.Mild chronic small vessel ischemic change.  4.Mild paranasal sinus mucosal disease.           Electronically Signed: Saeed Singh MD    10/3/2024 6:24 AM EDT    Workstation ID: [id number]         The following portions of the patient's history were reviewed and updated as appropriate: allergies, current medications, past family history, past medical history, past social history, past surgical history, and problem list.    Assessment      IMPRESSION:  Radiographic diagnosis of presumed primary bronchogenic carcinoma resulting in malignant airway obstruction.  This appears to be at least locally advanced stage disease with a bulky cT4 left hilar lesion, although metastatic disease is likely given additional findings concerning for pleural effusion, contralateral right hilar lymph node, and indeterminate left adrenal nodularity and left frontal calvarial lesions.  The patient underwent bronchoscopy with EBUS 10/3/2024 to establish tissue diagnosis.  Final  pathology/cytology is pending.  Whether this is consistent with non-small cell lung cancer versus small cell will help guide systemic treatment options.  Dr. Cheng is following.  Regardless of pathology, the patient will benefit from urgent, short course palliative radiation therapy to the central left hilar mass in an effort to provide some local tumor control, palliate symptoms, protect pulmonary function, and reduce risk of complete collapse of the left lung.  After a discussion of the potential risks/benefits, informed consent was obtained.  We discussed the palliative nature of our treatments, which are not curative in intent.  Following discharge, I anticipate the patient will benefit from PET/CT for staging and further work up of some of the aforementioned indeterminate findings noted on CT and MRI.      RECOMMENDATIONS:    CT simulation and treatment planning session in our department today, 10/4/2024.  Anticipate palliative external beam radiation therapy to a dose of 20 Gray in 5 fractions of 4 Gray each, delivered to the central left hilar mass.  Treatment days are as follows: 10/4/2024, 10/5/2024, 10/7/2024, 10/8/2024, 10/9/2024.  The patient should remain admitted at least until her pathology is returned, in the event that this is small cell and she would have her first cycle of chemotherapy initiated as an inpatient.  If pathology is not consistent with small cell and she is clinically stable/appropriate for discharge from the standpoint of her other providers, then any remaining radiation treatment can be delivered on an outpatient basis.            WOLF Villavicencio      Errors in dictation may reflect use of voice recognition software and not all errors in transcription may have been detected prior to signing.    Total time spent on encounter: 60 minutes with 20 minutes in face-to-face communication with the patient via telephone, and the remainder of the time spent in reviewing the relevant  history, records, available imaging, and for documentation.

## 2024-10-04 NOTE — PROGRESS NOTES
[facility] Medicine Services  PROGRESS NOTE    Patient Name: Pati SIMON  : 1960  MRN: [id number]    Date of Admission: 2024  Primary Care Physician: No primary care provider on file.    Subjective   Subjective     CC:  For shortness of breath    HPI:  No acute events over the night.  Patient was sleeping this morning.  Bronchoscopy results reviewed.  Concerning for left bronchus malignancy with pending results.  Radiology oncology consulted for palliative radiation.  Will be started today with for total of 5 sessions.  Patient need to be in the hospital to finish her radiation course.  Medical oncology updated.    Objective   Objective     Vital Signs:   Temp:  [97.3 °F (36.3 °C)-98.5 °F (36.9 °C)] 98.5 °F (36.9 °C)  Heart Rate:  [58-79] 76  Resp:  [16-20] 16  BP: ()/(46-75) 106/56  Flow (L/min):  [2-6] 3     Physical Exam:  General: Comfortable,   Head: Atraumatic and normocephalic  Eyes: No Icterus. No pallor  Ears:  Ears appear intact with no abnormalities noted  Throat: No oral lesions, no thrush  Neck: Supple, trachea midline  Lungs: Markedly diminished air entry left lung base.  Bilateral wheezing  Heart:  Normal S1 and S2, no murmur, no gallop, No JVD, 3+ lower extremity swelling  Abdomen:  Soft, no tenderness, no organomegaly, normal bowel sounds, no organomegaly  Extremities: pulses equal bilaterally  Skin: No bleeding, bruising or rash, normal skin turgor and elasticity  Neurologic: Cranial nerves appear intact with no evidence of facial asymmetry, normal motor and sensory functions in all 4 extremities  Psych: Alert and oriented x 3, normal mood    Results Reviewed:  LAB RESULTS:      Lab 10/04/24  0553 10/01/24  0445 24  0447 24  0548 24  0401   WBC 10.67 12.39* 14.35* 16.77* 16.19*   HEMOGLOBIN 10.5* 11.1* 10.7* 10.4* 10.2*   HEMATOCRIT 33.8* 36.3 35.4 34.1 32.9*   PLATELETS 422 339 420 428 428   NEUTROS ABS 8.83* 9.21* 10.43* 12.79*   --    IMMATURE GRANS (ABS) 0.03 0.05 0.07* 0.06*  --    LYMPHS ABS 0.65* 1.35 1.73 1.96  --    MONOS ABS 1.15* 1.72* 2.04* 1.92*  --    EOS ABS 0.00 0.04 0.05 0.02  --    MCV 79.7 81.9 81.9 81.2 81.0   CRP  --   --   --   --  4.30*   PROCALCITONIN  --   --   --   --  0.19         Lab 10/04/24  0553 10/03/24  0829 10/02/24  0404 10/01/24  0445 09/30/24  0447 09/29/24  0548 09/28/24  0401   SODIUM 134* 138 134* 133* 138 136 131*   POTASSIUM 5.6* 4.4 4.5 5.5* 4.7 5.1 4.5   CHLORIDE 94* 97* 94* 95* 97* 97* 94*   CO2 35.0* 35.0* 33.0* 30.0* 35.0* 32.0* 30.0*   ANION GAP 5.0 6.0 7.0 8.0 6.0 7.0 7.0   BUN 19 11 16 15 21 20 20   CREATININE 0.70 0.37* 0.64 0.58 0.55* 0.63 0.52*   EGFR 97.3 113.5 99.4 101.8 103.1 99.8 104.5   GLUCOSE 290* 170* 207* 102* 61* 144* 143*   CALCIUM 8.8 8.8 8.6 8.4* 8.8 9.4 9.2   MAGNESIUM  --   --   --  2.0 1.7 1.8  --    PHOSPHORUS  --   --   --   --  4.1 4.8*  --    HEMOGLOBIN A1C  --   --   --   --   --   --  12.10*         Lab 10/04/24  0553 10/01/24  0445 09/30/24  0447 09/29/24  0548   TOTAL PROTEIN 5.1* 5.1* 5.6* 6.3   ALBUMIN 2.8* 2.8* 3.2* 3.2*   GLOBULIN 2.3 2.3 2.4 3.1   ALT (SGPT) 7 16 24 32   AST (SGOT) 8 14 14 29   BILIRUBIN <0.2 0.2 0.2 <0.2   ALK PHOS 95 117 137* 158*         Lab 09/28/24  0401   PROBNP 8,460.0*         Lab 09/30/24  0447 09/28/24  0401   CHOLESTEROL 155 146   LDL CHOL  --  93   HDL CHOL  --  44   TRIGLYCERIDES 74 39               Brief Urine Lab Results       None            Microbiology Results Abnormal       Procedure Component Value - Date/Time    Blood Culture - Blood, Arm, Right [[id number]]  (Normal) Collected: 09/27/24 0601    Lab Status: Final result Specimen: Blood from Arm, Right Updated: 10/02/24 0645     Blood Culture No growth at 5 days    Narrative:      Less than seven (7) mL's of blood was collected.  Insufficient quantity may yield false negative results.    Blood Culture - Blood, Arm, Left [[id number]]  (Normal) Collected: 09/27/24 0459    Lab  Status: Final result Specimen: Blood from Arm, Left Updated: 10/02/24 0645     Blood Culture No growth at 5 days    Narrative:      Less than seven (7) mL's of blood was collected.  Insufficient quantity may yield false negative results.    MRSA Screen, PCR (Inpatient) - Swab, Nares [[id number]]  (Normal) Collected: 09/28/24 1157    Lab Status: Final result Specimen: Swab from Nares Updated: 09/28/24 1413     MRSA PCR Negative    Narrative:      The negative predictive value of this diagnostic test is high and should only be used to consider de-escalating anti-MRSA therapy. A positive result may indicate colonization with MRSA and must be correlated clinically.  MRSA Negative    S. Pneumo Ag Urine or CSF - Urine, Urine, Clean Catch [[id number]]  (Normal) Collected: 09/27/24 1258    Lab Status: Final result Specimen: Urine, Clean Catch Updated: 09/27/24 1909     Strep Pneumo Ag Negative    Legionella Antigen, Urine - Urine, Urine, Clean Catch [[id number]]  (Normal) Collected: 09/27/24 1258    Lab Status: Final result Specimen: Urine, Clean Catch Updated: 09/27/24 1908     LEGIONELLA ANTIGEN, URINE Negative    Narrative:      2025-09-26    Respiratory Panel PCR w/COVID-19(SARS-CoV-2) ARNAV/NAIMA/AUNG/PAD/COR/JE In-House, NP Swab in UTM/VTM, 2 HR TAT - Swab, Nasopharynx [[id number]]  (Normal) Collected: 09/27/24 0524    Lab Status: Final result Specimen: Swab from Nasopharynx Updated: 09/27/24 0621     ADENOVIRUS, PCR Not Detected     Coronavirus 229E Not Detected     Coronavirus HKU1 Not Detected     Coronavirus NL63 Not Detected     Coronavirus OC43 Not Detected     COVID19 Not Detected     Human Metapneumovirus Not Detected     Human Rhinovirus/Enterovirus Not Detected     Influenza A PCR Not Detected     Influenza B PCR Not Detected     Parainfluenza Virus 1 Not Detected     Parainfluenza Virus 2 Not Detected     Parainfluenza Virus 3 Not Detected     Parainfluenza Virus 4 Not Detected     RSV, PCR Not Detected      Bordetella pertussis pcr Not Detected     Bordetella parapertussis PCR Not Detected     Chlamydophila pneumoniae PCR Not Detected     Mycoplasma pneumo by PCR Not Detected    Narrative:      In the setting of a positive respiratory panel with a viral infection PLUS a negative procalcitonin without other underlying concern for bacterial infection, consider observing off antibiotics or discontinuation of antibiotics and continue supportive care. If the respiratory panel is positive for atypical bacterial infection (Bordetella pertussis, Chlamydophila pneumoniae, or Mycoplasma pneumoniae), consider antibiotic de-escalation to target atypical bacterial infection.            MRI Brain With & Without Contrast    Result Date: 10/3/2024  MRI BRAIN W WO CONTRAST Date of Exam: 10/3/2024 6:03 AM EDT Indication: lung cancer staging.  Comparison: None available. Technique:  Routine multiplanar/multisequence sequence images of the brain were obtained before and after the uneventful administration of 12 mL Multihance. Findings: No diffusion restriction. Major arterial flow voids appear intact. No mass effect, midline shift or abnormal extra-axial collection. There is mild bilateral maxillary sinus mucosal thickening and mild atelectasis of the right maxillary sinus. Trace left mastoid effusion. Midline structures appear intact. Temporomandibular joints and parotid glands appear normal. Superficial soft tissues appear unremarkable. There are 2 small round areas of enhancement within the left frontal calvarium on thick slice postcontrast axial T1 sequence images 25-22 each measuring 9 mm diameter. These appear hyperintense on T2 imaging and isointense on T1 imaging. These may represent hemangiomas, but metastasis are possible. There are mild scattered patchy and small rounded areas of FLAIR hyperintense signal within the cerebral white matter. Ventricular size and configuration is normal for age. No acute or chronic intracranial  hemorrhage. No enhancing lesions in the brain or cerebellum.     Impression: Impression: 1.No acute intracranial abnormality. No evidence of intracranial metastatic disease. 2.There are 2 small round areas of enhancement within the left frontal calvarium. These could represent hemangiomas, but metastasis are possible. Consider evaluation with bone scan. CT may provide more information regarding bony anatomy. If a PET/CT is planned for this patient's work-up then inclusion of this portion of the head should be requested. 3.Mild chronic small vessel ischemic change. 4.Mild paranasal sinus mucosal disease. Electronically Signed: Saeed Singh MD  10/3/2024 6:24 AM EDT  Workstation ID: [id number]    CT Abdomen Pelvis With Contrast    Result Date: 10/2/2024  CT ABDOMEN PELVIS W CONTRAST Date of Exam: 10/2/2024 1:38 PM EDT Indication: Lung mass concerning for malignancy.  Staging/looking for amenable biopsy if found. Comparison: None available. Technique: Axial CT images were obtained of the abdomen and pelvis following the uneventful intravenous administration of 85 mL Isovue-300. Reconstructed coronal and sagittal images were also obtained. Automated exposure control and iterative construction methods were used. Findings: Liver: The liver is unremarkable in morphology. No focal liver lesion is seen. No biliary dilation is seen. Gallbladder: Tiny calcified gallstone. Gallbladder is otherwise unremarkable Pancreas: Unremarkable. Spleen: Unremarkable. Adrenal glands: Mildly prominent/nodular left adrenal gland versus adjacent prominent celiac ganglion. Genitourinary tract: Multifocal cortical scarring within the right kidney. Kidneys are otherwise unremarkable. No hydronephrosis is seen. Visualized portions of the ureters and urinary bladder appear unremarkable. 4.2 cm complex lesion within the right adnexa containing macroscopic fat, likely an ovarian dermoid. Gastrointestinal tract: Infraumbilical hernia defect contains a  loop of nonobstructed transverse colon. Large amount of stool throughout the colon no findings to suggest bowel obstruction. Appendix: The appendix is not identified Other findings: No free air or significant free fluid. No pathologically enlarged lymph nodes are seen. Vascular calcifications are present. The IVC is unremarkable. Bones and soft tissues: No acute osseous lesion is identified. Superficial soft tissue anasarca is present. Just below the umbilicus, there is a ventral hernia defect containing a nonobstructing loop of colon. Slightly inferior to this is an additional ventral hernia defect containing only fat. Small fat-containing umbilical hernia also noted. Superficial soft tissue anasarca is seen. Lung bases: Partially visualized left pleural effusion with left basilar atelectasis. Please refer to chest CTA from 9/28/2024.     Impression: Impression: 1.No acute abnormality identified within the abdomen or pelvis. 2.Mildly prominent/nodular left adrenal gland versus adjacent prominent celiac ganglion. 3.Several ventral abdominal wall hernia defects, 1 of which contains nonobstructed transverse colon. 4.Large amount of stool throughout the colon. 5.4.2 cm complex lesion within the right adnexa containing macroscopic fat, likely an ovarian dermoid. 6.Cholelithiasis. 7.Partially visualized left pleural effusion with left basilar atelectasis. Please refer to chest CTA from 9/28/2024. Electronically Signed: Davide Craig MD  10/2/2024 2:09 PM EDT  Workstation ID: [id number]     Results for orders placed during the hospital encounter of 09/27/24    Adult Transthoracic Echo Complete W/ Cont if Necessary Per Protocol    Interpretation Summary    Left ventricular ejection fraction appears to be less than 20%.    The left ventricular cavity is mild to moderately dilated.    The left atrial cavity is mildly dilated.    Estimated right ventricular systolic pressure from tricuspid regurgitation is mildly elevated (38  mmHg).    There is a left pleural effusion. There is a right pleural effusion.      Current medications:  Scheduled Meds:aspirin, 81 mg, Oral, Daily  carvedilol, 3.125 mg, Oral, BID With Meals  [Held by provider] empagliflozin, 10 mg, Oral, Daily  enoxaparin, 40 mg, Subcutaneous, Daily  ezetimibe, 10 mg, Oral, Daily  furosemide, 20 mg, Intravenous, Q12H  guaiFENesin, 1,200 mg, Oral, Q12H  insulin glargine, 10 Units, Subcutaneous, Q12H  insulin lispro, 2-9 Units, Subcutaneous, 4x Daily AC & at Bedtime  ipratropium-albuterol, 3 mL, Nebulization, 4x Daily - RT  [Held by provider] losartan, 25 mg, Oral, Daily  midodrine, 5 mg, Oral, TID AC  pantoprazole, 40 mg, Oral, Q AM  sodium chloride, 10 mL, Intravenous, Q12H      Continuous Infusions:lactated ringers, 9 mL/hr, Last Rate: 9 mL/hr (10/03/24 1446)        PRN Meds:.  acetaminophen **OR** acetaminophen **OR** acetaminophen    senna-docusate sodium **AND** polyethylene glycol **AND** bisacodyl **AND** bisacodyl    Calcium Replacement - Follow Nurse / BPA Driven Protocol    cyclobenzaprine    dextrose    dextrose    glucagon (human recombinant)    Magnesium Standard Dose Replacement - Follow Nurse / BPA Driven Protocol    ondansetron ODT **OR** ondansetron    Phosphorus Replacement - Follow Nurse / BPA Driven Protocol    Potassium Replacement - Follow Nurse / BPA Driven Protocol    sodium chloride    sodium chloride    sodium chloride    Assessment & Plan   Assessment & Plan     Active Hospital Problems    Diagnosis  POA    **Mass of upper lobe of left lung [R91.8]  Yes    NICM (nonischemic cardiomyopathy) [I42.8]  Yes    Coronary artery disease involving native coronary artery of native heart without angina pectoris [I25.10]  Yes    HFrEF (heart failure with reduced ejection fraction) [I50.20]  Yes    Type 2 diabetes mellitus with hyperglycemia, without long-term current use of insulin [E11.65]  Yes    Hyperlipidemia LDL goal <70 [E78.5]  Yes      Resolved Hospital  "Problems    Diagnosis Date Resolved POA    Pneumonia [J18.9] 09/28/2024 Yes    Acute respiratory failure with hypoxia [J96.01] 09/29/2024 Yes    Sepsis [A41.9] 09/28/2024 Yes    Demand ischemia [I24.89] 10/02/2024 Yes    Lactic acid increased [E87.20] 10/02/2024 Yes    Primary hypertension [I10] 10/01/2024 Yes        Brief Hospital Course to date:  Pati SIMON is a 64 y.o. female presenting to ED with SOA. Says she started to feel ill about 1 week ago at which time \"felt like she had a cold going on.\" Over the week continued to feel weaker. Developed SOA which hit suddenly late last night prompting her to come to ED. Feels better now after receiving tx in ED. Denies cough, high fevers.    Acute decompensated systolic heart failure, improved   cardiomyopathy likely ischemic  Elevated troponin, likely demand ischemia  Patient with symptoms suggestive of congestive heart failure.  She reported that she has been having worsening shortness of breath and lower extremity swelling x 2 weeks  Bedside point-of-care ultrasound concerning for severely reduced left ventricular function.    Formal echo with severe systolic dysfunction, EF less than 20%  Status post IV Lasix diuresis with improvement of her dyspnea  CT coronary angiogram coronary calcium score 367.3. Moderate CAD involving LAD. CAD also noted in left main, circumflex and RCA. Medical management recommended   GDMT adjustments per cardiology.    Hypotension  In the view of newly diagnosed systolic heart failure  Continue midodrine  Will be limiting factor to escalate systolic heart failure goal-directed therapy    Newly diagnosed left lung neoplasm.  Stage IV?  CTA chest showed left upper lung neoplasm concerning for malignancy  Pulmonary consulted. Plan was for CT-guided biopsy.   Per IR lung mass is not amenable to biopsy.  CT abdomen pelvis with contrast negative for metastasis  Oncology consulted. Recommendations pending biopsy results.  SP bronch on 10/03: " Per Dr. De La Cruz's note's:  - Bronchoscopy showed almost complete obstruction of the left mainstem bronchus.  Biopsies were obtained.  Patient is already being followed by oncology we will also go ahead and consult radiation oncology, suspect palliative radiation will be needed otherwise patient will collapse of the left lung.  Continue with airway clearance mechanisms.      Bronchoscopy results reviewed.  Concerning for left bronchus malignancy with pending results.  Radiology oncology consulted for palliative radiation.  Will be started today with for total of 5 sessions.  Patient need to be in the hospital to finish her radiation course.  Medical oncology updated.      Possible community-acquired/obstructive pneumonia   Leukocytosis   Even though the patient does not have any convincing signs of pneumonia, she does have leukocytosis and some infiltrates in the left upper lung lobe  Continue IV Rocephin and doxycycline total of 5 days  Continue DuoNebs      Uncontrolled DM w/ hyperglycemia with episodes of hypoglycemia  A1c 7.9%  Episodes of hypoglycemia. Resolved  resume long-acting insulin.  Hold Jardiance.   continue sliding scale       Essential hypertension  Dyslipidemia  Blood pressure is borderline.   Blood pressure management per cardiology.    Expected Discharge Location and Transportation: Rehab once 5 days of radiation therapy finished.  Expected Discharge   Expected Discharge Date: 10/4/2024; Expected Discharge Time:      VTE Prophylaxis:  Pharmacologic VTE prophylaxis orders are present.         AM-PAC 6 Clicks Score (PT): 23 (10/03/24 0800)    CODE STATUS:   Code Status and Medical Interventions: CPR (Attempt to Resuscitate); Full Support   Ordered at: 09/27/24 0713     Code Status (Patient has no pulse and is not breathing):    CPR (Attempt to Resuscitate)     Medical Interventions (Patient has pulse or is breathing):    Full Support     Copied text in this note has been reviewed and is accurate as  of 10/04/24.     Romana Whiting MD  10/04/24

## 2024-10-04 NOTE — PROGRESS NOTES
Cardiology Progress Note      Reason for visit:    Acute systolic heart failure  Coronary artery disease    IDENTIFICATION: 64-year-old female who resides in [place], [place]    Active Hospital Problems    Diagnosis  POA    **Mass of upper lobe of left lung [R91.8]  Yes    NICM (nonischemic cardiomyopathy) [I42.8]  Yes     Echo (9/20/2024): LVEF 20%  Coronary angiogram (9/30/2024): Mild to moderate CAD.  CAD burden does not account for degree of LV dysfunction      Coronary artery disease involving native coronary artery of native heart without angina pectoris [I25.10]  Yes     CT coronary angiogram (9/30/2024): Mild to moderate CAD.  CAD burden does not account for degree of LV dysfunction.      HFrEF (heart failure with reduced ejection fraction) [I50.20]  Yes     Echo (9/20/2024): LVEF 20%.  Mildly dilated left atrium.  Mild MR.  CT coronary angiogram (9/30/2024): Mild to moderate CAD.  CAD burden does not account for degree of LV dysfunction.      Type 2 diabetes mellitus with hyperglycemia, without long-term current use of insulin [E11.65]  Yes    Hyperlipidemia LDL goal <70 [E78.5]  Yes     Statin therapy indicated due to the presence of CAD  Intolerant to atorvastatin and rosuvastatin              Patient feels well this morning.  No events overnight.           Vital Sign Min/Max for last 24 hours  Temp  Min: 97.3 °F (36.3 °C)  Max: 98.5 °F (36.9 °C)   BP  Min: 90/46  Max: 119/60   Pulse  Min: 58  Max: 79   Resp  Min: 16  Max: 20   SpO2  Min: 92 %  Max: 99 %   Flow (L/min)  Min: 2  Max: 6      Intake/Output Summary (Last 24 hours) at 10/4/2024 1029  Last data filed at 10/3/2024 1600  Gross per 24 hour   Intake 1860 ml   Output --   Net 1860 ml           Physical Exam  Constitutional:       General: She is awake.   Cardiovascular:      Rate and Rhythm: Normal rate and regular rhythm.      Heart sounds: No murmur heard.  Pulmonary:      Effort: Pulmonary effort is normal.      Breath sounds: Decreased breath  sounds present.   Skin:     General: Skin is warm and dry.   Neurological:      Mental Status: She is alert and oriented to person, place, and time.   Psychiatric:         Behavior: Behavior is cooperative.           Tele: Sinus rhythm      Results Review (reviewed the patient's recent labs in the electronic medical record):      EKG (9/27/2024): Sinus tachycardia with frequent PVCs     CT pulmonary/coronary angiogram (9/30/2024): Left hilar mass 9.9 cm in size encasing left upper lung.  Pulmonary vascular with suspected postobstructive changes in left upper and left lower lung compatible with neoplasm.  Bilateral pleural effusions left greater than right.     Coronary CT angiogram (9/30/2024): Coronary portion shows coronary calcium score of 367.3.  Moderate CAD involving LAD.  Mildly reduced LVEF 43%.     ECHO (9/28/2024): LVEF less than 20%.  Bilateral pleural effusions.  RVSP 38 mmHg    Results from last 7 days   Lab Units 10/04/24  0553 10/03/24  0829 10/02/24  0404 10/01/24  0445 09/30/24  0447 09/29/24  0548 09/28/24  0401   SODIUM mmol/L 134* 138 134* 133* 138 136 131*   POTASSIUM mmol/L 5.6* 4.4 4.5 5.5* 4.7 5.1 4.5   CHLORIDE mmol/L 94* 97* 94* 95* 97* 97* 94*   BUN mg/dL 19 11 16 15 21 20 20   CREATININE mg/dL 0.70 0.37* 0.64 0.58 0.55* 0.63 0.52*   MAGNESIUM mg/dL  --   --   --  2.0 1.7 1.8  --            Results from last 7 days   Lab Units 10/04/24  0553 10/01/24  0445 09/30/24  0447   WBC 10*3/mm3 10.67 12.39* 14.35*   HEMOGLOBIN g/dL 10.5* 11.1* 10.7*   HEMATOCRIT % 33.8* 36.3 35.4   PLATELETS 10*3/mm3 422 339 420       Lab Results   Component Value Date    HGBA1C 12.10 (H) 09/28/2024       Lab Results   Component Value Date    CHOL 155 09/30/2024    TRIG 74 09/30/2024    HDL 44 09/28/2024    LDL 93 09/28/2024              Acute systolic heart failure  LVEF about 20%  CT coronary angiogram shows no obstructive disease of proximal vessels to account for LV dysfunction  Patient appears  compensated  Carvedilol 3.25 mg twice daily  Losartan 25 mg daily  Jardiance 10 mg daily  Transition to p.o. Lasix 20 mg daily 10/5.  Will continue to adjust as needed          Coronary artery disease  No obstructive CAD of proximal vessels  Medical therapy recommended, including antiplatelet, statin, beta-blocker        Hypotension  Improved with midodrine at 5 mg 3 times daily.       Hyperlipidemia with target LDL <70  Patient refuses statins reports intolerance  Zetia 10 mg daily at home, does not have supply here.  Would benefit from PCSK9 inhibitor initiation at discharge        Type 2 diabetes mellitus not on insulin with hyperglycemia  Continue empagliflozin for HFrEF and DM     Left lung mass concerning for invasive cancer  Management per hospitalistist, oncology and pulmonology  Patient is increased but acceptable cardiovascular risk to undergo general anesthesia for bronchoscopy                Transition to Lasix 20 mg daily starting tomorrow.  Patient did receive IV Lasix early this morning.  Patient receiving carvedilol 3.125, losartan and Jardiance currently held due to patient's hypotension.  Can start adding back once blood pressure equalizes.  Defer LifeVest due to likely having stage IV lung carcinoma.  Patient stable from a cardiac perspective at this time.  Will follow as needed over the weekend see patient again on Monday if patient remains inpatient, please do not hesitate to call us.  Can follow-up with heart and valve within a week of discharge and in 6 to 8 weeks with Delaney Gomez as needed.    Electronically signed by Allegra Gilliam PA-C, 10/04/24, 10:39 AM EDT.

## 2024-10-04 NOTE — CASE MANAGEMENT/SOCIAL WORK
Continued Stay Note  [facility]     Patient Name: Pati VAN  MRN: [id number]  Today's Date: 10/4/2024    Admit Date: 9/27/2024    Plan: Home   Discharge Plan       Row Name 10/04/24 1521       Plan    Plan Home    Patient/Family in Agreement with Plan yes    Plan Comments Ms. Van is not being discharged today. She might be a discharge over the weekend. Therapy recommends home with assist. No discharge needs noted.  CM will continue to follow.    Final Discharge Disposition Code 01 - home or self-care                   Discharge Codes    No documentation.                 Expected Discharge Date and Time       Expected Discharge Date Expected Discharge Time    Oct 4, 2024               Rozina Cosby RN

## 2024-10-05 LAB
GLUCOSE BLDC GLUCOMTR-MCNC: 157 MG/DL (ref 70–130)
GLUCOSE BLDC GLUCOMTR-MCNC: 167 MG/DL (ref 70–130)
GLUCOSE BLDC GLUCOMTR-MCNC: 257 MG/DL (ref 70–130)
GLUCOSE BLDC GLUCOMTR-MCNC: 78 MG/DL (ref 70–130)

## 2024-10-05 PROCEDURE — 94799 UNLISTED PULMONARY SVC/PX: CPT

## 2024-10-05 PROCEDURE — 94761 N-INVAS EAR/PLS OXIMETRY MLT: CPT

## 2024-10-05 PROCEDURE — 63710000001 INSULIN GLARGINE PER 5 UNITS: Performed by: HOSPITALIST

## 2024-10-05 PROCEDURE — 25010000002 ENOXAPARIN PER 10 MG: Performed by: INTERNAL MEDICINE

## 2024-10-05 PROCEDURE — 82948 REAGENT STRIP/BLOOD GLUCOSE: CPT

## 2024-10-05 PROCEDURE — 77412 RADIATION TX DELIVERY LVL 3: CPT | Performed by: RADIOLOGY

## 2024-10-05 PROCEDURE — 99232 SBSQ HOSP IP/OBS MODERATE 35: CPT | Performed by: HOSPITALIST

## 2024-10-05 PROCEDURE — 77387 GUIDANCE FOR RADJ TX DLVR: CPT | Performed by: RADIOLOGY

## 2024-10-05 PROCEDURE — 63710000001 INSULIN LISPRO (HUMAN) PER 5 UNITS: Performed by: INTERNAL MEDICINE

## 2024-10-05 PROCEDURE — 94664 DEMO&/EVAL PT USE INHALER: CPT

## 2024-10-05 RX ORDER — GLIPIZIDE 10 MG/1
5 TABLET ORAL
Status: DISCONTINUED | OUTPATIENT
Start: 2024-10-05 | End: 2024-10-10 | Stop reason: HOSPADM

## 2024-10-05 RX ADMIN — MIDODRINE HYDROCHLORIDE 5 MG: 5 TABLET ORAL at 08:39

## 2024-10-05 RX ADMIN — DIPHENHYDRAMINE HYDROCHLORIDE AND LIDOCAINE HYDROCHLORIDE AND ALUMINUM HYDROXIDE AND MAGNESIUM HYDRO 5 ML: KIT at 08:40

## 2024-10-05 RX ADMIN — GUAIFENESIN 1200 MG: 600 TABLET, EXTENDED RELEASE ORAL at 21:26

## 2024-10-05 RX ADMIN — EMPAGLIFLOZIN 10 MG: 10 TABLET, FILM COATED ORAL at 08:39

## 2024-10-05 RX ADMIN — INSULIN LISPRO 2 UNITS: 100 INJECTION, SOLUTION INTRAVENOUS; SUBCUTANEOUS at 08:38

## 2024-10-05 RX ADMIN — GUAIFENESIN 1200 MG: 600 TABLET, EXTENDED RELEASE ORAL at 08:39

## 2024-10-05 RX ADMIN — IPRATROPIUM BROMIDE AND ALBUTEROL SULFATE 3 ML: 2.5; .5 SOLUTION RESPIRATORY (INHALATION) at 16:45

## 2024-10-05 RX ADMIN — Medication 10 ML: at 08:39

## 2024-10-05 RX ADMIN — DIPHENHYDRAMINE HYDROCHLORIDE AND LIDOCAINE HYDROCHLORIDE AND ALUMINUM HYDROXIDE AND MAGNESIUM HYDRO 5 ML: KIT at 17:12

## 2024-10-05 RX ADMIN — IPRATROPIUM BROMIDE AND ALBUTEROL SULFATE 3 ML: 2.5; .5 SOLUTION RESPIRATORY (INHALATION) at 13:11

## 2024-10-05 RX ADMIN — METFORMIN HYDROCHLORIDE 1000 MG: 1000 TABLET ORAL at 12:27

## 2024-10-05 RX ADMIN — INSULIN GLARGINE 10 UNITS: 100 INJECTION, SOLUTION SUBCUTANEOUS at 08:38

## 2024-10-05 RX ADMIN — PANTOPRAZOLE SODIUM 40 MG: 40 TABLET, DELAYED RELEASE ORAL at 05:04

## 2024-10-05 RX ADMIN — FUROSEMIDE 20 MG: 20 TABLET ORAL at 08:39

## 2024-10-05 RX ADMIN — LOSARTAN POTASSIUM 25 MG: 25 TABLET, FILM COATED ORAL at 08:39

## 2024-10-05 RX ADMIN — DIPHENHYDRAMINE HYDROCHLORIDE AND LIDOCAINE HYDROCHLORIDE AND ALUMINUM HYDROXIDE AND MAGNESIUM HYDRO 5 ML: KIT at 05:04

## 2024-10-05 RX ADMIN — METFORMIN HYDROCHLORIDE 1000 MG: 1000 TABLET ORAL at 17:11

## 2024-10-05 RX ADMIN — CARVEDILOL 3.12 MG: 3.12 TABLET, FILM COATED ORAL at 08:39

## 2024-10-05 RX ADMIN — ASPIRIN 81 MG: 81 TABLET, COATED ORAL at 08:39

## 2024-10-05 RX ADMIN — INSULIN LISPRO 6 UNITS: 100 INJECTION, SOLUTION INTRAVENOUS; SUBCUTANEOUS at 17:12

## 2024-10-05 RX ADMIN — MIDODRINE HYDROCHLORIDE 5 MG: 5 TABLET ORAL at 12:27

## 2024-10-05 RX ADMIN — MIDODRINE HYDROCHLORIDE 5 MG: 5 TABLET ORAL at 17:12

## 2024-10-05 RX ADMIN — DIPHENHYDRAMINE HYDROCHLORIDE AND LIDOCAINE HYDROCHLORIDE AND ALUMINUM HYDROXIDE AND MAGNESIUM HYDRO 5 ML: KIT at 21:26

## 2024-10-05 RX ADMIN — CARVEDILOL 3.12 MG: 3.12 TABLET, FILM COATED ORAL at 17:12

## 2024-10-05 RX ADMIN — ENOXAPARIN SODIUM 40 MG: 100 INJECTION SUBCUTANEOUS at 08:39

## 2024-10-05 RX ADMIN — IPRATROPIUM BROMIDE AND ALBUTEROL SULFATE 3 ML: 2.5; .5 SOLUTION RESPIRATORY (INHALATION) at 07:06

## 2024-10-05 RX ADMIN — INSULIN LISPRO 2 UNITS: 100 INJECTION, SOLUTION INTRAVENOUS; SUBCUTANEOUS at 12:27

## 2024-10-05 RX ADMIN — INSULIN GLARGINE 10 UNITS: 100 INJECTION, SOLUTION SUBCUTANEOUS at 21:26

## 2024-10-05 RX ADMIN — GLIPIZIDE 5 MG: 10 TABLET ORAL at 17:12

## 2024-10-05 NOTE — NURSING NOTE
Pt is alert and oriented. 2 L NC, NSR. Pt is scheduled for radiology treatments for 10/5, 10/7, 10/8, 10/9. Pt is up ad darren. Bed is in lowest position, call light within reach, and no other requests at this time.

## 2024-10-05 NOTE — PROGRESS NOTES
[facility] Medicine Services  PROGRESS NOTE    Patient Name: Pati SIMON  : 1960  MRN: [id number]    Date of Admission: 2024  Primary Care Physician: No primary care provider on file.    Subjective   Subjective     CC:  For shortness of breath    HPI:  No acute events over the night.  Patient received her first radiation treatment yesterday.  Tolerated well.  She is on 2 L of nasal cannula.  Satting around 98%.  Plan to continue 5 session of radiation as inpatient.  Biopsy results still pending.  Objective   Objective     Vital Signs:   Temp:  [96.4 °F (35.8 °C)-98.3 °F (36.8 °C)] 96.4 °F (35.8 °C)  Heart Rate:  [65-80] 80  Resp:  [16-26] 18  BP: ()/(56-75) 123/75  Flow (L/min):  [2-3] 2     Physical Exam:  General: Comfortable,   Head: Atraumatic and normocephalic  Eyes: No Icterus. No pallor  Neck: Supple, trachea midline  Lungs: Markedly diminished air entry left lung base.    Heart:  Normal S1 and S2, no murmur, no gallop, No JVD, 1+ lower extremity swelling  Abdomen:  Soft, no tenderness, no organomegaly, normal bowel sounds, no organomegaly  Extremities: pulses equal bilaterally  Skin: No bleeding, bruising or rash, normal skin turgor and elasticity  Neurologic: Cranial nerves appear intact with no evidence of facial asymmetry, normal motor and sensory functions in all 4 extremities  Psych: Alert and oriented x 3, normal mood    Results Reviewed:  LAB RESULTS:      Lab 10/04/24  0553 10/01/24  0445 24  0447 24  0548   WBC 10.67 12.39* 14.35* 16.77*   HEMOGLOBIN 10.5* 11.1* 10.7* 10.4*   HEMATOCRIT 33.8* 36.3 35.4 34.1   PLATELETS 422 339 420 428   NEUTROS ABS 8.83* 9.21* 10.43* 12.79*   IMMATURE GRANS (ABS) 0.03 0.05 0.07* 0.06*   LYMPHS ABS 0.65* 1.35 1.73 1.96   MONOS ABS 1.15* 1.72* 2.04* 1.92*   EOS ABS 0.00 0.04 0.05 0.02   MCV 79.7 81.9 81.9 81.2         Lab 10/04/24  0553 10/03/24  0829 10/02/24  0404 10/01/24  0445 24  0447  09/29/24  0548   SODIUM 134* 138 134* 133* 138 136   POTASSIUM 5.6* 4.4 4.5 5.5* 4.7 5.1   CHLORIDE 94* 97* 94* 95* 97* 97*   CO2 35.0* 35.0* 33.0* 30.0* 35.0* 32.0*   ANION GAP 5.0 6.0 7.0 8.0 6.0 7.0   BUN 19 11 16 15 21 20   CREATININE 0.70 0.37* 0.64 0.58 0.55* 0.63   EGFR 97.3 113.5 99.4 101.8 103.1 99.8   GLUCOSE 290* 170* 207* 102* 61* 144*   CALCIUM 8.8 8.8 8.6 8.4* 8.8 9.4   MAGNESIUM  --   --   --  2.0 1.7 1.8   PHOSPHORUS  --   --   --   --  4.1 4.8*         Lab 10/04/24  0553 10/01/24  0445 09/30/24  0447 09/29/24  0548   TOTAL PROTEIN 5.1* 5.1* 5.6* 6.3   ALBUMIN 2.8* 2.8* 3.2* 3.2*   GLOBULIN 2.3 2.3 2.4 3.1   ALT (SGPT) 7 16 24 32   AST (SGOT) 8 14 14 29   BILIRUBIN <0.2 0.2 0.2 <0.2   ALK PHOS 95 117 137* 158*               Lab 09/30/24  0447   CHOLESTEROL 155   TRIGLYCERIDES 74               Brief Urine Lab Results       None            Microbiology Results Abnormal       Procedure Component Value - Date/Time    Blood Culture - Blood, Arm, Right [[id number]]  (Normal) Collected: 09/27/24 0601    Lab Status: Final result Specimen: Blood from Arm, Right Updated: 10/02/24 0645     Blood Culture No growth at 5 days    Narrative:      Less than seven (7) mL's of blood was collected.  Insufficient quantity may yield false negative results.    Blood Culture - Blood, Arm, Left [[id number]]  (Normal) Collected: 09/27/24 0459    Lab Status: Final result Specimen: Blood from Arm, Left Updated: 10/02/24 0645     Blood Culture No growth at 5 days    Narrative:      Less than seven (7) mL's of blood was collected.  Insufficient quantity may yield false negative results.    MRSA Screen, PCR (Inpatient) - Swab, Nares [[id number]]  (Normal) Collected: 09/28/24 1157    Lab Status: Final result Specimen: Swab from Nares Updated: 09/28/24 1413     MRSA PCR Negative    Narrative:      The negative predictive value of this diagnostic test is high and should only be used to consider de-escalating anti-MRSA therapy. A  positive result may indicate colonization with MRSA and must be correlated clinically.  MRSA Negative    S. Pneumo Ag Urine or CSF - Urine, Urine, Clean Catch [[id number]]  (Normal) Collected: 09/27/24 1258    Lab Status: Final result Specimen: Urine, Clean Catch Updated: 09/27/24 1909     Strep Pneumo Ag Negative    Legionella Antigen, Urine - Urine, Urine, Clean Catch [[id number]]  (Normal) Collected: 09/27/24 1258    Lab Status: Final result Specimen: Urine, Clean Catch Updated: 09/27/24 1908     LEGIONELLA ANTIGEN, URINE Negative    Narrative:      2025-09-26    Respiratory Panel PCR w/COVID-19(SARS-CoV-2) ARNAV/NAIMA/AUNG/PAD/COR/JE In-House, NP Swab in UTM/VTM, 2 HR TAT - Swab, Nasopharynx [[id number]]  (Normal) Collected: 09/27/24 0524    Lab Status: Final result Specimen: Swab from Nasopharynx Updated: 09/27/24 0621     ADENOVIRUS, PCR Not Detected     Coronavirus 229E Not Detected     Coronavirus HKU1 Not Detected     Coronavirus NL63 Not Detected     Coronavirus OC43 Not Detected     COVID19 Not Detected     Human Metapneumovirus Not Detected     Human Rhinovirus/Enterovirus Not Detected     Influenza A PCR Not Detected     Influenza B PCR Not Detected     Parainfluenza Virus 1 Not Detected     Parainfluenza Virus 2 Not Detected     Parainfluenza Virus 3 Not Detected     Parainfluenza Virus 4 Not Detected     RSV, PCR Not Detected     Bordetella pertussis pcr Not Detected     Bordetella parapertussis PCR Not Detected     Chlamydophila pneumoniae PCR Not Detected     Mycoplasma pneumo by PCR Not Detected    Narrative:      In the setting of a positive respiratory panel with a viral infection PLUS a negative procalcitonin without other underlying concern for bacterial infection, consider observing off antibiotics or discontinuation of antibiotics and continue supportive care. If the respiratory panel is positive for atypical bacterial infection (Bordetella pertussis, Chlamydophila pneumoniae, or Mycoplasma  pneumoniae), consider antibiotic de-escalation to target atypical bacterial infection.            No radiology results from the last 24 hrs    Results for orders placed during the hospital encounter of 09/27/24    Adult Transthoracic Echo Complete W/ Cont if Necessary Per Protocol    Interpretation Summary    Left ventricular ejection fraction appears to be less than 20%.    The left ventricular cavity is mild to moderately dilated.    The left atrial cavity is mildly dilated.    Estimated right ventricular systolic pressure from tricuspid regurgitation is mildly elevated (38 mmHg).    There is a left pleural effusion. There is a right pleural effusion.      Current medications:  Scheduled Meds:aspirin, 81 mg, Oral, Daily  carvedilol, 3.125 mg, Oral, BID With Meals  empagliflozin, 10 mg, Oral, Daily  enoxaparin, 40 mg, Subcutaneous, Daily  First Mouthwash (Magic Mouthwash), 5 mL, Swish & Spit, Q6H  furosemide, 20 mg, Oral, Daily  guaiFENesin, 1,200 mg, Oral, Q12H  insulin glargine, 10 Units, Subcutaneous, Q12H  insulin lispro, 2-9 Units, Subcutaneous, 4x Daily AC & at Bedtime  ipratropium-albuterol, 3 mL, Nebulization, 4x Daily - RT  losartan, 25 mg, Oral, Daily  midodrine, 5 mg, Oral, TID AC  pantoprazole, 40 mg, Oral, Q AM  sodium chloride, 10 mL, Intravenous, Q12H      Continuous Infusions:       PRN Meds:.  acetaminophen **OR** acetaminophen **OR** acetaminophen    senna-docusate sodium **AND** polyethylene glycol **AND** bisacodyl **AND** bisacodyl    Calcium Replacement - Follow Nurse / BPA Driven Protocol    cyclobenzaprine    dextrose    dextrose    glucagon (human recombinant)    Magnesium Standard Dose Replacement - Follow Nurse / BPA Driven Protocol    ondansetron ODT **OR** ondansetron    Phosphorus Replacement - Follow Nurse / BPA Driven Protocol    Potassium Replacement - Follow Nurse / BPA Driven Protocol    sodium chloride    sodium chloride    sodium chloride    Assessment & Plan   Assessment & Plan  "    Active Hospital Problems    Diagnosis  POA    **Mass of upper lobe of left lung [R91.8]  Yes    NICM (nonischemic cardiomyopathy) [I42.8]  Yes    Coronary artery disease involving native coronary artery of native heart without angina pectoris [I25.10]  Yes    HFrEF (heart failure with reduced ejection fraction) [I50.20]  Yes    Type 2 diabetes mellitus with hyperglycemia, without long-term current use of insulin [E11.65]  Yes    Hyperlipidemia LDL goal <70 [E78.5]  Yes      Resolved Hospital Problems    Diagnosis Date Resolved POA    Pneumonia [J18.9] 09/28/2024 Yes    Acute respiratory failure with hypoxia [J96.01] 09/29/2024 Yes    Sepsis [A41.9] 09/28/2024 Yes    Demand ischemia [I24.89] 10/02/2024 Yes    Lactic acid increased [E87.20] 10/02/2024 Yes    Primary hypertension [I10] 10/01/2024 Yes        Brief Hospital Course to date:  Pati SIMON is a 64 y.o. female presenting to ED with SOA. Says she started to feel ill about 1 week ago at which time \"felt like she had a cold going on.\" Over the week continued to feel weaker. Developed SOA which hit suddenly late last night prompting her to come to ED. Feels better now after receiving tx in ED. Denies cough, high fevers.    Copied text in this note has been reviewed and is accurate as of 10/05/24      Acute decompensated systolic heart failure, improved  Nonischemic cardiomyopathy   Elevated troponin, likely demand ischemia   echo with severe systolic dysfunction, EF less than 20%  Status post IV Lasix diuresis with improvement of her dyspnea  CT coronary angiogram coronary calcium score 367.3. Moderate CAD involving LAD. CAD also noted in left main, circumflex and RCA. Medical management recommended   GDMT adjustments per cardiology.    Hypotension  In the view of newly diagnosed systolic heart failure  Continue midodrine  Will be limiting factor to escalate systolic heart failure goal-directed therapy    Newly diagnosed left lung neoplasm.  Stage IV?  CTA " chest showed left upper lung neoplasm concerning for malignancy  Pulmonary consulted. Plan was for CT-guided biopsy.   Per IR lung mass is not amenable to biopsy.  CT abdomen pelvis with contrast negative for metastasis  Oncology consulted. Recommendations pending biopsy results.  SP bronch on 10/03: Per Dr. De La Cruz's note's:  - Bronchoscopy showed almost complete obstruction of the left mainstem bronchus.  Biopsies were obtained.  Patient is already being followed by oncology we will also go ahead and consult radiation oncology, suspect palliative radiation will be needed otherwise patient will collapse of the left lung.  Continue with airway clearance mechanisms.   Bronchoscopy results reviewed.  Concerning for left bronchus malignancy with pending results.  Radiology oncology consulted for palliative radiation.  Started on 10/04 with for total of 5 sessions. 2/5. Patient need to be in the hospital to finish her radiation course.  Medical oncology updated.      Possible community-acquired/obstructive pneumonia   Leukocytosis   Even though the patient does not have any convincing signs of pneumonia, she does have leukocytosis and some infiltrates in the left upper lung lobe  Continue IV Rocephin and doxycycline total of 5 days.  Finished  Continue DuoNebs      Uncontrolled DM w/ hyperglycemia  A1c 7.9%  Episodes of hypoglycemia. Resolved  Patient on Lantus 10 units twice daily and sliding scale.  Was also started on Jardiance.     Continue to have high blood sugar.  -Restart home hypoglycemic slowly(metformin/glipizide)  Consider adding Premeal insulin if no improvement.       Essential hypertension  Dyslipidemia  Blood pressure is borderline.   Blood pressure management per cardiology.    Expected Discharge Location and Transportation: Rehab once 5 days of radiation therapy finished.  Expected Discharge   Expected Discharge Date: 10/4/2024; Expected Discharge Time:      VTE Prophylaxis:  Pharmacologic VTE  prophylaxis orders are present.         AM-PAC 6 Clicks Score (PT): 24 (10/04/24 0820)    CODE STATUS:   Code Status and Medical Interventions: CPR (Attempt to Resuscitate); Full Support   Ordered at: 09/27/24 0713     Code Status (Patient has no pulse and is not breathing):    CPR (Attempt to Resuscitate)     Medical Interventions (Patient has pulse or is breathing):    Full Support     Copied text in this note has been reviewed and is accurate as of 10/05/24.     Romana Whiting MD  10/05/24

## 2024-10-06 LAB
ANION GAP SERPL CALCULATED.3IONS-SCNC: 3 MMOL/L (ref 5–15)
BUN SERPL-MCNC: 16 MG/DL (ref 8–23)
BUN/CREAT SERPL: 34 (ref 7–25)
CALCIUM SPEC-SCNC: 8.7 MG/DL (ref 8.6–10.5)
CHLORIDE SERPL-SCNC: 94 MMOL/L (ref 98–107)
CO2 SERPL-SCNC: 38 MMOL/L (ref 22–29)
CREAT SERPL-MCNC: 0.47 MG/DL (ref 0.57–1)
EGFRCR SERPLBLD CKD-EPI 2021: 107.1 ML/MIN/1.73
GLUCOSE BLDC GLUCOMTR-MCNC: 111 MG/DL (ref 70–130)
GLUCOSE BLDC GLUCOMTR-MCNC: 123 MG/DL (ref 70–130)
GLUCOSE BLDC GLUCOMTR-MCNC: 129 MG/DL (ref 70–130)
GLUCOSE BLDC GLUCOMTR-MCNC: 63 MG/DL (ref 70–130)
GLUCOSE BLDC GLUCOMTR-MCNC: 88 MG/DL (ref 70–130)
GLUCOSE SERPL-MCNC: 101 MG/DL (ref 65–99)
POTASSIUM SERPL-SCNC: 5 MMOL/L (ref 3.5–5.2)
SODIUM SERPL-SCNC: 135 MMOL/L (ref 136–145)

## 2024-10-06 PROCEDURE — 99232 SBSQ HOSP IP/OBS MODERATE 35: CPT | Performed by: HOSPITALIST

## 2024-10-06 PROCEDURE — 82948 REAGENT STRIP/BLOOD GLUCOSE: CPT

## 2024-10-06 PROCEDURE — 80048 BASIC METABOLIC PNL TOTAL CA: CPT | Performed by: HOSPITALIST

## 2024-10-06 PROCEDURE — 25010000002 ENOXAPARIN PER 10 MG: Performed by: INTERNAL MEDICINE

## 2024-10-06 PROCEDURE — 63710000001 INSULIN GLARGINE PER 5 UNITS: Performed by: HOSPITALIST

## 2024-10-06 RX ORDER — IPRATROPIUM BROMIDE AND ALBUTEROL SULFATE 2.5; .5 MG/3ML; MG/3ML
3 SOLUTION RESPIRATORY (INHALATION) EVERY 6 HOURS PRN
Status: DISCONTINUED | OUTPATIENT
Start: 2024-10-06 | End: 2024-10-07 | Stop reason: SDUPTHER

## 2024-10-06 RX ADMIN — ENOXAPARIN SODIUM 40 MG: 100 INJECTION SUBCUTANEOUS at 08:21

## 2024-10-06 RX ADMIN — PANTOPRAZOLE SODIUM 40 MG: 40 TABLET, DELAYED RELEASE ORAL at 05:55

## 2024-10-06 RX ADMIN — CARVEDILOL 3.12 MG: 3.12 TABLET, FILM COATED ORAL at 08:21

## 2024-10-06 RX ADMIN — GUAIFENESIN 1200 MG: 600 TABLET, EXTENDED RELEASE ORAL at 08:21

## 2024-10-06 RX ADMIN — INSULIN GLARGINE 10 UNITS: 100 INJECTION, SOLUTION SUBCUTANEOUS at 22:25

## 2024-10-06 RX ADMIN — INSULIN GLARGINE 10 UNITS: 100 INJECTION, SOLUTION SUBCUTANEOUS at 08:21

## 2024-10-06 RX ADMIN — GLIPIZIDE 5 MG: 10 TABLET ORAL at 08:21

## 2024-10-06 RX ADMIN — DIPHENHYDRAMINE HYDROCHLORIDE AND LIDOCAINE HYDROCHLORIDE AND ALUMINUM HYDROXIDE AND MAGNESIUM HYDRO 5 ML: KIT at 22:27

## 2024-10-06 RX ADMIN — DIPHENHYDRAMINE HYDROCHLORIDE AND LIDOCAINE HYDROCHLORIDE AND ALUMINUM HYDROXIDE AND MAGNESIUM HYDRO 5 ML: KIT at 05:55

## 2024-10-06 RX ADMIN — MIDODRINE HYDROCHLORIDE 5 MG: 5 TABLET ORAL at 12:10

## 2024-10-06 RX ADMIN — FUROSEMIDE 20 MG: 20 TABLET ORAL at 08:21

## 2024-10-06 RX ADMIN — ASPIRIN 81 MG: 81 TABLET, COATED ORAL at 08:21

## 2024-10-06 RX ADMIN — Medication 10 ML: at 08:22

## 2024-10-06 RX ADMIN — DIPHENHYDRAMINE HYDROCHLORIDE AND LIDOCAINE HYDROCHLORIDE AND ALUMINUM HYDROXIDE AND MAGNESIUM HYDRO 5 ML: KIT at 08:22

## 2024-10-06 RX ADMIN — GUAIFENESIN 1200 MG: 600 TABLET, EXTENDED RELEASE ORAL at 22:25

## 2024-10-06 RX ADMIN — MIDODRINE HYDROCHLORIDE 5 MG: 5 TABLET ORAL at 08:21

## 2024-10-06 RX ADMIN — EMPAGLIFLOZIN 10 MG: 10 TABLET, FILM COATED ORAL at 08:21

## 2024-10-06 RX ADMIN — METFORMIN HYDROCHLORIDE 1000 MG: 1000 TABLET ORAL at 08:21

## 2024-10-06 RX ADMIN — DIPHENHYDRAMINE HYDROCHLORIDE AND LIDOCAINE HYDROCHLORIDE AND ALUMINUM HYDROXIDE AND MAGNESIUM HYDRO 5 ML: KIT at 18:20

## 2024-10-06 RX ADMIN — CARVEDILOL 3.12 MG: 3.12 TABLET, FILM COATED ORAL at 18:19

## 2024-10-06 RX ADMIN — MIDODRINE HYDROCHLORIDE 5 MG: 5 TABLET ORAL at 18:19

## 2024-10-06 RX ADMIN — LOSARTAN POTASSIUM 25 MG: 25 TABLET, FILM COATED ORAL at 08:21

## 2024-10-06 RX ADMIN — Medication 10 ML: at 22:25

## 2024-10-06 NOTE — PROGRESS NOTES
[facility] Medicine Services  PROGRESS NOTE    Patient Name: Pati SIMON  : 1960  MRN: [id number]    Date of Admission: 2024  Primary Care Physician: No primary care provider on file.    Subjective   Subjective     CC: Dyspnea    HPI: In bed. Tired. Occasional cough, had blood 10/. No f/c.     Objective   Objective     Vital Signs:   Temp:  [97.6 °F (36.4 °C)-98.7 °F (37.1 °C)] 97.9 °F (36.6 °C)  Heart Rate:  [76-91] 90  Resp:  [18-19] 18  BP: (101-120)/(50-74) 117/74  Flow (L/min):  [2] 2     Physical Exam:  NAD, alert  OP clear, dry MM  Neck supple  RRR  Diminished on L  +BS, soft  TRUJILLO  Normal affect    Results Reviewed:  LAB RESULTS:      Lab 10/04/24  0553 10/01/24  0445 09/30/24  0447   WBC 10.67 12.39* 14.35*   HEMOGLOBIN 10.5* 11.1* 10.7*   HEMATOCRIT 33.8* 36.3 35.4   PLATELETS 422 339 420   NEUTROS ABS 8.83* 9.21* 10.43*   IMMATURE GRANS (ABS) 0.03 0.05 0.07*   LYMPHS ABS 0.65* 1.35 1.73   MONOS ABS 1.15* 1.72* 2.04*   EOS ABS 0.00 0.04 0.05   MCV 79.7 81.9 81.9         Lab 10/06/24  0519 10/04/24  0553 10/03/24  0829 10/02/24  0404 10/01/24  0445 09/30/24  0447   SODIUM 135* 134* 138 134* 133* 138   POTASSIUM 5.0 5.6* 4.4 4.5 5.5* 4.7   CHLORIDE 94* 94* 97* 94* 95* 97*   CO2 38.0* 35.0* 35.0* 33.0* 30.0* 35.0*   ANION GAP 3.0* 5.0 6.0 7.0 8.0 6.0   BUN 16 19 11 16 15 21   CREATININE 0.47* 0.70 0.37* 0.64 0.58 0.55*   EGFR 107.1 97.3 113.5 99.4 101.8 103.1   GLUCOSE 101* 290* 170* 207* 102* 61*   CALCIUM 8.7 8.8 8.8 8.6 8.4* 8.8   MAGNESIUM  --   --   --   --  2.0 1.7   PHOSPHORUS  --   --   --   --   --  4.1         Lab 10/04/24  0553 10/01/24  0445 24  0447   TOTAL PROTEIN 5.1* 5.1* 5.6*   ALBUMIN 2.8* 2.8* 3.2*   GLOBULIN 2.3 2.3 2.4   ALT (SGPT) 7 16 24   AST (SGOT) 8 14 14   BILIRUBIN <0.2 0.2 0.2   ALK PHOS 95 117 137*               Lab 247   CHOLESTEROL 155   TRIGLYCERIDES 74               Brief Urine Lab Results       None             Microbiology Results Abnormal       Procedure Component Value - Date/Time    Blood Culture - Blood, Arm, Right [[id number]]  (Normal) Collected: 09/27/24 0601    Lab Status: Final result Specimen: Blood from Arm, Right Updated: 10/02/24 0645     Blood Culture No growth at 5 days    Narrative:      Less than seven (7) mL's of blood was collected.  Insufficient quantity may yield false negative results.    Blood Culture - Blood, Arm, Left [[id number]]  (Normal) Collected: 09/27/24 0459    Lab Status: Final result Specimen: Blood from Arm, Left Updated: 10/02/24 0645     Blood Culture No growth at 5 days    Narrative:      Less than seven (7) mL's of blood was collected.  Insufficient quantity may yield false negative results.    MRSA Screen, PCR (Inpatient) - Swab, Nares [[id number]]  (Normal) Collected: 09/28/24 1157    Lab Status: Final result Specimen: Swab from Nares Updated: 09/28/24 1413     MRSA PCR Negative    Narrative:      The negative predictive value of this diagnostic test is high and should only be used to consider de-escalating anti-MRSA therapy. A positive result may indicate colonization with MRSA and must be correlated clinically.  MRSA Negative    S. Pneumo Ag Urine or CSF - Urine, Urine, Clean Catch [[id number]]  (Normal) Collected: 09/27/24 1258    Lab Status: Final result Specimen: Urine, Clean Catch Updated: 09/27/24 1909     Strep Pneumo Ag Negative    Legionella Antigen, Urine - Urine, Urine, Clean Catch [[id number]]  (Normal) Collected: 09/27/24 1258    Lab Status: Final result Specimen: Urine, Clean Catch Updated: 09/27/24 1908     LEGIONELLA ANTIGEN, URINE Negative    Narrative:      2025-09-26    Respiratory Panel PCR w/COVID-19(SARS-CoV-2) ARNAV/NAIMA/AUNG/PAD/COR/JE In-House, NP Swab in UTM/VTM, 2 HR TAT - Swab, Nasopharynx [[id number]]  (Normal) Collected: 09/27/24 0524    Lab Status: Final result Specimen: Swab from Nasopharynx Updated: 09/27/24 0621     ADENOVIRUS, PCR Not Detected      Coronavirus 229E Not Detected     Coronavirus HKU1 Not Detected     Coronavirus NL63 Not Detected     Coronavirus OC43 Not Detected     COVID19 Not Detected     Human Metapneumovirus Not Detected     Human Rhinovirus/Enterovirus Not Detected     Influenza A PCR Not Detected     Influenza B PCR Not Detected     Parainfluenza Virus 1 Not Detected     Parainfluenza Virus 2 Not Detected     Parainfluenza Virus 3 Not Detected     Parainfluenza Virus 4 Not Detected     RSV, PCR Not Detected     Bordetella pertussis pcr Not Detected     Bordetella parapertussis PCR Not Detected     Chlamydophila pneumoniae PCR Not Detected     Mycoplasma pneumo by PCR Not Detected    Narrative:      In the setting of a positive respiratory panel with a viral infection PLUS a negative procalcitonin without other underlying concern for bacterial infection, consider observing off antibiotics or discontinuation of antibiotics and continue supportive care. If the respiratory panel is positive for atypical bacterial infection (Bordetella pertussis, Chlamydophila pneumoniae, or Mycoplasma pneumoniae), consider antibiotic de-escalation to target atypical bacterial infection.            No radiology results from the last 24 hrs    Results for orders placed during the hospital encounter of 09/27/24    Adult Transthoracic Echo Complete W/ Cont if Necessary Per Protocol    Interpretation Summary    Left ventricular ejection fraction appears to be less than 20%.    The left ventricular cavity is mild to moderately dilated.    The left atrial cavity is mildly dilated.    Estimated right ventricular systolic pressure from tricuspid regurgitation is mildly elevated (38 mmHg).    There is a left pleural effusion. There is a right pleural effusion.      Current medications:  Scheduled Meds:aspirin, 81 mg, Oral, Daily  carvedilol, 3.125 mg, Oral, BID With Meals  empagliflozin, 10 mg, Oral, Daily  enoxaparin, 40 mg, Subcutaneous, Daily  First Mouthwash (Magic  Mouthwash), 5 mL, Swish & Spit, Q6H  furosemide, 20 mg, Oral, Daily  glipizide, 5 mg, Oral, BID AC  guaiFENesin, 1,200 mg, Oral, Q12H  insulin glargine, 10 Units, Subcutaneous, Q12H  insulin lispro, 2-9 Units, Subcutaneous, 4x Daily AC & at Bedtime  losartan, 25 mg, Oral, Daily  metFORMIN, 1,000 mg, Oral, BID With Meals  midodrine, 5 mg, Oral, TID AC  pantoprazole, 40 mg, Oral, Q AM  sodium chloride, 10 mL, Intravenous, Q12H      Continuous Infusions:       PRN Meds:.  acetaminophen **OR** acetaminophen **OR** acetaminophen    senna-docusate sodium **AND** polyethylene glycol **AND** bisacodyl **AND** bisacodyl    Calcium Replacement - Follow Nurse / BPA Driven Protocol    cyclobenzaprine    dextrose    dextrose    glucagon (human recombinant)    ipratropium-albuterol    Magnesium Standard Dose Replacement - Follow Nurse / BPA Driven Protocol    ondansetron ODT **OR** ondansetron    Phosphorus Replacement - Follow Nurse / BPA Driven Protocol    Potassium Replacement - Follow Nurse / BPA Driven Protocol    sodium chloride    sodium chloride    sodium chloride    Assessment & Plan   Assessment & Plan     Active Hospital Problems    Diagnosis  POA    **Mass of upper lobe of left lung [R91.8]  Yes    NICM (nonischemic cardiomyopathy) [I42.8]  Yes    Coronary artery disease involving native coronary artery of native heart without angina pectoris [I25.10]  Yes    HFrEF (heart failure with reduced ejection fraction) [I50.20]  Yes    Type 2 diabetes mellitus with hyperglycemia, without long-term current use of insulin [E11.65]  Yes    Hyperlipidemia LDL goal <70 [E78.5]  Yes      Resolved Hospital Problems    Diagnosis Date Resolved POA    Pneumonia [J18.9] 09/28/2024 Yes    Acute respiratory failure with hypoxia [J96.01] 09/29/2024 Yes    Sepsis [A41.9] 09/28/2024 Yes    Demand ischemia [I24.89] 10/02/2024 Yes    Lactic acid increased [E87.20] 10/02/2024 Yes    Primary hypertension [I10] 10/01/2024 Yes        Brief  "Hospital Course to date:  Pati SIMON is a 64 y.o. female presenting to ED with SOA. Says she started to feel ill about 1 week ago at which time \"felt like she had a cold going on.\" Over the week continued to feel weaker. Developed SOA which hit suddenly late last night prompting her to come to ED. Feels better now after receiving tx in ED. Denies cough, high fevers.    Acute decompensated systolic heart failure, improved  Nonischemic cardiomyopathy   Elevated troponin, likely demand ischemia  -ECHO with 20% EF  -s/p diuresis  -CAD, per cardiology, GDMT    Hypotension  -midodrine, and will limited GDMT    Newly diagnosed left lung neoplasm  -CTA chest showed left upper lung neoplasm concerning for malignancy  -Bronchoscopy showed almost complete obstruction of the left mainstem bronchus.  Biopsies obtained  -pathology pending  -RAD/ONC consulted, started XRT 10/4  -possible brain mets per MRI    Possible community-acquired/obstructive pneumonia   Leukocytosis   -completed course of antibiotics      Uncontrolled DM w/ hyperglycemia  -SSI     Essential hypertension  Dyslipidemia    Expected Discharge Location and Transportation: Rehab once 5 days of radiation therapy finished.  Expected Discharge   Expected Discharge Date: 10/10/2024; Expected Discharge Time:      VTE Prophylaxis:  Pharmacologic VTE prophylaxis orders are present.         AM-PAC 6 Clicks Score (PT): 24 (10/04/24 0820)    CODE STATUS:   Code Status and Medical Interventions: CPR (Attempt to Resuscitate); Full Support   Ordered at: 09/27/24 0713     Code Status (Patient has no pulse and is not breathing):    CPR (Attempt to Resuscitate)     Medical Interventions (Patient has pulse or is breathing):    Full Support     Copied text in this note has been reviewed and is accurate as of 10/06/24.     Aaron Krueger MD  10/06/24      "

## 2024-10-07 PROBLEM — I50.21 ACUTE HFREF (HEART FAILURE WITH REDUCED EJECTION FRACTION): Status: ACTIVE | Noted: 2024-10-07

## 2024-10-07 LAB
GLUCOSE BLDC GLUCOMTR-MCNC: 119 MG/DL (ref 70–130)
GLUCOSE BLDC GLUCOMTR-MCNC: 203 MG/DL (ref 70–130)
GLUCOSE BLDC GLUCOMTR-MCNC: 226 MG/DL (ref 70–130)
GLUCOSE BLDC GLUCOMTR-MCNC: 240 MG/DL (ref 70–130)
REF LAB TEST METHOD: NORMAL

## 2024-10-07 PROCEDURE — 99232 SBSQ HOSP IP/OBS MODERATE 35: CPT | Performed by: INTERNAL MEDICINE

## 2024-10-07 PROCEDURE — 25010000002 ACETAZOLAMIDE PER 500 MG: Performed by: INTERNAL MEDICINE

## 2024-10-07 PROCEDURE — 99232 SBSQ HOSP IP/OBS MODERATE 35: CPT | Performed by: NURSE PRACTITIONER

## 2024-10-07 PROCEDURE — 63710000001 INSULIN GLARGINE PER 5 UNITS: Performed by: HOSPITALIST

## 2024-10-07 PROCEDURE — 82948 REAGENT STRIP/BLOOD GLUCOSE: CPT

## 2024-10-07 PROCEDURE — 63710000001 INSULIN LISPRO (HUMAN) PER 5 UNITS: Performed by: INTERNAL MEDICINE

## 2024-10-07 PROCEDURE — 25010000002 ENOXAPARIN PER 10 MG: Performed by: INTERNAL MEDICINE

## 2024-10-07 PROCEDURE — 99232 SBSQ HOSP IP/OBS MODERATE 35: CPT | Performed by: HOSPITALIST

## 2024-10-07 RX ORDER — MIDODRINE HYDROCHLORIDE 5 MG/1
5 TABLET ORAL
Qty: 180 TABLET | Refills: 0 | Status: CANCELLED | OUTPATIENT
Start: 2024-10-07

## 2024-10-07 RX ORDER — ACETAZOLAMIDE SODIUM 500 MG/5ML
500 INJECTION, POWDER, LYOPHILIZED, FOR SOLUTION INTRAVENOUS ONCE
Status: COMPLETED | OUTPATIENT
Start: 2024-10-07 | End: 2024-10-07

## 2024-10-07 RX ORDER — FUROSEMIDE 20 MG
20 TABLET ORAL DAILY
Qty: 60 TABLET | Refills: 0 | Status: CANCELLED | OUTPATIENT
Start: 2024-10-08

## 2024-10-07 RX ORDER — IPRATROPIUM BROMIDE AND ALBUTEROL SULFATE 2.5; .5 MG/3ML; MG/3ML
3 SOLUTION RESPIRATORY (INHALATION) EVERY 6 HOURS PRN
Status: DISCONTINUED | OUTPATIENT
Start: 2024-10-07 | End: 2024-10-08

## 2024-10-07 RX ADMIN — MIDODRINE HYDROCHLORIDE 5 MG: 5 TABLET ORAL at 13:24

## 2024-10-07 RX ADMIN — INSULIN GLARGINE 10 UNITS: 100 INJECTION, SOLUTION SUBCUTANEOUS at 09:10

## 2024-10-07 RX ADMIN — ASPIRIN 81 MG: 81 TABLET, COATED ORAL at 09:10

## 2024-10-07 RX ADMIN — LOSARTAN POTASSIUM 25 MG: 25 TABLET, FILM COATED ORAL at 09:10

## 2024-10-07 RX ADMIN — INSULIN GLARGINE 10 UNITS: 100 INJECTION, SOLUTION SUBCUTANEOUS at 22:11

## 2024-10-07 RX ADMIN — MIDODRINE HYDROCHLORIDE 5 MG: 5 TABLET ORAL at 17:27

## 2024-10-07 RX ADMIN — FUROSEMIDE 20 MG: 20 TABLET ORAL at 09:10

## 2024-10-07 RX ADMIN — ENOXAPARIN SODIUM 40 MG: 100 INJECTION SUBCUTANEOUS at 09:10

## 2024-10-07 RX ADMIN — Medication 10 ML: at 09:11

## 2024-10-07 RX ADMIN — INSULIN LISPRO 4 UNITS: 100 INJECTION, SOLUTION INTRAVENOUS; SUBCUTANEOUS at 17:27

## 2024-10-07 RX ADMIN — ACETAZOLAMIDE 500 MG: 500 INJECTION, POWDER, LYOPHILIZED, FOR SOLUTION INTRAVENOUS at 13:24

## 2024-10-07 RX ADMIN — PANTOPRAZOLE SODIUM 40 MG: 40 TABLET, DELAYED RELEASE ORAL at 05:33

## 2024-10-07 RX ADMIN — INSULIN LISPRO 4 UNITS: 100 INJECTION, SOLUTION INTRAVENOUS; SUBCUTANEOUS at 12:23

## 2024-10-07 RX ADMIN — MIDODRINE HYDROCHLORIDE 5 MG: 5 TABLET ORAL at 09:10

## 2024-10-07 RX ADMIN — DIPHENHYDRAMINE HYDROCHLORIDE AND LIDOCAINE HYDROCHLORIDE AND ALUMINUM HYDROXIDE AND MAGNESIUM HYDRO 5 ML: KIT at 17:27

## 2024-10-07 RX ADMIN — DIPHENHYDRAMINE HYDROCHLORIDE AND LIDOCAINE HYDROCHLORIDE AND ALUMINUM HYDROXIDE AND MAGNESIUM HYDRO 5 ML: KIT at 09:11

## 2024-10-07 RX ADMIN — Medication 10 ML: at 22:12

## 2024-10-07 RX ADMIN — GUAIFENESIN 1200 MG: 600 TABLET, EXTENDED RELEASE ORAL at 22:11

## 2024-10-07 RX ADMIN — CARVEDILOL 3.12 MG: 3.12 TABLET, FILM COATED ORAL at 17:27

## 2024-10-07 RX ADMIN — CARVEDILOL 3.12 MG: 3.12 TABLET, FILM COATED ORAL at 09:10

## 2024-10-07 RX ADMIN — INSULIN LISPRO 4 UNITS: 100 INJECTION, SOLUTION INTRAVENOUS; SUBCUTANEOUS at 22:12

## 2024-10-07 RX ADMIN — EMPAGLIFLOZIN 10 MG: 10 TABLET, FILM COATED ORAL at 09:10

## 2024-10-07 RX ADMIN — GUAIFENESIN 1200 MG: 600 TABLET, EXTENDED RELEASE ORAL at 09:10

## 2024-10-07 NOTE — PROGRESS NOTES
Radiation oncology ON TREATMENT note    Patient remains hospitalized with malignant airway obstructive lung disease.  She initiated a course of palliative radiotherapy and receive treatment last Friday and Saturday.  We are awaiting her third out of 5 treatments to hopefully receive treatment later this evening, after the linear accelerator treatment machine is again operational.  Breathing is stable.  We are awaiting results from her biopsy pathology report.    Plan:  Continue palliative radiotherapy during this hospital stay hopefully complete treatment on 10/9/2024.

## 2024-10-07 NOTE — PLAN OF CARE
Goal Outcome Evaluation:  Plan of Care Reviewed With: patient        Progress: no change  Outcome Evaluation: VSS, sats> 90% on 2LNC, NSR on tele. call light within reach, bed in lowest postion, no complaints this shift. care ongoing

## 2024-10-07 NOTE — CASE MANAGEMENT/SOCIAL WORK
Continued Stay Note  [facility]     Patient Name: Pati SIMON  MRN: [id number]  Today's Date: 10/7/2024    Admit Date: 9/27/2024    Plan: discharge plan   Discharge Plan       Row Name 10/07/24 1524       Plan    Plan discharge plan    Plan Comments Per discussion in MDR, pt is having radiation tomorrow. I met with pt at bedside to discuss discharge plan. Plan is home with roommate. Pt reports she has a ride home. When I asked pt about discharge needs, she states she wants to see how she does over next 24 hours. CM will cont to follow    Final Discharge Disposition Code 01 - home or self-care                   Discharge Codes    No documentation.                 Expected Discharge Date and Time       Expected Discharge Date Expected Discharge Time    Oct 10, 2024               Shivani Michelle RN

## 2024-10-07 NOTE — PROGRESS NOTES
"PULMONARY PROGRESS NOTE    Patient Care Team:  Anuradha Toledo APRN as PCP - General (Urgent Care)  Zarina Simpson MD as Consulting Physician (Hematology)  Alirio De La Cruz DO as Referring Physician (Pulmonary Disease)  Sylvester Cadena MD as Consulting Physician (Radiation Oncology)    Reason for Consult     Lung mass, left upper lobe    Subjective     Interval History:     Patient has had some scant blood in her sputum postbiopsy.  She is short of breath with activity.  Currently on 2 L her saturation is 96    Review of Systems:     ROS negative except for: Short of air, cough,      Objective     Vital Signs  Blood pressure 100/63, pulse 89, temperature 98.1 °F (36.7 °C), temperature source Oral, resp. rate 18, height 170.2 cm (67\"), weight 62.5 kg (137 lb 12.8 oz), SpO2 96%.    Physical Exam:    Physical Exam  Constitutional:       General: She is not in acute distress.  HENT:      Head: Normocephalic and atraumatic.      Nose: No congestion.      Mouth/Throat:      Pharynx: Oropharynx is clear.   Eyes:      Conjunctiva/sclera: Conjunctivae normal.   Cardiovascular:      Rate and Rhythm: Tachycardia present.      Heart sounds: Normal heart sounds.   Pulmonary:      Comments: Diminished chest excursion and diminished breath sounds on the left, prolonged expiration with some mild end expiratory wheezing on the right  Abdominal:      General: Bowel sounds are normal.      Palpations: Abdomen is soft.   Skin:     General: Skin is warm and dry.   Neurological:      Mental Status: She is alert and oriented to person, place, and time.           Results Review:    Lab Results (last 24 hours)       Procedure Component Value Units Date/Time    POC Glucose Once [[id number]]  (Normal) Collected: 10/07/24 0752    Specimen: Blood Updated: 10/07/24 0759     Glucose 119 mg/dL     POC Glucose Once [[id number]]  (Normal) Collected: 10/06/24 2020    Specimen: Blood Updated: 10/06/24 2022     Glucose 123 mg/dL  "    POC Glucose Once [[id number]]  (Normal) Collected: 10/06/24 1715    Specimen: Blood Updated: 10/06/24 1716     Glucose 111 mg/dL     POC Glucose Once [[id number]]  (Abnormal) Collected: 10/06/24 1701    Specimen: Blood Updated: 10/06/24 1703     Glucose 63 mg/dL     POC Glucose Once [[id number]]  (Normal) Collected: 10/06/24 1157    Specimen: Blood Updated: 10/06/24 1158     Glucose 88 mg/dL           Imaging Results (Last 24 Hours)       ** No results found for the last 24 hours. **             aspirin, 81 mg, Oral, Daily  carvedilol, 3.125 mg, Oral, BID With Meals  empagliflozin, 10 mg, Oral, Daily  enoxaparin, 40 mg, Subcutaneous, Daily  First Mouthwash (Magic Mouthwash), 5 mL, Swish & Spit, Q6H  furosemide, 20 mg, Oral, Daily  [Held by provider] glipizide, 5 mg, Oral, BID AC  guaiFENesin, 1,200 mg, Oral, Q12H  insulin glargine, 10 Units, Subcutaneous, Q12H  insulin lispro, 2-9 Units, Subcutaneous, 4x Daily AC & at Bedtime  losartan, 25 mg, Oral, Daily  [Held by provider] metFORMIN, 1,000 mg, Oral, BID With Meals  midodrine, 5 mg, Oral, TID AC  pantoprazole, 40 mg, Oral, Q AM  sodium chloride, 10 mL, Intravenous, Q12H          Mass of upper lobe of left lung    Type 2 diabetes mellitus with hyperglycemia, without long-term current use of insulin    Hyperlipidemia LDL goal <70    HFrEF (heart failure with reduced ejection fraction)    Coronary artery disease involving native coronary artery of native heart without angina pectoris    NICM (nonischemic cardiomyopathy)    Acute HFrEF (heart failure with reduced ejection fraction)      Assessment & Plan     64-year-old woman with diabetes, hypertension, presenting with shortness of air and found to have a large left upper lobe mass as well as a left ventricular ejection fraction of 20%.  After treatment CT coronary angiogram revealed some moderate coronary disease and an EF improved to 43%.  She underwent bronchoscopy October 3, 2024, revealing 90% obstructing mass in  the left mainstem bronchus.  Primary mass was biopsied as well as the subcarinal stage VII lymph node.  Pathology is still pending.  Because of the pending complete obstruction of her left mainstem bronchus, radiation therapy initiated.  She received her first treatment on October 4.  She was to continue treatment today but there was a machine malfunction.  Clear suspicion is that this is a primary bronchogenic cancer.  Once we have a tissue diagnosis, oncology can determine additional treatment plan.  She does have some mild end expiratory wheezing on examination.  This may be simply referred wheezing from her large airway obstruction.  She does not appear in distress.  Current oxygen saturation 96% on 2 L.  She has not previously used inhalers or oxygen at home.    -Add as needed nebulizer treatments for her COPD  -Consider initiating Trelegy 1 puff daily at discharge  -Diamox for worsening contraction alkalosis  -Maintain oxygen saturation greater than 88%  -Follow-up pathology      Mary Lopez MD  10/07/24  11:58 EDT      Time: 30min

## 2024-10-07 NOTE — PROGRESS NOTES
[facility] Medicine Services  PROGRESS NOTE    Patient Name: Pati SIMON  : 1960  MRN: [id number]    Date of Admission: 2024  Primary Care Physician: Anuradha Toledo APRN    Subjective   Subjective     CC: Dyspnea    HPI: In bed. No issues. No f/c.     Objective   Objective     Vital Signs:   Temp:  [97.5 °F (36.4 °C)-98.5 °F (36.9 °C)] 98.1 °F (36.7 °C)  Heart Rate:  [85-89] 89  Resp:  [18] 18  BP: ()/(57-71) 100/63  Flow (L/min):  [2] 2     Physical Exam:  NAD, alert  OP clear, dry MM  Neck supple  RRR  Diminished on L  +BS, soft  TRUJILLO  Normal affect  No change in examination from 10/6    Results Reviewed:  LAB RESULTS:      Lab 10/04/24  0553 10/01/24  0445   WBC 10.67 12.39*   HEMOGLOBIN 10.5* 11.1*   HEMATOCRIT 33.8* 36.3   PLATELETS 422 339   NEUTROS ABS 8.83* 9.21*   IMMATURE GRANS (ABS) 0.03 0.05   LYMPHS ABS 0.65* 1.35   MONOS ABS 1.15* 1.72*   EOS ABS 0.00 0.04   MCV 79.7 81.9         Lab 10/06/24  0519 10/04/24  0553 10/03/24  0829 10/02/24  0404 10/01/24  0445   SODIUM 135* 134* 138 134* 133*   POTASSIUM 5.0 5.6* 4.4 4.5 5.5*   CHLORIDE 94* 94* 97* 94* 95*   CO2 38.0* 35.0* 35.0* 33.0* 30.0*   ANION GAP 3.0* 5.0 6.0 7.0 8.0   BUN 16 19 11 16 15   CREATININE 0.47* 0.70 0.37* 0.64 0.58   EGFR 107.1 97.3 113.5 99.4 101.8   GLUCOSE 101* 290* 170* 207* 102*   CALCIUM 8.7 8.8 8.8 8.6 8.4*   MAGNESIUM  --   --   --   --  2.0         Lab 10/04/24  0553 10/01/24  0445   TOTAL PROTEIN 5.1* 5.1*   ALBUMIN 2.8* 2.8*   GLOBULIN 2.3 2.3   ALT (SGPT) 7 16   AST (SGOT) 8 14   BILIRUBIN <0.2 0.2   ALK PHOS 95 117                           Brief Urine Lab Results       None            Microbiology Results Abnormal       Procedure Component Value - Date/Time    Blood Culture - Blood, Arm, Right [[id number]]  (Normal) Collected: 24 0601    Lab Status: Final result Specimen: Blood from Arm, Right Updated: 10/02/24 0645     Blood Culture No growth at 5 days     Narrative:      Less than seven (7) mL's of blood was collected.  Insufficient quantity may yield false negative results.    Blood Culture - Blood, Arm, Left [[id number]]  (Normal) Collected: 09/27/24 0459    Lab Status: Final result Specimen: Blood from Arm, Left Updated: 10/02/24 0645     Blood Culture No growth at 5 days    Narrative:      Less than seven (7) mL's of blood was collected.  Insufficient quantity may yield false negative results.    MRSA Screen, PCR (Inpatient) - Swab, Nares [[id number]]  (Normal) Collected: 09/28/24 1157    Lab Status: Final result Specimen: Swab from Nares Updated: 09/28/24 1413     MRSA PCR Negative    Narrative:      The negative predictive value of this diagnostic test is high and should only be used to consider de-escalating anti-MRSA therapy. A positive result may indicate colonization with MRSA and must be correlated clinically.  MRSA Negative    S. Pneumo Ag Urine or CSF - Urine, Urine, Clean Catch [[id number]]  (Normal) Collected: 09/27/24 1258    Lab Status: Final result Specimen: Urine, Clean Catch Updated: 09/27/24 1909     Strep Pneumo Ag Negative    Legionella Antigen, Urine - Urine, Urine, Clean Catch [[id number]]  (Normal) Collected: 09/27/24 1258    Lab Status: Final result Specimen: Urine, Clean Catch Updated: 09/27/24 1908     LEGIONELLA ANTIGEN, URINE Negative    Narrative:      2025-09-26    Respiratory Panel PCR w/COVID-19(SARS-CoV-2) ARNAV/NAIMA/AUNG/PAD/COR/JE In-House, NP Swab in UTM/VTM, 2 HR TAT - Swab, Nasopharynx [[id number]]  (Normal) Collected: 09/27/24 0524    Lab Status: Final result Specimen: Swab from Nasopharynx Updated: 09/27/24 0621     ADENOVIRUS, PCR Not Detected     Coronavirus 229E Not Detected     Coronavirus HKU1 Not Detected     Coronavirus NL63 Not Detected     Coronavirus OC43 Not Detected     COVID19 Not Detected     Human Metapneumovirus Not Detected     Human Rhinovirus/Enterovirus Not Detected     Influenza A PCR Not Detected     Influenza  B PCR Not Detected     Parainfluenza Virus 1 Not Detected     Parainfluenza Virus 2 Not Detected     Parainfluenza Virus 3 Not Detected     Parainfluenza Virus 4 Not Detected     RSV, PCR Not Detected     Bordetella pertussis pcr Not Detected     Bordetella parapertussis PCR Not Detected     Chlamydophila pneumoniae PCR Not Detected     Mycoplasma pneumo by PCR Not Detected    Narrative:      In the setting of a positive respiratory panel with a viral infection PLUS a negative procalcitonin without other underlying concern for bacterial infection, consider observing off antibiotics or discontinuation of antibiotics and continue supportive care. If the respiratory panel is positive for atypical bacterial infection (Bordetella pertussis, Chlamydophila pneumoniae, or Mycoplasma pneumoniae), consider antibiotic de-escalation to target atypical bacterial infection.            No radiology results from the last 24 hrs    Results for orders placed during the hospital encounter of 09/27/24    Adult Transthoracic Echo Complete W/ Cont if Necessary Per Protocol    Interpretation Summary    Left ventricular ejection fraction appears to be less than 20%.    The left ventricular cavity is mild to moderately dilated.    The left atrial cavity is mildly dilated.    Estimated right ventricular systolic pressure from tricuspid regurgitation is mildly elevated (38 mmHg).    There is a left pleural effusion. There is a right pleural effusion.      Current medications:  Scheduled Meds:aspirin, 81 mg, Oral, Daily  carvedilol, 3.125 mg, Oral, BID With Meals  empagliflozin, 10 mg, Oral, Daily  enoxaparin, 40 mg, Subcutaneous, Daily  First Mouthwash (Magic Mouthwash), 5 mL, Swish & Spit, Q6H  furosemide, 20 mg, Oral, Daily  [Held by provider] glipizide, 5 mg, Oral, BID AC  guaiFENesin, 1,200 mg, Oral, Q12H  insulin glargine, 10 Units, Subcutaneous, Q12H  insulin lispro, 2-9 Units, Subcutaneous, 4x Daily AC & at Bedtime  losartan, 25 mg,  "Oral, Daily  [Held by provider] metFORMIN, 1,000 mg, Oral, BID With Meals  midodrine, 5 mg, Oral, TID AC  pantoprazole, 40 mg, Oral, Q AM  sodium chloride, 10 mL, Intravenous, Q12H      Continuous Infusions:       PRN Meds:.  acetaminophen **OR** acetaminophen **OR** acetaminophen    senna-docusate sodium **AND** polyethylene glycol **AND** bisacodyl **AND** bisacodyl    Calcium Replacement - Follow Nurse / BPA Driven Protocol    cyclobenzaprine    dextrose    dextrose    glucagon (human recombinant)    ipratropium-albuterol    Magnesium Standard Dose Replacement - Follow Nurse / BPA Driven Protocol    ondansetron ODT **OR** ondansetron    Phosphorus Replacement - Follow Nurse / BPA Driven Protocol    Potassium Replacement - Follow Nurse / BPA Driven Protocol    sodium chloride    sodium chloride    sodium chloride    Assessment & Plan   Assessment & Plan     Active Hospital Problems    Diagnosis  POA    **Mass of upper lobe of left lung [R91.8]  Yes    NICM (nonischemic cardiomyopathy) [I42.8]  Yes    Coronary artery disease involving native coronary artery of native heart without angina pectoris [I25.10]  Yes    HFrEF (heart failure with reduced ejection fraction) [I50.20]  Yes    Type 2 diabetes mellitus with hyperglycemia, without long-term current use of insulin [E11.65]  Yes    Hyperlipidemia LDL goal <70 [E78.5]  Yes      Resolved Hospital Problems    Diagnosis Date Resolved POA    Pneumonia [J18.9] 09/28/2024 Yes    Acute respiratory failure with hypoxia [J96.01] 09/29/2024 Yes    Sepsis [A41.9] 09/28/2024 Yes    Demand ischemia [I24.89] 10/02/2024 Yes    Lactic acid increased [E87.20] 10/02/2024 Yes    Primary hypertension [I10] 10/01/2024 Yes        Brief Hospital Course to date:  Pati SIMON is a 64 y.o. female presenting to ED with SOA. Says she started to feel ill about 1 week ago at which time \"felt like she had a cold going on.\" Over the week continued to feel weaker. Developed SOA which hit " suddenly late last night prompting her to come to ED. Feels better now after receiving tx in ED. Denies cough, high fevers.    Acute decompensated systolic heart failure, improved  Nonischemic cardiomyopathy   Elevated troponin, likely demand ischemia  -ECHO with 20% EF  -s/p diuresis  -CAD, per cardiology, GDMT    Hypotension  -midodrine, and will limited GDMT    Newly diagnosed left lung neoplasm  -CTA chest showed left upper lung neoplasm concerning for malignancy  -Bronchoscopy showed almost complete obstruction of the left mainstem bronchus.  Biopsies obtained  -pathology pending  -RAD/ONC consulted, started XRT 10/4  -possible brain mets per MRI    Possible community-acquired/obstructive pneumonia   Leukocytosis   -completed course of antibiotics      Uncontrolled DM w/ hyperglycemia  -SSI/stable trend     Essential hypertension  Dyslipidemia    Expected Discharge Location and Transportation: Rehab, pending pathology/treatment  Expected Discharge   Expected Discharge Date: 10/10/2024; Expected Discharge Time:      VTE Prophylaxis:  Pharmacologic VTE prophylaxis orders are present.         AM-PAC 6 Clicks Score (PT): 24 (10/04/24 0820)    CODE STATUS:   Code Status and Medical Interventions: CPR (Attempt to Resuscitate); Full Support   Ordered at: 09/27/24 0713     Code Status (Patient has no pulse and is not breathing):    CPR (Attempt to Resuscitate)     Medical Interventions (Patient has pulse or is breathing):    Full Support     Copied text in this note has been reviewed and is accurate as of 10/07/24.     Aaron Krueger MD  10/07/24

## 2024-10-07 NOTE — NURSING NOTE
Pt is alert and oriented. 2 L NC, NSR. Pt is going for radiation treatment today. Bed is in lowest position, call light within reach, and no other requests at this time.

## 2024-10-07 NOTE — PROGRESS NOTES
Cardiology Progress Note      Reason for visit:    Acute systolic heart failure  Coronary artery disease    IDENTIFICATION: 64-year-old female who resides in [place], [place]    Active Hospital Problems    Diagnosis  POA    **Mass of upper lobe of left lung [R91.8]  Yes     Priority: High    NICM (nonischemic cardiomyopathy) [I42.8]  Yes     Priority: High     Echo (9/20/2024): LVEF 20%  Coronary angiogram (9/30/2024): Mild to moderate CAD.  CAD burden does not account for degree of LV dysfunction      HFrEF (heart failure with reduced ejection fraction) [I50.20]  Yes     Priority: High     Echo (9/20/2024): LVEF 20%.  Mildly dilated left atrium.  Mild MR.  CT coronary angiogram (9/30/2024): Mild to moderate CAD.  CAD burden does not account for degree of LV dysfunction.      Type 2 diabetes mellitus with hyperglycemia, without long-term current use of insulin [E11.65]  Yes     Priority: High    Coronary artery disease involving native coronary artery of native heart without angina pectoris [I25.10]  Yes     Priority: Medium     CT coronary angiogram (9/30/2024): Mild to moderate CAD.  CAD burden does not account for degree of LV dysfunction.      Hyperlipidemia LDL goal <70 [E78.5]  Yes     Priority: Low     Statin therapy indicated due to the presence of CAD  Intolerant to atorvastatin and rosuvastatin              No events noted over the weekend.  The patient had a bronchoscopy with pathology pending.  The patient did have MRI that suggested possible metastasis to the brain.  She started radiation on Friday.  She reports her swelling in her legs has completely resolved.  She is lying flat in bed breathing easy on O2 2 L by nasal cannula.  She denies any chest pain.           Vital Sign Min/Max for last 24 hours  Temp  Min: 97.5 °F (36.4 °C)  Max: 98.5 °F (36.9 °C)   BP  Min: 95/57  Max: 116/62   Pulse  Min: 85  Max: 89   Resp  Min: 18  Max: 18   SpO2  Min: 90 %  Max: 98 %   Flow (L/min)  Min: 2  Max: 2       Intake/Output Summary (Last 24 hours) at 10/7/2024 0852  Last data filed at 10/6/2024 1600  Gross per 24 hour   Intake 1675 ml   Output --   Net 1675 ml           Physical Exam  Constitutional:       General: She is awake.   Cardiovascular:      Rate and Rhythm: Normal rate and regular rhythm.      Heart sounds: No murmur heard.  Pulmonary:      Effort: Pulmonary effort is normal.      Breath sounds: Decreased breath sounds present.   Musculoskeletal:      Right lower leg: No edema.      Left lower leg: No edema.   Skin:     General: Skin is warm and dry.   Neurological:      Mental Status: She is alert.   Psychiatric:         Behavior: Behavior is cooperative.         Tele: Sinus rhythm sinus tachycardia     Results Review (reviewed the patient's recent labs in the electronic medical record):      EKG (9/27/2024): Sinus tachycardia with frequent PVCs     CT pulmonary/coronary angiogram (9/30/2024): Left hilar mass 9.9 cm in size encasing left upper lung.  Pulmonary vascular with suspected postobstructive changes in left upper and left lower lung compatible with neoplasm.  Bilateral pleural effusions left greater than right.     Coronary CT angiogram (9/30/2024): Coronary portion shows coronary calcium score of 367.3.  Moderate CAD involving LAD.  Mildly reduced LVEF 43%.     ECHO (9/28/2024): LVEF less than 20%.  Bilateral pleural effusions.  RVSP 38 mmHg    Results from last 7 days   Lab Units 10/06/24  0519 10/04/24  0553 10/03/24  0829 10/02/24  0404 10/01/24  0445   SODIUM mmol/L 135* 134* 138 134* 133*   POTASSIUM mmol/L 5.0 5.6* 4.4 4.5 5.5*   CHLORIDE mmol/L 94* 94* 97* 94* 95*   BUN mg/dL 16 19 11 16 15   CREATININE mg/dL 0.47* 0.70 0.37* 0.64 0.58   MAGNESIUM mg/dL  --   --   --   --  2.0         Results from last 7 days   Lab Units 10/04/24  0553 10/01/24  0445   WBC 10*3/mm3 10.67 12.39*   HEMOGLOBIN g/dL 10.5* 11.1*   HEMATOCRIT % 33.8* 36.3   PLATELETS 10*3/mm3 422 339       Lab Results   Component  Value Date    HGBA1C 12.10 (H) 09/28/2024       Lab Results   Component Value Date    CHOL 155 09/30/2024    TRIG 74 09/30/2024    HDL 44 09/28/2024    LDL 93 09/28/2024              Acute systolic heart failure  LVEF about 20%  CT coronary angiogram shows no obstructive disease of proximal vessels to account for LV dysfunction  Patient appears compensated  Carvedilol 3.125 mg twice daily  Losartan 25 mg daily  Jardiance 10 mg daily  IV Lasix transitioned to 20 mg p.o. daily on 10/5           Coronary artery disease  No obstructive CAD of proximal vessels  Medical therapy recommended, including antiplatelet, statin, beta-blocker        Hypotension  Improved with midodrine at 5 mg 3 times daily.  Current /63     Hyperlipidemia with target LDL <70  Patient refuses statins and [employer] reports intolerance  Would benefit from PCSK9 inhibitor initiation at discharge        Type 2 diabetes mellitus not on insulin with hyperglycemia  Jardiance 10 mg daily     Left lung mass concerning for invasive cancer  Management per hospitalistist, oncology and pulmonology  Patient is increased but acceptable cardiovascular risk to undergo general anesthesia for bronchoscopy  Bronchoscopy showed almost complete obstruction of left mainstream bronchus.  Biopsies obtained with pathology pending  MRI showed possible brain metastasis                      Continue guideline directed medical therapy with carvedilol, losartan and Jardiance  Continue midodrine 3 times daily to support blood pressures  Defer LifeVest due to invasive lung carcinoma  Please call cardiology with any further questions.  She will follow-up with the heart failure clinic in 3 to 5 days and with cardiology in 4 to 6 weeks  Will need repeat limited echo in 3 months for reassessment of LVEF    Electronically signed by WOLF Rhodes, 10/07/24, 8:52 AM EDT.

## 2024-10-08 LAB
CYTO UR: NORMAL
GLUCOSE BLDC GLUCOMTR-MCNC: 142 MG/DL (ref 70–130)
GLUCOSE BLDC GLUCOMTR-MCNC: 146 MG/DL (ref 70–130)
GLUCOSE BLDC GLUCOMTR-MCNC: 221 MG/DL (ref 70–130)
GLUCOSE BLDC GLUCOMTR-MCNC: 255 MG/DL (ref 70–130)
LAB AP CASE REPORT: NORMAL
LAB AP CLINICAL INFORMATION: NORMAL
LAB AP DIAGNOSIS COMMENT: NORMAL
PATH REPORT.FINAL DX SPEC: NORMAL
PATH REPORT.GROSS SPEC: NORMAL

## 2024-10-08 PROCEDURE — 94664 DEMO&/EVAL PT USE INHALER: CPT

## 2024-10-08 PROCEDURE — 82948 REAGENT STRIP/BLOOD GLUCOSE: CPT

## 2024-10-08 PROCEDURE — 77412 RADIATION TX DELIVERY LVL 3: CPT | Performed by: RADIOLOGY

## 2024-10-08 PROCEDURE — 94761 N-INVAS EAR/PLS OXIMETRY MLT: CPT

## 2024-10-08 PROCEDURE — 99232 SBSQ HOSP IP/OBS MODERATE 35: CPT | Performed by: INTERNAL MEDICINE

## 2024-10-08 PROCEDURE — 94799 UNLISTED PULMONARY SVC/PX: CPT

## 2024-10-08 PROCEDURE — 77387 GUIDANCE FOR RADJ TX DLVR: CPT | Performed by: RADIOLOGY

## 2024-10-08 PROCEDURE — 63710000001 INSULIN LISPRO (HUMAN) PER 5 UNITS: Performed by: INTERNAL MEDICINE

## 2024-10-08 PROCEDURE — 63710000001 INSULIN GLARGINE PER 5 UNITS: Performed by: HOSPITALIST

## 2024-10-08 PROCEDURE — 99232 SBSQ HOSP IP/OBS MODERATE 35: CPT | Performed by: HOSPITALIST

## 2024-10-08 PROCEDURE — 25010000002 ENOXAPARIN PER 10 MG: Performed by: INTERNAL MEDICINE

## 2024-10-08 RX ORDER — IPRATROPIUM BROMIDE AND ALBUTEROL SULFATE 2.5; .5 MG/3ML; MG/3ML
3 SOLUTION RESPIRATORY (INHALATION)
Status: DISCONTINUED | OUTPATIENT
Start: 2024-10-08 | End: 2024-10-10 | Stop reason: HOSPADM

## 2024-10-08 RX ORDER — ACETAZOLAMIDE SODIUM 500 MG/5ML
500 INJECTION, POWDER, LYOPHILIZED, FOR SOLUTION INTRAVENOUS ONCE
Status: DISCONTINUED | OUTPATIENT
Start: 2024-10-08 | End: 2024-10-10 | Stop reason: HOSPADM

## 2024-10-08 RX ADMIN — PANTOPRAZOLE SODIUM 40 MG: 40 TABLET, DELAYED RELEASE ORAL at 05:47

## 2024-10-08 RX ADMIN — CARVEDILOL 3.12 MG: 3.12 TABLET, FILM COATED ORAL at 17:49

## 2024-10-08 RX ADMIN — DIPHENHYDRAMINE HYDROCHLORIDE AND LIDOCAINE HYDROCHLORIDE AND ALUMINUM HYDROXIDE AND MAGNESIUM HYDRO 5 ML: KIT at 21:45

## 2024-10-08 RX ADMIN — Medication 10 ML: at 09:20

## 2024-10-08 RX ADMIN — ENOXAPARIN SODIUM 40 MG: 100 INJECTION SUBCUTANEOUS at 09:17

## 2024-10-08 RX ADMIN — GUAIFENESIN 1200 MG: 600 TABLET, EXTENDED RELEASE ORAL at 21:45

## 2024-10-08 RX ADMIN — INSULIN LISPRO 6 UNITS: 100 INJECTION, SOLUTION INTRAVENOUS; SUBCUTANEOUS at 12:25

## 2024-10-08 RX ADMIN — Medication 10 ML: at 21:45

## 2024-10-08 RX ADMIN — MIDODRINE HYDROCHLORIDE 5 MG: 5 TABLET ORAL at 17:49

## 2024-10-08 RX ADMIN — ASPIRIN 81 MG: 81 TABLET, COATED ORAL at 09:17

## 2024-10-08 RX ADMIN — MIDODRINE HYDROCHLORIDE 5 MG: 5 TABLET ORAL at 09:17

## 2024-10-08 RX ADMIN — MIDODRINE HYDROCHLORIDE 5 MG: 5 TABLET ORAL at 12:25

## 2024-10-08 RX ADMIN — IPRATROPIUM BROMIDE AND ALBUTEROL SULFATE 3 ML: 2.5; .5 SOLUTION RESPIRATORY (INHALATION) at 20:22

## 2024-10-08 RX ADMIN — EMPAGLIFLOZIN 10 MG: 10 TABLET, FILM COATED ORAL at 09:17

## 2024-10-08 RX ADMIN — DIPHENHYDRAMINE HYDROCHLORIDE AND LIDOCAINE HYDROCHLORIDE AND ALUMINUM HYDROXIDE AND MAGNESIUM HYDRO 5 ML: KIT at 09:22

## 2024-10-08 RX ADMIN — IPRATROPIUM BROMIDE AND ALBUTEROL SULFATE 3 ML: 2.5; .5 SOLUTION RESPIRATORY (INHALATION) at 12:14

## 2024-10-08 RX ADMIN — DIPHENHYDRAMINE HYDROCHLORIDE AND LIDOCAINE HYDROCHLORIDE AND ALUMINUM HYDROXIDE AND MAGNESIUM HYDRO 5 ML: KIT at 17:49

## 2024-10-08 RX ADMIN — GUAIFENESIN 1200 MG: 600 TABLET, EXTENDED RELEASE ORAL at 09:17

## 2024-10-08 RX ADMIN — INSULIN LISPRO 4 UNITS: 100 INJECTION, SOLUTION INTRAVENOUS; SUBCUTANEOUS at 21:45

## 2024-10-08 RX ADMIN — INSULIN GLARGINE 10 UNITS: 100 INJECTION, SOLUTION SUBCUTANEOUS at 09:20

## 2024-10-08 RX ADMIN — INSULIN GLARGINE 10 UNITS: 100 INJECTION, SOLUTION SUBCUTANEOUS at 21:45

## 2024-10-08 NOTE — PLAN OF CARE
Goal Outcome Evaluation:      Pt is alert and oriented. Pt currently on 2 L NC. NSR. Pt expected to go for radiation treatment today. Bed in lowest position, call light within reach, and no other requests at this time. Care on-going.

## 2024-10-08 NOTE — PROGRESS NOTES
[facility] Medicine Services  PROGRESS NOTE    Patient Name: Pati SIMON  : 1960  MRN: [id number]    Date of Admission: 2024  Primary Care Physician: Anuradha Toledo APRN    Subjective   Subjective     CC: Dyspnea    HPI: In bed. Denies pain/issues. Tolerating PO.     Objective   Objective     Vital Signs:   Temp:  [97.7 °F (36.5 °C)-98.3 °F (36.8 °C)] 97.8 °F (36.6 °C)  Heart Rate:  [77-95] 88  Resp:  [18-20] 18  BP: (102-115)/(58-75) 102/75  Flow (L/min):  [2] 2     Physical Exam:  NAD, alert  OP clear, dry MM  Neck supple  RRR  Diminished on L  +BS, soft  TRUJILLO  Normal affect  No change in examination from 10/7    Results Reviewed:  LAB RESULTS:      Lab 10/04/24  0553   WBC 10.67   HEMOGLOBIN 10.5*   HEMATOCRIT 33.8*   PLATELETS 422   NEUTROS ABS 8.83*   IMMATURE GRANS (ABS) 0.03   LYMPHS ABS 0.65*   MONOS ABS 1.15*   EOS ABS 0.00   MCV 79.7         Lab 10/06/24  0519 10/04/24  0553 10/03/24  0829 10/02/24  0404   SODIUM 135* 134* 138 134*   POTASSIUM 5.0 5.6* 4.4 4.5   CHLORIDE 94* 94* 97* 94*   CO2 38.0* 35.0* 35.0* 33.0*   ANION GAP 3.0* 5.0 6.0 7.0   BUN 16 19 11 16   CREATININE 0.47* 0.70 0.37* 0.64   EGFR 107.1 97.3 113.5 99.4   GLUCOSE 101* 290* 170* 207*   CALCIUM 8.7 8.8 8.8 8.6         Lab 10/04/24  0553   TOTAL PROTEIN 5.1*   ALBUMIN 2.8*   GLOBULIN 2.3   ALT (SGPT) 7   AST (SGOT) 8   BILIRUBIN <0.2   ALK PHOS 95                           Brief Urine Lab Results       None            Microbiology Results Abnormal       Procedure Component Value - Date/Time    Blood Culture - Blood, Arm, Right [[id number]]  (Normal) Collected: 24 0601    Lab Status: Final result Specimen: Blood from Arm, Right Updated: 10/02/24 0645     Blood Culture No growth at 5 days    Narrative:      Less than seven (7) mL's of blood was collected.  Insufficient quantity may yield false negative results.    Blood Culture - Blood, Arm, Left [[id number]]  (Normal) Collected: 24  0459    Lab Status: Final result Specimen: Blood from Arm, Left Updated: 10/02/24 0645     Blood Culture No growth at 5 days    Narrative:      Less than seven (7) mL's of blood was collected.  Insufficient quantity may yield false negative results.    MRSA Screen, PCR (Inpatient) - Swab, Nares [[id number]]  (Normal) Collected: 09/28/24 1157    Lab Status: Final result Specimen: Swab from Nares Updated: 09/28/24 1413     MRSA PCR Negative    Narrative:      The negative predictive value of this diagnostic test is high and should only be used to consider de-escalating anti-MRSA therapy. A positive result may indicate colonization with MRSA and must be correlated clinically.  MRSA Negative    S. Pneumo Ag Urine or CSF - Urine, Urine, Clean Catch [[id number]]  (Normal) Collected: 09/27/24 1258    Lab Status: Final result Specimen: Urine, Clean Catch Updated: 09/27/24 1909     Strep Pneumo Ag Negative    Legionella Antigen, Urine - Urine, Urine, Clean Catch [[id number]]  (Normal) Collected: 09/27/24 1258    Lab Status: Final result Specimen: Urine, Clean Catch Updated: 09/27/24 1908     LEGIONELLA ANTIGEN, URINE Negative    Narrative:      2025-09-26    Respiratory Panel PCR w/COVID-19(SARS-CoV-2) ARNAV/NAIMA/AUNG/PAD/COR/JE In-House, NP Swab in UTM/VTM, 2 HR TAT - Swab, Nasopharynx [[id number]]  (Normal) Collected: 09/27/24 0524    Lab Status: Final result Specimen: Swab from Nasopharynx Updated: 09/27/24 0621     ADENOVIRUS, PCR Not Detected     Coronavirus 229E Not Detected     Coronavirus HKU1 Not Detected     Coronavirus NL63 Not Detected     Coronavirus OC43 Not Detected     COVID19 Not Detected     Human Metapneumovirus Not Detected     Human Rhinovirus/Enterovirus Not Detected     Influenza A PCR Not Detected     Influenza B PCR Not Detected     Parainfluenza Virus 1 Not Detected     Parainfluenza Virus 2 Not Detected     Parainfluenza Virus 3 Not Detected     Parainfluenza Virus 4 Not Detected     RSV, PCR Not Detected      Bordetella pertussis pcr Not Detected     Bordetella parapertussis PCR Not Detected     Chlamydophila pneumoniae PCR Not Detected     Mycoplasma pneumo by PCR Not Detected    Narrative:      In the setting of a positive respiratory panel with a viral infection PLUS a negative procalcitonin without other underlying concern for bacterial infection, consider observing off antibiotics or discontinuation of antibiotics and continue supportive care. If the respiratory panel is positive for atypical bacterial infection (Bordetella pertussis, Chlamydophila pneumoniae, or Mycoplasma pneumoniae), consider antibiotic de-escalation to target atypical bacterial infection.            No radiology results from the last 24 hrs    Results for orders placed during the hospital encounter of 09/27/24    Adult Transthoracic Echo Complete W/ Cont if Necessary Per Protocol    Interpretation Summary    Left ventricular ejection fraction appears to be less than 20%.    The left ventricular cavity is mild to moderately dilated.    The left atrial cavity is mildly dilated.    Estimated right ventricular systolic pressure from tricuspid regurgitation is mildly elevated (38 mmHg).    There is a left pleural effusion. There is a right pleural effusion.      Current medications:  Scheduled Meds:aspirin, 81 mg, Oral, Daily  carvedilol, 3.125 mg, Oral, BID With Meals  empagliflozin, 10 mg, Oral, Daily  enoxaparin, 40 mg, Subcutaneous, Daily  First Mouthwash (Magic Mouthwash), 5 mL, Swish & Spit, Q6H  furosemide, 20 mg, Oral, Daily  [Held by provider] glipizide, 5 mg, Oral, BID AC  guaiFENesin, 1,200 mg, Oral, Q12H  insulin glargine, 10 Units, Subcutaneous, Q12H  insulin lispro, 2-9 Units, Subcutaneous, 4x Daily AC & at Bedtime  losartan, 25 mg, Oral, Daily  [Held by provider] metFORMIN, 1,000 mg, Oral, BID With Meals  midodrine, 5 mg, Oral, TID AC  pantoprazole, 40 mg, Oral, Q AM  sodium chloride, 10 mL, Intravenous, Q12H      Continuous  "Infusions:       PRN Meds:.  acetaminophen **OR** acetaminophen **OR** acetaminophen    senna-docusate sodium **AND** polyethylene glycol **AND** bisacodyl **AND** bisacodyl    Calcium Replacement - Follow Nurse / BPA Driven Protocol    cyclobenzaprine    dextrose    dextrose    glucagon (human recombinant)    ipratropium-albuterol    Magnesium Standard Dose Replacement - Follow Nurse / BPA Driven Protocol    ondansetron ODT **OR** ondansetron    Phosphorus Replacement - Follow Nurse / BPA Driven Protocol    Potassium Replacement - Follow Nurse / BPA Driven Protocol    sodium chloride    sodium chloride    sodium chloride    Assessment & Plan   Assessment & Plan     Active Hospital Problems    Diagnosis  POA    **Mass of upper lobe of left lung [R91.8]  Yes    Acute HFrEF (heart failure with reduced ejection fraction) [I50.21]  Yes    NICM (nonischemic cardiomyopathy) [I42.8]  Yes    Coronary artery disease involving native coronary artery of native heart without angina pectoris [I25.10]  Yes    HFrEF (heart failure with reduced ejection fraction) [I50.20]  Yes    Type 2 diabetes mellitus with hyperglycemia, without long-term current use of insulin [E11.65]  Yes    Hyperlipidemia LDL goal <70 [E78.5]  Yes      Resolved Hospital Problems    Diagnosis Date Resolved POA    Pneumonia [J18.9] 09/28/2024 Yes    Acute respiratory failure with hypoxia [J96.01] 09/29/2024 Yes    Sepsis [A41.9] 09/28/2024 Yes    Demand ischemia [I24.89] 10/02/2024 Yes    Lactic acid increased [E87.20] 10/02/2024 Yes    Primary hypertension [I10] 10/01/2024 Yes        Brief Hospital Course to date:  Pati SIMON is a 64 y.o. female presenting to ED with SOA. Says she started to feel ill about 1 week ago at which time \"felt like she had a cold going on.\" Over the week continued to feel weaker. Developed SOA which hit suddenly late last night prompting her to come to ED. Feels better now after receiving tx in ED. Denies cough, high " fevers.    Acute decompensated systolic heart failure, improved  Nonischemic cardiomyopathy   Elevated troponin, likely demand ischemia  -ECHO with 20% EF  -s/p diuresis  -CAD, per cardiology, GDMT    Hypotension  -midodrine, and will limited GDMT    Newly diagnosed left lung neoplasm  -CTA chest showed left upper lung neoplasm concerning for malignancy  -Bronchoscopy showed almost complete obstruction of the left mainstem bronchus.  Biopsies obtained  -pathology pending  -RAD/ONC consulted, started XRT 10/4  -possible brain mets per MRI    Possible community-acquired/obstructive pneumonia   Leukocytosis   -completed course of antibiotics      Uncontrolled DM w/ hyperglycemia  -SSI/stable trend     Essential hypertension  Dyslipidemia    Expected Discharge Location and Transportation: Rehab, pending pathology/treatment  Expected Discharge   Expected Discharge Date: 10/10/2024; Expected Discharge Time:      VTE Prophylaxis:  Pharmacologic VTE prophylaxis orders are present.         AM-PAC 6 Clicks Score (PT): 24 (10/07/24 0912)    CODE STATUS:   Code Status and Medical Interventions: CPR (Attempt to Resuscitate); Full Support   Ordered at: 09/27/24 0713     Code Status (Patient has no pulse and is not breathing):    CPR (Attempt to Resuscitate)     Medical Interventions (Patient has pulse or is breathing):    Full Support     Copied text in this note has been reviewed and is accurate as of 10/08/24.     Aaron Krueger MD  10/08/24

## 2024-10-08 NOTE — PROGRESS NOTES
"PULMONARY PROGRESS NOTE    Patient Care Team:  Anuradha Toledo APRN as PCP - General (Urgent Care)  Zarina Simpson MD as Consulting Physician (Hematology)  Alirio De La Cruz DO as Referring Physician (Pulmonary Disease)  Sylvester Cadena MD as Consulting Physician (Radiation Oncology)    Reason for Consult     Lung mass, left upper lobe    Subjective     Interval History:     She underwent her radiation treatment this morning.  She is feeling well with no fatigue or nausea.  Currently on 2 L nasal cannula saturation is 97%.  She remains afebrile.    Review of Systems:     ROS negative except for: Short of air, cough,      Objective     Vital Signs  Blood pressure 94/58, pulse 88, temperature 97.8 °F (36.6 °C), temperature source Oral, resp. rate 18, height 170.2 cm (67\"), weight 62.5 kg (137 lb 12.8 oz), SpO2 97%.    Physical Exam:    Physical Exam  Constitutional:       General: She is not in acute distress.  HENT:      Head: Normocephalic and atraumatic.      Nose: No congestion.      Mouth/Throat:      Pharynx: Oropharynx is clear.   Eyes:      Conjunctiva/sclera: Conjunctivae normal.   Cardiovascular:      Rate and Rhythm: Normal rate.      Heart sounds: Normal heart sounds.   Pulmonary:      Breath sounds: No wheezing.      Comments: Diminished chest excursion and diminished breath sounds on the left  Abdominal:      General: Bowel sounds are normal.      Palpations: Abdomen is soft.   Musculoskeletal:      Right lower leg: No edema.      Left lower leg: No edema.   Lymphadenopathy:      Cervical: No cervical adenopathy.   Skin:     General: Skin is warm and dry.   Neurological:      Mental Status: She is alert and oriented to person, place, and time.           Results Review:    Lab Results (last 24 hours)       Procedure Component Value Units Date/Time    Tissue Pathology Exam [[id number]] Collected: 10/03/24 1457    Specimen: Tissue from Lung, L Updated: 10/08/24 9950     Case Report -- " "    Surgical Pathology Report                         Case: [id number]                                  Authorizing Provider:  Jono Kiloaicha       Collected:           10/03/2024 02:57 PM                                 DO Gerardo                                                                    Ordering Location:     [facility]   Received:            10/04/2024 10:04 AM                                 ENDO SUITES                                                                  Pathologist:           Jesu Asencio MD                                                        Specimen:    Lung, L, Left lung endobronchial mass bx for pathology                                      Clinical Information --     Endobronchial mass         Final Diagnosis --     LEFT LUNG, ENDOBRONCHIAL BIOPSY:  Squamous cell carcinoma (see comment).       Comment --     Biopsy show a non-small cell lung carcinoma with features on routine stains suggestive of squamous differentiation with broad areas of necrosis as well as apparent keratinization.  Immunohistochemical staining was undertaken for further classification.  P40 stains shows strong positivity in the neoplastic cells with no significant staining for TTF-1 supporting interpretation as squamous cell carcinoma.       Gross Description --     1. Lung, L.  Received in formalin labeled \"left lung endobronchial mass biopsy for pathology\" is an aggregate of tan-white tan-brown soft tissue fragments measuring 1.3 x 1.0 x 0.3 cm.  The specimen is filtered and submitted entirely in 1A. [place]         Microscopic Description --     The slides are reviewed and demonstrate histopathologic features supporting the above rendered diagnosis.        POC Glucose Once [[id number]]  (Abnormal) Collected: 10/08/24 0729    Specimen: Blood Updated: 10/08/24 0733     Glucose 146 mg/dL     POC Glucose Once [[id number]]  (Abnormal) Collected: 10/07/24 2124    Specimen: Blood Updated: " 10/07/24 2126     Glucose 203 mg/dL     POC Glucose Once [[id number]]  (Abnormal) Collected: 10/07/24 1650    Specimen: Blood Updated: 10/07/24 1657     Glucose 240 mg/dL     [facility] (JE,COR,MAD,NAIMA) [[id number]] Collected: 10/03/24 1510    Specimen: Tissue from Lymph Node Updated: 10/07/24 1246     Reference Lab Report --     [facility]  [address] [place] [address]  Phone: [phone] or [phone]  Fax: [phone]  Claude Chaney M.D., Medical Director    PATIENT NAME                                 LABORATORY NO.  SUKHDEEP FORDE                              [id number]  [id number]                                     AGE                SEX     SSN            CLIENT REF #  [facility]                       64       1960   F       [id number]    [id number]  [address]                          REQUESTING M.D.       ATTENDING MMINNIE.        [place] [address]                            IRA CONTRERAS  DATE COLLECTED        DATE RECEIVED          DATE REPORTED  10/03/2024            10/04/2024             10/07/2024    DIAGNOSIS:  TBNA, STATION 7:  Negative for malignant cells.    MICROSCOPIC DESCRIPTION:  The specimen shows a polymorphic lymphoid population.  The findings are most  consistent with a benign reactive lymphadenopathy; however, if the node is large,  continues to increase in size or fails to respond to antibiotics, excision to  definitely exclude a low grade lymphoma or Hodgkin's lymphoma is  recommended.  Unfixed tissue should be submitted in transport media for  immunophenotyping by flow cytometry.    Professional interpretation rendered by Randy Richardson M.D., F.C.A.P. at [facility], [address] [place] [address].    CLINICAL HISTORY:  Endobronchial mass  Acute respiratory failure with hypoxia and hypercapnia  Acute kidney injury  Hyperglycemia  Severe persistent asthma  with exacerbation    SPECIMENS SUBMITTED:  TBNA, STATION 7    GROSS SPECIMEN DESCRIPTION:  30ccs cloudy serosanguineous fluid with scant sediment received in fixative  Cell block has been examined.    REVIEWED, DIAGNOSED AND ELECTRONICALLY  SIGNED BY:    Randy Richardson M.D., F.C.A.P.  CPT CODES:  06343, 05071      POC Glucose Once [[id number]]  (Abnormal) Collected: 10/07/24 1156    Specimen: Blood Updated: 10/07/24 1158     Glucose 226 mg/dL           Imaging Results (Last 24 Hours)       ** No results found for the last 24 hours. **             aspirin, 81 mg, Oral, Daily  carvedilol, 3.125 mg, Oral, BID With Meals  empagliflozin, 10 mg, Oral, Daily  enoxaparin, 40 mg, Subcutaneous, Daily  First Mouthwash (Magic Mouthwash), 5 mL, Swish & Spit, Q6H  furosemide, 20 mg, Oral, Daily  [Held by provider] glipizide, 5 mg, Oral, BID AC  guaiFENesin, 1,200 mg, Oral, Q12H  insulin glargine, 10 Units, Subcutaneous, Q12H  insulin lispro, 2-9 Units, Subcutaneous, 4x Daily AC & at Bedtime  losartan, 25 mg, Oral, Daily  [Held by provider] metFORMIN, 1,000 mg, Oral, BID With Meals  midodrine, 5 mg, Oral, TID AC  pantoprazole, 40 mg, Oral, Q AM  sodium chloride, 10 mL, Intravenous, Q12H          Mass of upper lobe of left lung    Type 2 diabetes mellitus with hyperglycemia, without long-term current use of insulin    Hyperlipidemia LDL goal <70    HFrEF (heart failure with reduced ejection fraction)    Coronary artery disease involving native coronary artery of native heart without angina pectoris    NICM (nonischemic cardiomyopathy)    Acute HFrEF (heart failure with reduced ejection fraction)      Assessment & Plan     64-year-old woman with diabetes, hypertension, presenting with shortness of air and found to have a large left upper lobe mass as well as a left ventricular ejection fraction of 20%.  After treatment CT coronary angiogram revealed some moderate coronary disease and an EF improved to 43%.      She underwent  bronchoscopy October 3, 2024, revealing 90% obstructing mass in the left mainstem bronchus.  Primary mass was biopsied as well as the subcarinal stage VII lymph node.  Pathology of the primary mass is squamous cell carcinoma.  Lymph node biopsy was negative for lung cancer.  She is receiving radiation therapy for her subtotal occlusion of the left mainstem bronchus.  I anticipate that oncology will add chemotherapy.  She will need an outpatient PET scan for staging.  Once she completes therapy hopefully we can restage and consider pneumonectomy if pulmonary function adequate.  I did discuss pathology with the patient.    Wheezing has resolved and secretions are improved.  She is currently on 2 L cannula with a saturation of 97%.  Her blood pressure was soft this morning and I would consider holding her diuretics.      -Add as needed nebulizer treatments for her COPD  -Arrange a home nebulizer for DuoNeb twice daily and as needed  -Diamox for worsening contraction alkalosis  -Hold furosemide  -Maintain oxygen saturation greater than 88%  -Continue radiation  -Oncology consult      Mary Lopez MD  10/08/24  11:11 EDT      Time: 30min

## 2024-10-09 ENCOUNTER — ANESTHESIA EVENT (OUTPATIENT)
Dept: PERIOP | Facility: HOSPITAL | Age: 64
End: 2024-10-09
Payer: MEDICAID

## 2024-10-09 ENCOUNTER — PATIENT OUTREACH (OUTPATIENT)
Dept: ONCOLOGY | Facility: CLINIC | Age: 64
End: 2024-10-09
Payer: MEDICAID

## 2024-10-09 LAB
GLUCOSE BLDC GLUCOMTR-MCNC: 143 MG/DL (ref 70–130)
GLUCOSE BLDC GLUCOMTR-MCNC: 208 MG/DL (ref 70–130)
GLUCOSE BLDC GLUCOMTR-MCNC: 223 MG/DL (ref 70–130)
GLUCOSE BLDC GLUCOMTR-MCNC: 308 MG/DL (ref 70–130)

## 2024-10-09 PROCEDURE — 99232 SBSQ HOSP IP/OBS MODERATE 35: CPT | Performed by: INTERNAL MEDICINE

## 2024-10-09 PROCEDURE — 63710000001 INSULIN GLARGINE PER 5 UNITS: Performed by: HOSPITALIST

## 2024-10-09 PROCEDURE — 63710000001 INSULIN LISPRO (HUMAN) PER 5 UNITS: Performed by: INTERNAL MEDICINE

## 2024-10-09 PROCEDURE — 77387 GUIDANCE FOR RADJ TX DLVR: CPT | Performed by: RADIOLOGY

## 2024-10-09 PROCEDURE — 25010000002 ENOXAPARIN PER 10 MG: Performed by: INTERNAL MEDICINE

## 2024-10-09 PROCEDURE — 82948 REAGENT STRIP/BLOOD GLUCOSE: CPT

## 2024-10-09 PROCEDURE — 94664 DEMO&/EVAL PT USE INHALER: CPT

## 2024-10-09 PROCEDURE — 77412 RADIATION TX DELIVERY LVL 3: CPT | Performed by: RADIOLOGY

## 2024-10-09 PROCEDURE — 94799 UNLISTED PULMONARY SVC/PX: CPT

## 2024-10-09 PROCEDURE — 94761 N-INVAS EAR/PLS OXIMETRY MLT: CPT

## 2024-10-09 PROCEDURE — 99232 SBSQ HOSP IP/OBS MODERATE 35: CPT | Performed by: HOSPITALIST

## 2024-10-09 RX ORDER — FAMOTIDINE 10 MG/ML
20 INJECTION, SOLUTION INTRAVENOUS ONCE
OUTPATIENT
Start: 2024-10-24

## 2024-10-09 RX ORDER — SODIUM CHLORIDE 0.9 % (FLUSH) 0.9 %
10 SYRINGE (ML) INJECTION EVERY 12 HOURS SCHEDULED
Status: CANCELLED | OUTPATIENT
Start: 2024-10-09

## 2024-10-09 RX ORDER — FAMOTIDINE 20 MG/1
20 TABLET, FILM COATED ORAL ONCE
Status: CANCELLED | OUTPATIENT
Start: 2024-10-09 | End: 2024-10-09

## 2024-10-09 RX ORDER — SODIUM CHLORIDE 9 MG/ML
20 INJECTION, SOLUTION INTRAVENOUS ONCE
OUTPATIENT
Start: 2024-10-24

## 2024-10-09 RX ORDER — FAMOTIDINE 10 MG/ML
20 INJECTION, SOLUTION INTRAVENOUS AS NEEDED
OUTPATIENT
Start: 2024-10-24

## 2024-10-09 RX ORDER — DIPHENHYDRAMINE HYDROCHLORIDE 50 MG/ML
50 INJECTION INTRAMUSCULAR; INTRAVENOUS AS NEEDED
OUTPATIENT
Start: 2024-10-24

## 2024-10-09 RX ORDER — PALONOSETRON 0.05 MG/ML
0.25 INJECTION, SOLUTION INTRAVENOUS ONCE
OUTPATIENT
Start: 2024-10-24

## 2024-10-09 RX ADMIN — CARVEDILOL 3.12 MG: 3.12 TABLET, FILM COATED ORAL at 18:00

## 2024-10-09 RX ADMIN — ASPIRIN 81 MG: 81 TABLET, COATED ORAL at 08:42

## 2024-10-09 RX ADMIN — ENOXAPARIN SODIUM 40 MG: 100 INJECTION SUBCUTANEOUS at 08:42

## 2024-10-09 RX ADMIN — Medication 10 ML: at 08:43

## 2024-10-09 RX ADMIN — GUAIFENESIN 1200 MG: 600 TABLET, EXTENDED RELEASE ORAL at 21:52

## 2024-10-09 RX ADMIN — DIPHENHYDRAMINE HYDROCHLORIDE AND LIDOCAINE HYDROCHLORIDE AND ALUMINUM HYDROXIDE AND MAGNESIUM HYDRO 5 ML: KIT at 08:43

## 2024-10-09 RX ADMIN — EMPAGLIFLOZIN 10 MG: 10 TABLET, FILM COATED ORAL at 08:42

## 2024-10-09 RX ADMIN — INSULIN LISPRO 4 UNITS: 100 INJECTION, SOLUTION INTRAVENOUS; SUBCUTANEOUS at 21:51

## 2024-10-09 RX ADMIN — IPRATROPIUM BROMIDE AND ALBUTEROL SULFATE 3 ML: 2.5; .5 SOLUTION RESPIRATORY (INHALATION) at 09:15

## 2024-10-09 RX ADMIN — IPRATROPIUM BROMIDE AND ALBUTEROL SULFATE 3 ML: 2.5; .5 SOLUTION RESPIRATORY (INHALATION) at 19:43

## 2024-10-09 RX ADMIN — GUAIFENESIN 1200 MG: 600 TABLET, EXTENDED RELEASE ORAL at 08:42

## 2024-10-09 RX ADMIN — ACETAMINOPHEN 650 MG: 325 TABLET ORAL at 18:09

## 2024-10-09 RX ADMIN — INSULIN LISPRO 7 UNITS: 100 INJECTION, SOLUTION INTRAVENOUS; SUBCUTANEOUS at 12:48

## 2024-10-09 RX ADMIN — MIDODRINE HYDROCHLORIDE 5 MG: 5 TABLET ORAL at 05:47

## 2024-10-09 RX ADMIN — CARVEDILOL 3.12 MG: 3.12 TABLET, FILM COATED ORAL at 08:42

## 2024-10-09 RX ADMIN — DIPHENHYDRAMINE HYDROCHLORIDE AND LIDOCAINE HYDROCHLORIDE AND ALUMINUM HYDROXIDE AND MAGNESIUM HYDRO 5 ML: KIT at 21:52

## 2024-10-09 RX ADMIN — INSULIN LISPRO 2 UNITS: 100 INJECTION, SOLUTION INTRAVENOUS; SUBCUTANEOUS at 18:00

## 2024-10-09 RX ADMIN — INSULIN GLARGINE 10 UNITS: 100 INJECTION, SOLUTION SUBCUTANEOUS at 21:51

## 2024-10-09 RX ADMIN — MIDODRINE HYDROCHLORIDE 5 MG: 5 TABLET ORAL at 18:00

## 2024-10-09 RX ADMIN — MIDODRINE HYDROCHLORIDE 5 MG: 5 TABLET ORAL at 12:48

## 2024-10-09 RX ADMIN — INSULIN GLARGINE 10 UNITS: 100 INJECTION, SOLUTION SUBCUTANEOUS at 08:42

## 2024-10-09 RX ADMIN — PANTOPRAZOLE SODIUM 40 MG: 40 TABLET, DELAYED RELEASE ORAL at 05:47

## 2024-10-09 RX ADMIN — Medication 10 ML: at 21:52

## 2024-10-09 RX ADMIN — DIPHENHYDRAMINE HYDROCHLORIDE AND LIDOCAINE HYDROCHLORIDE AND ALUMINUM HYDROXIDE AND MAGNESIUM HYDRO 5 ML: KIT at 18:00

## 2024-10-09 NOTE — PROGRESS NOTES
"HEMATOLOGY/ONCOLOGY PROGRESS NOTE    S: Undergoing palliative radiation.  Clinically doing a little better.        Past medical history, social history and family history was reviewed and unchanged from prior visit.    Review of Systems:    Review of Systems         Medications:  The current medication list was reviewed in the EMR    ALLERGIES:    Allergies   Allergen Reactions    Codeine Irritability     jittering    Lipitor [Atorvastatin] Myalgia    Rosuvastatin Nausea Only         Physical Exam    VITAL SIGNS:  BP 95/66 (BP Location: Left arm, Patient Position: Sitting)   Pulse 83   Temp 97.7 °F (36.5 °C) (Oral)   Resp 18   Ht 170.2 cm (67\")   Wt 59.5 kg (131 lb 3.2 oz)   SpO2 95%   BMI 20.55 kg/m²   Temp:  [97.7 °F (36.5 °C)-98.4 °F (36.9 °C)] 97.7 °F (36.5 °C)      Performance Status: 2                Physical Exam    General: well appearing, in no acute distress  HEENT: sclerae anicteric, neck is supple  Lymphatics: no cervical, supraclavicular, or axillary adenopathy  Extremities: no lower extremity edema  Skin: no rashes, lesions, bruising, or petechiae  Msk:  Shows no weakness of the large muscle groups  Psych: Mood is stable        RECENT LABS:    Lab Results   Component Value Date    HGB 10.5 (L) 10/04/2024    HCT 33.8 (L) 10/04/2024    MCV 79.7 10/04/2024     10/04/2024    WBC 10.67 10/04/2024    NEUTROABS 8.83 (H) 10/04/2024    LYMPHSABS 0.65 (L) 10/04/2024    MONOSABS 1.15 (H) 10/04/2024    EOSABS 0.00 10/04/2024    BASOSABS 0.01 10/04/2024       Lab Results   Component Value Date    GLUCOSE 101 (H) 10/06/2024    BUN 16 10/06/2024    CREATININE 0.47 (L) 10/06/2024     (L) 10/06/2024    K 5.0 10/06/2024    CL 94 (L) 10/06/2024    CO2 38.0 (H) 10/06/2024    CALCIUM 8.7 10/06/2024    PROTEINTOT 5.1 (L) 10/04/2024    ALBUMIN 2.8 (L) 10/04/2024    BILITOT <0.2 10/04/2024    ALKPHOS 95 10/04/2024    AST 8 10/04/2024    ALT 7 10/04/2024     Lab Results   Component Value Date    FINALDX  " 10/03/2024     LEFT LUNG, ENDOBRONCHIAL BIOPSY:  Squamous cell carcinoma (see comment).     ]      Assessment/Plan    1.  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  She is undergoing palliative radiation to help open up the airway.  From my standpoint she can be discharged home.  I am planning to start her on chemotherapy next week with carboplatin Taxol and Opdivo.  I am going to treat her neoadjuvantly to see if we can shrink her disease adequately to undergo surgery.  If she is not a surgical candidate plan for radiotherapy for stage III disease.  Her disease is fairly extensive and she is going to need a PET scan to make sure she does not have distant disease present.  Will schedule that as an outpatient.  She will see my nurse practitioner next week and start treatment.  Will try to get a port placed if possible.            Zarina Simpson MD  Psychiatric Hematology and Oncology    10/9/2024

## 2024-10-09 NOTE — CONSULTS
General Surgery Consultation Note    Date of Service: 10/9/2024  Pati SIMON  [id number]  1960      Referring Provider: Aaron Krueger MD    Location of Consult: Inpatient     Reason for Consultation: Lung cancer       History of Present Illness:  I am seeing, Pati SIMON, in consultation for Aaron Krueger MD regarding squamous cell lung cancer requiring chemotherapy thus vascular access.  64-year-old lady with a known malignancy requires chemotherapy.  Her main complaints are that of weakness and shortness of breath.  She denies any fever, chills, nausea, or vomiting.    Problems Addressed this Visit          Hematology and Neoplasia    Cancer of bronchus of left upper lobe    Relevant Medications    guaiFENesin (MUCINEX) 12 hr tablet 1,200 mg    ipratropium-albuterol (DUO-NEB) nebulizer solution 3 mL    Other Relevant Orders    CBC and Differential    Comprehensive metabolic panel    TSH    T4, free    Lab Appointment Request    Clinic Appointment Request    Infusion Appointment Request 3       Pulmonary and Pneumonias    RESOLVED: Pneumonia    Relevant Medications    guaiFENesin (MUCINEX) 12 hr tablet 1,200 mg    ipratropium-albuterol (DUO-NEB) nebulizer solution 3 mL     Other Visit Diagnoses       Acute respiratory failure with hypoxia and hypercapnia    -  Primary    Acute kidney injury        Relevant Medications    furosemide (LASIX) tablet 20 mg    acetaZOLAMIDE 500 mg injection (Completed)    acetaZOLAMIDE 500 mg injection    Hyperglycemia        Severe persistent asthma with exacerbation        Relevant Medications    ipratropium-albuterol (DUO-NEB) nebulizer solution 3 mL (Completed)    ipratropium-albuterol (DUO-NEB) nebulizer solution 3 mL (Completed)    ipratropium-albuterol (DUO-NEB) nebulizer solution 3 mL    Endobronchial mass        Relevant Medications    ipratropium-albuterol (DUO-NEB) nebulizer solution 3 mL (Completed)    ipratropium-albuterol (DUO-NEB) nebulizer solution 3 mL  (Completed)    guaiFENesin (MUCINEX) 12 hr tablet 1,200 mg    ipratropium-albuterol (DUO-NEB) nebulizer solution 3 mL    Other Relevant Orders    Tissue Pathology Exam (Completed)    NON-GYN CYTOLOGY, [facility] ([facility]) (Completed)          Diagnoses         Codes Comments    Acute respiratory failure with hypoxia and hypercapnia    -  Primary ICD-10-CM: J96.01, J96.02  ICD-9-CM: 518.81     Pneumonia of left upper lobe due to infectious organism     ICD-10-CM: J18.9  ICD-9-CM: 486     Acute kidney injury     ICD-10-CM: N17.9  ICD-9-CM: 584.9     Hyperglycemia     ICD-10-CM: R73.9  ICD-9-CM: 790.29     Severe persistent asthma with exacerbation     ICD-10-CM: J45.51  ICD-9-CM: 493.92     Endobronchial mass     ICD-10-CM: R91.8  ICD-9-CM: 786.6     Cancer of bronchus of left upper lobe     ICD-10-CM: C34.12  ICD-9-CM: 162.3             Past Medical History:   Diagnosis Date    Diabetes mellitus     GERD (gastroesophageal reflux disease)     Hypertension     Pneumonia     Urinary tract infection     Visual impairment        Past Surgical History:    BRONCHOSCOPY    Procedure: BRONCHOSCOPY WITH ENDOBRONCHIAL ULTRASOUND;  Surgeon: Alirio De La Cruz DO;  Location: [facility] ENDOSCOPY;  Service: Pulmonary;  Laterality: N/A;  EBUS scope removed with balloon intact.     SECTION    4       Allergies   Allergen Reactions    Codeine Irritability     jittering    Lipitor [Atorvastatin] Myalgia    Rosuvastatin Nausea Only       No current facility-administered medications on file prior to encounter.     Current Outpatient Medications on File Prior to Encounter   Medication Sig Dispense Refill    albuterol sulfate  (90 Base) MCG/ACT inhaler Inhale 2 puffs Every 4 (Four) Hours As Needed for Wheezing. 18 g 11    amLODIPine (NORVASC) 10 MG tablet Take 1 tablet by mouth Daily. 90 tablet 1    aspirin 81 MG EC tablet Take 1 tablet by mouth Daily.      Calcium Citrate-Vitamin D3 (CITRACAL) 315-6.25 MG-MCG  tablet tablet Take  by mouth Daily.      cyclobenzaprine (FLEXERIL) 5 MG tablet Take 1 tablet by mouth 3 (Three) Times a Day As Needed for Muscle Spasms. 30 tablet 0    ezetimibe (Zetia) 10 MG tablet Take 1 tablet by mouth Daily. 90 tablet 1    glipizide (GLUCOTROL XL) 10 MG 24 hr tablet TAKE 1 TABLET BY MOUTH DAILY 90 tablet 1    hydroCHLOROthiazide 25 MG tablet TAKE 1 TABLET BY MOUTH DAILY 90 tablet 1    losartan (COZAAR) 25 MG tablet TAKE 1 TABLET BY MOUTH DAILY 90 tablet 1    metFORMIN (GLUCOPHAGE) 1000 MG tablet Take 1 tablet by mouth 2 (Two) Times a Day With Meals. 180 tablet 1    metoprolol tartrate (LOPRESSOR) 50 MG tablet Take 1 tablet by mouth 2 (Two) Times a Day. 180 tablet 1    nicotine polacrilex (Nicorette) 4 MG gum Chew 1 each Every 2 (Two) Hours As Needed for Smoking Cessation. 220 each 1    omeprazole (priLOSEC) 20 MG capsule Take 1 capsule by mouth Daily. 90 capsule 1    SITagliptin (Januvia) 50 MG tablet Take 1 tablet by mouth Daily. 90 tablet 1         Current Facility-Administered Medications:     acetaminophen (TYLENOL) tablet 650 mg, 650 mg, Oral, Q4H PRN **OR** acetaminophen (TYLENOL) 160 MG/5ML oral solution 650 mg, 650 mg, Oral, Q4H PRN **OR** acetaminophen (TYLENOL) suppository 650 mg, 650 mg, Rectal, Q4H PRN, Yasmeen Corrales M II, DO    acetaZOLAMIDE 500 mg injection, 500 mg, Intravenous, Once, Mary Lopez MD    aspirin EC tablet 81 mg, 81 mg, Oral, Daily, Yasmeen Corrales M II, DO, 81 mg at 10/09/24 0842    sennosides-docusate (PERICOLACE) 8.6-50 MG per tablet 2 tablet, 2 tablet, Oral, BID PRN **AND** polyethylene glycol (MIRALAX) packet 17 g, 17 g, Oral, Daily PRN **AND** bisacodyl (DULCOLAX) EC tablet 5 mg, 5 mg, Oral, Daily PRN **AND** bisacodyl (DULCOLAX) suppository 10 mg, 10 mg, Rectal, Daily PRN, Yasmeen Corrales II, DO    Calcium Replacement - Follow Nurse / BPA Driven Protocol, , Does not apply, PRN, Yasmeen Corrales II, DO    carvedilol (COREG) tablet 3.125 mg, 3.125  mg, Oral, BID With Meals, Cony Gomez, APRN, 3.125 mg at 10/09/24 0842    cyclobenzaprine (FLEXERIL) tablet 5 mg, 5 mg, Oral, TID PRN, Yasmeen Corrales II, DO    dextrose (D50W) (25 g/50 mL) IV injection 25 g, 25 g, Intravenous, Q15 Min PRN, Yasmeen Corrales II, DO    dextrose (GLUTOSE) oral gel 15 g, 15 g, Oral, Q15 Min PRN, Yasmeen Corrales II, DO    empagliflozin (JARDIANCE) tablet 10 mg, 10 mg, Oral, Daily, Romana Whiting MD, 10 mg at 10/09/24 0842    Enoxaparin Sodium (LOVENOX) syringe 40 mg, 40 mg, Subcutaneous, Daily, Yasmeen Corrales II, DO, 40 mg at 10/09/24 0842    First Mouthwash (Magic Mouthwash) 5 mL, 5 mL, Swish & Spit, Q6H, Shira Arce, APRN, 5 mL at 10/09/24 0843    [Held by provider] furosemide (LASIX) tablet 20 mg, 20 mg, Oral, Daily, Allegra Gilliam PA-C, 20 mg at 10/07/24 0910    [Held by provider] glipizide (GLUCOTROL) tablet 5 mg, 5 mg, Oral, BID AC, Romana Whiting MD, 5 mg at 10/06/24 0821    glucagon (GLUCAGEN) injection 1 mg, 1 mg, Intramuscular, Q15 Min PRN, Yasmeen Corrales II, DO    guaiFENesin (MUCINEX) 12 hr tablet 1,200 mg, 1,200 mg, Oral, Q12H, Jose L Davis MD, 1,200 mg at 10/09/24 0842    insulin glargine (LANTUS, SEMGLEE) injection 10 Units, 10 Units, Subcutaneous, Q12H, Romana Whiting MD, 10 Units at 10/09/24 0842    Insulin Lispro (humaLOG) injection 2-9 Units, 2-9 Units, Subcutaneous, 4x Daily AC & at Bedtime, Yasmeen Corrales II, DO, 7 Units at 10/09/24 1248    ipratropium-albuterol (DUO-NEB) nebulizer solution 3 mL, 3 mL, Nebulization, BID - RT, Mary Lopez MD, 3 mL at 10/09/24 0915    losartan (COZAAR) tablet 25 mg, 25 mg, Oral, Daily, Romana Whiting MD, 25 mg at 10/07/24 0910    Magnesium Standard Dose Replacement - Follow Nurse / BPA Driven Protocol, , Does not apply, PRN, Yasmeen Corrales II, DO    [Held by provider] metFORMIN (GLUCOPHAGE) tablet 1,000 mg, 1,000 mg, Oral, BID With Meals, Romana Whiting MD, 1,000 mg at 10/06/24 0821     midodrine (PROAMATINE) tablet 5 mg, 5 mg, Oral, TID AC, Jose L Davis MD, 5 mg at 10/09/24 1248    ondansetron ODT (ZOFRAN-ODT) disintegrating tablet 4 mg, 4 mg, Oral, Q6H PRN **OR** ondansetron (ZOFRAN) injection 4 mg, 4 mg, Intravenous, Q6H PRN, Antoni, Yasmeen M II, DO    pantoprazole (PROTONIX) EC tablet 40 mg, 40 mg, Oral, Q AM, Antoni, Yasmeen M II, DO, 40 mg at 10/09/24 0547    Pharmacy Consult - MT, , Does not apply, Daily, Trina Washington, [facility]    Phosphorus Replacement - Follow Nurse / BPA Driven Protocol, , Does not apply, PRN, Antoni, Yasmeen M II, DO    Potassium Replacement - Follow Nurse / BPA Driven Protocol, , Does not apply, PRN, Antoni, Yasmeen M II, DO    sodium chloride 0.9 % flush 10 mL, 10 mL, Intravenous, PRN, Chente Moore MD    sodium chloride 0.9 % flush 10 mL, 10 mL, Intravenous, Q12H, Antoni, Yasmeen M II, DO, 10 mL at 10/09/24 0843    sodium chloride 0.9 % flush 10 mL, 10 mL, Intravenous, PRN, Antoni, Yasmeen M II, DO    sodium chloride 0.9 % infusion 40 mL, 40 mL, Intravenous, PRN, Antoni, Yasmeen M II, DO    Family History   Problem Relation Age of Onset    Diabetes Mother     Heart attack Mother 56    Diabetes Father     Heart attack Father     Diabetes Sister     Suicidality Maternal Grandmother     Heart attack Maternal Grandfather     Stroke Paternal Grandmother     Diabetes Paternal Grandmother     Alcohol abuse Paternal Grandfather     Heart disease Paternal Grandfather      Social History     Socioeconomic History    Marital status:    Tobacco Use    Smoking status: Every Day     Current packs/day: 1.00     Average packs/day: 1 pack/day for 42.8 years (42.8 ttl pk-yrs)     Types: Cigarettes     Start date: 1/1/1982     Passive exposure: Never    Smokeless tobacco: Never   Vaping Use    Vaping status: Never Used   Substance and Sexual Activity    Alcohol use: Not Currently     Alcohol/week: 1.0 standard drink of alcohol     Types: 1 Glasses of wine per week     " Comment: rare    Drug use: Not Currently     Types: Marijuana     Comment: occ    Sexual activity: Not Currently     Partners: Male     Birth control/protection: Post-menopausal       Review of Systems:  Review of Systems   Constitutional:  Positive for fatigue. Negative for chills and fever.   HENT:  Negative for congestion and ear pain.    Eyes:  Negative for photophobia and visual disturbance.   Respiratory:  Negative for apnea, cough and stridor.    Cardiovascular:  Negative for chest pain and leg swelling.   Gastrointestinal:  Negative for abdominal pain, nausea and vomiting.   Endocrine: Negative for polyphagia and polyuria.   Genitourinary:  Negative for difficulty urinating, dysuria and urgency.   Musculoskeletal:  Negative for arthralgias and myalgias.   Skin:  Negative for color change and pallor.   Allergic/Immunologic: Negative for immunocompromised state.   Neurological:  Negative for dizziness, tremors and numbness.   Hematological:  Negative for adenopathy.   Psychiatric/Behavioral:  Negative for agitation, dysphoric mood and self-injury.      Otherwise the 12 point review of systems is negative.    BP 95/66 (BP Location: Left arm, Patient Position: Sitting)   Pulse 83   Temp 97.7 °F (36.5 °C) (Oral)   Resp 18   Ht 170.2 cm (67\")   Wt 59.5 kg (131 lb 3.2 oz)   SpO2 95%   BMI 20.55 kg/m²   Body mass index is 20.55 kg/m².    General: Sitting on the side of the bed pleasantly conversant  HEENT: PER, no icterus, normal sclerae  Cardiac: regular rhythm,  no audible rubs  Pulmonary: bilateral breath sounds, nonlabored  Abdominal: Soft, nontender, nondistended, no peritonitis  Neurologic: awake, alert, no obvious focal deficits  Extremities: warm, no edema  Skin: no obvious rashes nor worrisome lesions seen     CBC  Results from last 7 days   Lab Units 10/04/24  0553   WBC 10*3/mm3 10.67   HEMOGLOBIN g/dL 10.5*   HEMATOCRIT % 33.8*   PLATELETS 10*3/mm3 422       CMP  Results from last 7 days   Lab " Units 10/06/24  0519 10/04/24  0553   SODIUM mmol/L 135* 134*   POTASSIUM mmol/L 5.0 5.6*   CHLORIDE mmol/L 94* 94*   CO2 mmol/L 38.0* 35.0*   BUN mg/dL 16 19   CREATININE mg/dL 0.47* 0.70   CALCIUM mg/dL 8.7 8.8   BILIRUBIN mg/dL  --  <0.2   ALK PHOS U/L  --  95   ALT (SGPT) U/L  --  7   AST (SGOT) U/L  --  8   GLUCOSE mg/dL 101* 290*     Assessment:  Squamous cell lung cancer  Nonischemic cardiomyopathy  Coronary artery disease diabetes mellitus type 2    Plan:  Left lobe lung cancer requiring chemotherapy.  I discussed the associated risks and benefits of port placement including, but not limited to: Bleeding, infection, injury to adjacent viscera (carotid, lung, myocardium excetra), pneumothorax, nonfunction, need for reintervention, and medical issues from a cardiopulmonary and deep venous thrombosis standpoint.  She understands these risks and wishes to proceed.  N.p.o. after midnight.    Claude Dickens MD  10/09/24  16:41 EDT

## 2024-10-09 NOTE — PROGRESS NOTES
[facility] Medicine Services  PROGRESS NOTE    Patient Name: Pati SIMON  : 1960  MRN: [id number]    Date of Admission: 2024  Primary Care Physician: Anuradha Toledo APRN    Subjective   Subjective     CC: Dyspnea    HPI: In bed. Sitting up. Eating. Denies pain. Tolerating radiation.     Objective   Objective     Vital Signs:   Temp:  [97.8 °F (36.6 °C)-98.3 °F (36.8 °C)] 98.3 °F (36.8 °C)  Heart Rate:  [81-93] 81  Resp:  [16-19] 18  BP: ()/(58-75) 103/66  Flow (L/min):  [2] 2     Physical Exam:  NAD, alert  OP clear, dry MM  Neck supple  RRR  Diminished on L  +BS, soft  TRUJILLO  Normal affect  No change in examination from 10/8    Results Reviewed:  LAB RESULTS:      Lab 10/04/24  0553   WBC 10.67   HEMOGLOBIN 10.5*   HEMATOCRIT 33.8*   PLATELETS 422   NEUTROS ABS 8.83*   IMMATURE GRANS (ABS) 0.03   LYMPHS ABS 0.65*   MONOS ABS 1.15*   EOS ABS 0.00   MCV 79.7         Lab 10/06/24  0519 10/04/24  0553 10/03/24  0829   SODIUM 135* 134* 138   POTASSIUM 5.0 5.6* 4.4   CHLORIDE 94* 94* 97*   CO2 38.0* 35.0* 35.0*   ANION GAP 3.0* 5.0 6.0   BUN 16 19 11   CREATININE 0.47* 0.70 0.37*   EGFR 107.1 97.3 113.5   GLUCOSE 101* 290* 170*   CALCIUM 8.7 8.8 8.8         Lab 10/04/24  0553   TOTAL PROTEIN 5.1*   ALBUMIN 2.8*   GLOBULIN 2.3   ALT (SGPT) 7   AST (SGOT) 8   BILIRUBIN <0.2   ALK PHOS 95                           Brief Urine Lab Results       None            Microbiology Results Abnormal       Procedure Component Value - Date/Time    Blood Culture - Blood, Arm, Right [[id number]]  (Normal) Collected: 24 0601    Lab Status: Final result Specimen: Blood from Arm, Right Updated: 10/02/24 06     Blood Culture No growth at 5 days    Narrative:      Less than seven (7) mL's of blood was collected.  Insufficient quantity may yield false negative results.    Blood Culture - Blood, Arm, Left [[id number]]  (Normal) Collected: 24 0459    Lab Status: Final result  Specimen: Blood from Arm, Left Updated: 10/02/24 0645     Blood Culture No growth at 5 days    Narrative:      Less than seven (7) mL's of blood was collected.  Insufficient quantity may yield false negative results.    MRSA Screen, PCR (Inpatient) - Swab, Nares [[id number]]  (Normal) Collected: 09/28/24 1157    Lab Status: Final result Specimen: Swab from Nares Updated: 09/28/24 1413     MRSA PCR Negative    Narrative:      The negative predictive value of this diagnostic test is high and should only be used to consider de-escalating anti-MRSA therapy. A positive result may indicate colonization with MRSA and must be correlated clinically.  MRSA Negative    S. Pneumo Ag Urine or CSF - Urine, Urine, Clean Catch [[id number]]  (Normal) Collected: 09/27/24 1258    Lab Status: Final result Specimen: Urine, Clean Catch Updated: 09/27/24 1909     Strep Pneumo Ag Negative    Legionella Antigen, Urine - Urine, Urine, Clean Catch [[id number]]  (Normal) Collected: 09/27/24 1258    Lab Status: Final result Specimen: Urine, Clean Catch Updated: 09/27/24 1908     LEGIONELLA ANTIGEN, URINE Negative    Narrative:      2025-09-26    Respiratory Panel PCR w/COVID-19(SARS-CoV-2) [facility] In-House, NP Swab in UTM/VTM, 2 HR TAT - Swab, Nasopharynx [[id number]]  (Normal) Collected: 09/27/24 0524    Lab Status: Final result Specimen: Swab from Nasopharynx Updated: 09/27/24 0621     ADENOVIRUS, PCR Not Detected     Coronavirus 229E Not Detected     Coronavirus HKU1 Not Detected     Coronavirus NL63 Not Detected     Coronavirus OC43 Not Detected     COVID19 Not Detected     Human Metapneumovirus Not Detected     Human Rhinovirus/Enterovirus Not Detected     Influenza A PCR Not Detected     Influenza B PCR Not Detected     Parainfluenza Virus 1 Not Detected     Parainfluenza Virus 2 Not Detected     Parainfluenza Virus 3 Not Detected     Parainfluenza Virus 4 Not Detected     RSV, PCR Not Detected     Bordetella pertussis pcr Not  Detected     Bordetella parapertussis PCR Not Detected     Chlamydophila pneumoniae PCR Not Detected     Mycoplasma pneumo by PCR Not Detected    Narrative:      In the setting of a positive respiratory panel with a viral infection PLUS a negative procalcitonin without other underlying concern for bacterial infection, consider observing off antibiotics or discontinuation of antibiotics and continue supportive care. If the respiratory panel is positive for atypical bacterial infection (Bordetella pertussis, Chlamydophila pneumoniae, or Mycoplasma pneumoniae), consider antibiotic de-escalation to target atypical bacterial infection.            No radiology results from the last 24 hrs    Results for orders placed during the hospital encounter of 09/27/24    Adult Transthoracic Echo Complete W/ Cont if Necessary Per Protocol    Interpretation Summary    Left ventricular ejection fraction appears to be less than 20%.    The left ventricular cavity is mild to moderately dilated.    The left atrial cavity is mildly dilated.    Estimated right ventricular systolic pressure from tricuspid regurgitation is mildly elevated (38 mmHg).    There is a left pleural effusion. There is a right pleural effusion.      Current medications:  Scheduled Meds:acetaZOLAMIDE 500 mg injection, 500 mg, Intravenous, Once  aspirin, 81 mg, Oral, Daily  carvedilol, 3.125 mg, Oral, BID With Meals  empagliflozin, 10 mg, Oral, Daily  enoxaparin, 40 mg, Subcutaneous, Daily  First Mouthwash (Magic Mouthwash), 5 mL, Swish & Spit, Q6H  [Held by provider] furosemide, 20 mg, Oral, Daily  [Held by provider] glipizide, 5 mg, Oral, BID AC  guaiFENesin, 1,200 mg, Oral, Q12H  insulin glargine, 10 Units, Subcutaneous, Q12H  insulin lispro, 2-9 Units, Subcutaneous, 4x Daily AC & at Bedtime  ipratropium-albuterol, 3 mL, Nebulization, BID - RT  losartan, 25 mg, Oral, Daily  [Held by provider] metFORMIN, 1,000 mg, Oral, BID With Meals  midodrine, 5 mg, Oral, TID  "AC  pantoprazole, 40 mg, Oral, Q AM  sodium chloride, 10 mL, Intravenous, Q12H      Continuous Infusions:       PRN Meds:.  acetaminophen **OR** acetaminophen **OR** acetaminophen    senna-docusate sodium **AND** polyethylene glycol **AND** bisacodyl **AND** bisacodyl    Calcium Replacement - Follow Nurse / BPA Driven Protocol    cyclobenzaprine    dextrose    dextrose    glucagon (human recombinant)    Magnesium Standard Dose Replacement - Follow Nurse / BPA Driven Protocol    ondansetron ODT **OR** ondansetron    Phosphorus Replacement - Follow Nurse / BPA Driven Protocol    Potassium Replacement - Follow Nurse / BPA Driven Protocol    sodium chloride    sodium chloride    sodium chloride    Assessment & Plan   Assessment & Plan     Active Hospital Problems    Diagnosis  POA    **Mass of upper lobe of left lung [R91.8]  Yes    Acute HFrEF (heart failure with reduced ejection fraction) [I50.21]  Yes    NICM (nonischemic cardiomyopathy) [I42.8]  Yes    Coronary artery disease involving native coronary artery of native heart without angina pectoris [I25.10]  Yes    HFrEF (heart failure with reduced ejection fraction) [I50.20]  Yes    Type 2 diabetes mellitus with hyperglycemia, without long-term current use of insulin [E11.65]  Yes    Hyperlipidemia LDL goal <70 [E78.5]  Yes      Resolved Hospital Problems    Diagnosis Date Resolved POA    Pneumonia [J18.9] 09/28/2024 Yes    Acute respiratory failure with hypoxia [J96.01] 09/29/2024 Yes    Sepsis [A41.9] 09/28/2024 Yes    Demand ischemia [I24.89] 10/02/2024 Yes    Lactic acid increased [E87.20] 10/02/2024 Yes    Primary hypertension [I10] 10/01/2024 Yes        Brief Hospital Course to date:  Pati SIMON is a 64 y.o. female presenting to ED with SOA. Says she started to feel ill about 1 week ago at which time \"felt like she had a cold going on.\" Over the week continued to feel weaker. Developed SOA which hit suddenly late last night prompting her to come to ED. " Feels better now after receiving tx in ED. Denies cough, high fevers.    Acute decompensated systolic heart failure, improved  Nonischemic cardiomyopathy   Elevated troponin, likely demand ischemia  -ECHO with 20% EF  -s/p diuresis  -CAD, per cardiology, GDMT    Hypotension  -midodrine, and will limited GDMT    Newly diagnosed left lung neoplasm  SCCa, lung  -MRI with possible mets  -Dr. Simpson to follow up today  -XRT completes 10/10, then hopefully home with oncology follow up    Possible community-acquired/obstructive pneumonia   Leukocytosis   -completed course of antibiotics      Uncontrolled DM w/ hyperglycemia  -SSI/stable trend     Essential hypertension  Dyslipidemia    Expected Discharge Location and Transportation: Rehab, pending pathology/treatment  Expected Discharge   Expected Discharge Date: 10/10/2024; Expected Discharge Time:      VTE Prophylaxis:  Pharmacologic VTE prophylaxis orders are present.         AM-PAC 6 Clicks Score (PT): 24 (10/08/24 2040)    CODE STATUS:   Code Status and Medical Interventions: CPR (Attempt to Resuscitate); Full Support   Ordered at: 09/27/24 0713     Code Status (Patient has no pulse and is not breathing):    CPR (Attempt to Resuscitate)     Medical Interventions (Patient has pulse or is breathing):    Full Support     Copied text in this note has been reviewed and is accurate as of 10/09/24.     Aaron Krueger MD  10/09/24

## 2024-10-09 NOTE — CASE MANAGEMENT/SOCIAL WORK
Continued Stay Note  [facility]     Patient Name: Pati SIMON  MRN: [id number]  Today's Date: 10/9/2024    Admit Date: 9/27/2024    Plan: discharge plan   Discharge Plan       Row Name 10/09/24 1633       Plan    Plan discharge plan    Plan Comments Per discussion in MDR, pt will complete radiation treatment tomorrow and will get a port. Plan is home and denies discharge needs at this time. CM will cont to follow    Final Discharge Disposition Code 01 - home or self-care                   Discharge Codes    No documentation.                 Expected Discharge Date and Time       Expected Discharge Date Expected Discharge Time    Oct 10, 2024               Shivani Michelle RN

## 2024-10-09 NOTE — PROGRESS NOTES
Patient will complete her last radiation therapy treatment tomorrow.  Oncology plans to place a port and initiate chemotherapy.  She needs follow-up with  in 3 months with full PFTs.  My office is aware and should make the appointment.  Will sign off

## 2024-10-10 ENCOUNTER — APPOINTMENT (OUTPATIENT)
Dept: GENERAL RADIOLOGY | Facility: HOSPITAL | Age: 64
DRG: 871 | End: 2024-10-10
Payer: MEDICAID

## 2024-10-10 ENCOUNTER — READMISSION MANAGEMENT (OUTPATIENT)
Dept: CALL CENTER | Facility: HOSPITAL | Age: 64
End: 2024-10-10
Payer: MEDICAID

## 2024-10-10 ENCOUNTER — ANESTHESIA (OUTPATIENT)
Dept: PERIOP | Facility: HOSPITAL | Age: 64
End: 2024-10-10
Payer: MEDICAID

## 2024-10-10 VITALS
HEART RATE: 89 BPM | RESPIRATION RATE: 21 BRPM | WEIGHT: 132.5 LBS | DIASTOLIC BLOOD PRESSURE: 71 MMHG | BODY MASS INDEX: 20.8 KG/M2 | SYSTOLIC BLOOD PRESSURE: 100 MMHG | HEIGHT: 67 IN | OXYGEN SATURATION: 93 % | TEMPERATURE: 97.8 F

## 2024-10-10 LAB
ANION GAP SERPL CALCULATED.3IONS-SCNC: 12 MMOL/L (ref 5–15)
BUN SERPL-MCNC: 22 MG/DL (ref 8–23)
BUN/CREAT SERPL: 42.3 (ref 7–25)
CALCIUM SPEC-SCNC: 8.9 MG/DL (ref 8.6–10.5)
CHLORIDE SERPL-SCNC: 102 MMOL/L (ref 98–107)
CO2 SERPL-SCNC: 29 MMOL/L (ref 22–29)
CREAT SERPL-MCNC: 0.52 MG/DL (ref 0.57–1)
EGFRCR SERPLBLD CKD-EPI 2021: 104.5 ML/MIN/1.73
GLUCOSE BLDC GLUCOMTR-MCNC: 150 MG/DL (ref 70–130)
GLUCOSE BLDC GLUCOMTR-MCNC: 152 MG/DL (ref 70–130)
GLUCOSE SERPL-MCNC: 167 MG/DL (ref 65–99)
POTASSIUM SERPL-SCNC: 5 MMOL/L (ref 3.5–5.2)
SODIUM SERPL-SCNC: 143 MMOL/L (ref 136–145)

## 2024-10-10 PROCEDURE — 25010000002 BUPIVACAINE (PF) 0.5 % SOLUTION 10 ML VIAL: Performed by: SURGERY

## 2024-10-10 PROCEDURE — 77336 RADIATION PHYSICS CONSULT: CPT | Performed by: RADIOLOGY

## 2024-10-10 PROCEDURE — 80048 BASIC METABOLIC PNL TOTAL CA: CPT | Performed by: HOSPITALIST

## 2024-10-10 PROCEDURE — 25010000002 CEFAZOLIN PER 500 MG: Performed by: SURGERY

## 2024-10-10 PROCEDURE — 25810000003 LACTATED RINGERS PER 1000 ML: Performed by: ANESTHESIOLOGY

## 2024-10-10 PROCEDURE — 25010000002 LIDOCAINE 1 % SOLUTION 20 ML VIAL: Performed by: SURGERY

## 2024-10-10 PROCEDURE — 25010000002 HEPARIN (PORCINE) PER 1000 UNITS: Performed by: SURGERY

## 2024-10-10 PROCEDURE — 99239 HOSP IP/OBS DSCHRG MGMT >30: CPT | Performed by: HOSPITALIST

## 2024-10-10 PROCEDURE — 25010000002 ONDANSETRON PER 1 MG: Performed by: INTERNAL MEDICINE

## 2024-10-10 PROCEDURE — 77387 GUIDANCE FOR RADJ TX DLVR: CPT | Performed by: RADIOLOGY

## 2024-10-10 PROCEDURE — 77412 RADIATION TX DELIVERY LVL 3: CPT | Performed by: RADIOLOGY

## 2024-10-10 PROCEDURE — 25010000002 ENOXAPARIN PER 10 MG: Performed by: SURGERY

## 2024-10-10 PROCEDURE — 25010000002 LIDOCAINE PF 1% 1 % SOLUTION: Performed by: ANESTHESIOLOGY

## 2024-10-10 PROCEDURE — 76000 FLUOROSCOPY <1 HR PHYS/QHP: CPT

## 2024-10-10 PROCEDURE — 25010000002 PROPOFOL 10 MG/ML EMULSION: Performed by: ANESTHESIOLOGY

## 2024-10-10 PROCEDURE — 71045 X-RAY EXAM CHEST 1 VIEW: CPT

## 2024-10-10 PROCEDURE — 82948 REAGENT STRIP/BLOOD GLUCOSE: CPT

## 2024-10-10 PROCEDURE — C1788 PORT, INDWELLING, IMP: HCPCS | Performed by: SURGERY

## 2024-10-10 DEVICE — POWERPORT CLEARVUE ISP IMPLANTABLE PORT WITH ATTACHABLE 8F POLYURETHANE OPEN-ENDED SINGLE-LUMEN VENOUS CATHETER PROCEDURAL KIT
Type: IMPLANTABLE DEVICE | Site: CHEST | Status: FUNCTIONAL
Brand: POWERPORT CLEARVUE

## 2024-10-10 RX ORDER — ONDANSETRON 2 MG/ML
4 INJECTION INTRAMUSCULAR; INTRAVENOUS ONCE AS NEEDED
Status: DISCONTINUED | OUTPATIENT
Start: 2024-10-10 | End: 2024-10-10

## 2024-10-10 RX ORDER — MIDODRINE HYDROCHLORIDE 5 MG/1
5 TABLET ORAL
Qty: 90 TABLET | Refills: 0 | Status: SHIPPED | OUTPATIENT
Start: 2024-10-10 | End: 2024-11-09

## 2024-10-10 RX ORDER — LIDOCAINE HYDROCHLORIDE 10 MG/ML
0.5 INJECTION, SOLUTION EPIDURAL; INFILTRATION; INTRACAUDAL; PERINEURAL ONCE AS NEEDED
Status: DISCONTINUED | OUTPATIENT
Start: 2024-10-10 | End: 2024-10-10 | Stop reason: HOSPADM

## 2024-10-10 RX ORDER — PROPOFOL 10 MG/ML
VIAL (ML) INTRAVENOUS AS NEEDED
Status: DISCONTINUED | OUTPATIENT
Start: 2024-10-10 | End: 2024-10-10 | Stop reason: SURG

## 2024-10-10 RX ORDER — DEXMEDETOMIDINE HYDROCHLORIDE 100 UG/ML
INJECTION, SOLUTION INTRAVENOUS AS NEEDED
Status: DISCONTINUED | OUTPATIENT
Start: 2024-10-10 | End: 2024-10-10 | Stop reason: SURG

## 2024-10-10 RX ORDER — FUROSEMIDE 20 MG
20 TABLET ORAL DAILY
Qty: 30 TABLET | Refills: 2 | Status: SHIPPED | OUTPATIENT
Start: 2024-10-10

## 2024-10-10 RX ORDER — SODIUM CHLORIDE 0.9 % (FLUSH) 0.9 %
10 SYRINGE (ML) INJECTION AS NEEDED
Status: DISCONTINUED | OUTPATIENT
Start: 2024-10-10 | End: 2024-10-10 | Stop reason: HOSPADM

## 2024-10-10 RX ORDER — SODIUM CHLORIDE, SODIUM LACTATE, POTASSIUM CHLORIDE, CALCIUM CHLORIDE 600; 310; 30; 20 MG/100ML; MG/100ML; MG/100ML; MG/100ML
9 INJECTION, SOLUTION INTRAVENOUS CONTINUOUS
Status: DISCONTINUED | OUTPATIENT
Start: 2024-10-11 | End: 2024-10-10

## 2024-10-10 RX ORDER — CARVEDILOL 3.12 MG/1
3.12 TABLET ORAL 2 TIMES DAILY WITH MEALS
Qty: 120 TABLET | Refills: 1 | Status: SHIPPED | OUTPATIENT
Start: 2024-10-10

## 2024-10-10 RX ORDER — EPHEDRINE SULFATE 50 MG/ML
INJECTION, SOLUTION INTRAVENOUS AS NEEDED
Status: DISCONTINUED | OUTPATIENT
Start: 2024-10-10 | End: 2024-10-10 | Stop reason: SURG

## 2024-10-10 RX ORDER — NALOXONE HCL 0.4 MG/ML
0.4 VIAL (ML) INJECTION AS NEEDED
Status: DISCONTINUED | OUTPATIENT
Start: 2024-10-10 | End: 2024-10-10

## 2024-10-10 RX ORDER — SODIUM CHLORIDE, SODIUM LACTATE, POTASSIUM CHLORIDE, CALCIUM CHLORIDE 600; 310; 30; 20 MG/100ML; MG/100ML; MG/100ML; MG/100ML
9 INJECTION, SOLUTION INTRAVENOUS CONTINUOUS
Status: DISCONTINUED | OUTPATIENT
Start: 2024-10-10 | End: 2024-10-10

## 2024-10-10 RX ORDER — EPHEDRINE SULFATE 50 MG/ML
5 INJECTION, SOLUTION INTRAVENOUS ONCE AS NEEDED
Status: DISCONTINUED | OUTPATIENT
Start: 2024-10-10 | End: 2024-10-10

## 2024-10-10 RX ORDER — LOSARTAN POTASSIUM 25 MG/1
25 TABLET ORAL DAILY
Qty: 90 TABLET | Refills: 1 | Status: SHIPPED | OUTPATIENT
Start: 2024-10-10

## 2024-10-10 RX ORDER — MUPIROCIN 20 MG/G
OINTMENT TOPICAL AS NEEDED
Status: DISCONTINUED | OUTPATIENT
Start: 2024-10-10 | End: 2024-10-10 | Stop reason: HOSPADM

## 2024-10-10 RX ORDER — BUPIVACAINE HCL/0.9 % NACL/PF 0.125 %
PLASTIC BAG, INJECTION (ML) EPIDURAL AS NEEDED
Status: DISCONTINUED | OUTPATIENT
Start: 2024-10-10 | End: 2024-10-10 | Stop reason: SURG

## 2024-10-10 RX ORDER — FAMOTIDINE 10 MG/ML
20 INJECTION, SOLUTION INTRAVENOUS ONCE
Status: COMPLETED | OUTPATIENT
Start: 2024-10-10 | End: 2024-10-10

## 2024-10-10 RX ORDER — FENTANYL CITRATE 50 UG/ML
50 INJECTION, SOLUTION INTRAMUSCULAR; INTRAVENOUS
Status: DISCONTINUED | OUTPATIENT
Start: 2024-10-10 | End: 2024-10-10

## 2024-10-10 RX ORDER — LIDOCAINE HYDROCHLORIDE 10 MG/ML
INJECTION, SOLUTION EPIDURAL; INFILTRATION; INTRACAUDAL; PERINEURAL AS NEEDED
Status: DISCONTINUED | OUTPATIENT
Start: 2024-10-10 | End: 2024-10-10 | Stop reason: SURG

## 2024-10-10 RX ADMIN — DEXMEDETOMIDINE HYDROCHLORIDE 4 MCG: 100 INJECTION, SOLUTION INTRAVENOUS at 09:10

## 2024-10-10 RX ADMIN — SODIUM CHLORIDE, POTASSIUM CHLORIDE, SODIUM LACTATE AND CALCIUM CHLORIDE 9 ML/HR: 600; 310; 30; 20 INJECTION, SOLUTION INTRAVENOUS at 08:29

## 2024-10-10 RX ADMIN — DEXMEDETOMIDINE HYDROCHLORIDE 4 MCG: 100 INJECTION, SOLUTION INTRAVENOUS at 09:16

## 2024-10-10 RX ADMIN — MIDODRINE HYDROCHLORIDE 5 MG: 5 TABLET ORAL at 11:34

## 2024-10-10 RX ADMIN — Medication 10 ML: at 13:37

## 2024-10-10 RX ADMIN — DEXMEDETOMIDINE HYDROCHLORIDE 4 MCG: 100 INJECTION, SOLUTION INTRAVENOUS at 09:29

## 2024-10-10 RX ADMIN — LIDOCAINE HYDROCHLORIDE 10 MG: 10 INJECTION, SOLUTION EPIDURAL; INFILTRATION; INTRACAUDAL; PERINEURAL at 09:16

## 2024-10-10 RX ADMIN — EPHEDRINE SULFATE 2.5 MG: 50 INJECTION INTRAVENOUS at 09:30

## 2024-10-10 RX ADMIN — SODIUM CHLORIDE 2000 MG: 900 INJECTION INTRAVENOUS at 09:17

## 2024-10-10 RX ADMIN — Medication 100 MCG: at 09:16

## 2024-10-10 RX ADMIN — DEXMEDETOMIDINE HYDROCHLORIDE 4 MCG: 100 INJECTION, SOLUTION INTRAVENOUS at 09:20

## 2024-10-10 RX ADMIN — ONDANSETRON 4 MG: 2 INJECTION INTRAMUSCULAR; INTRAVENOUS at 09:53

## 2024-10-10 RX ADMIN — ASPIRIN 81 MG: 81 TABLET, COATED ORAL at 11:34

## 2024-10-10 RX ADMIN — DEXMEDETOMIDINE HYDROCHLORIDE 4 MCG: 100 INJECTION, SOLUTION INTRAVENOUS at 09:13

## 2024-10-10 RX ADMIN — DEXMEDETOMIDINE HYDROCHLORIDE 6 MCG: 100 INJECTION, SOLUTION INTRAVENOUS at 09:34

## 2024-10-10 RX ADMIN — DEXMEDETOMIDINE HYDROCHLORIDE 4 MCG: 100 INJECTION, SOLUTION INTRAVENOUS at 09:24

## 2024-10-10 RX ADMIN — DEXMEDETOMIDINE HYDROCHLORIDE 5 MCG: 100 INJECTION, SOLUTION INTRAVENOUS at 09:44

## 2024-10-10 RX ADMIN — PROPOFOL 10 MG: 10 INJECTION, EMULSION INTRAVENOUS at 09:16

## 2024-10-10 RX ADMIN — ENOXAPARIN SODIUM 40 MG: 100 INJECTION SUBCUTANEOUS at 11:34

## 2024-10-10 RX ADMIN — FAMOTIDINE 20 MG: 10 INJECTION, SOLUTION INTRAVENOUS at 08:30

## 2024-10-10 NOTE — ANESTHESIA PREPROCEDURE EVALUATION
Anesthesia Evaluation     Patient summary reviewed and Nursing notes reviewed   no history of anesthetic complications:   NPO Solid Status: > 8 hours  NPO Liquid Status: > 8 hours           Airway   Mallampati: II  TM distance: >3 FB  Neck ROM: full  No difficulty expected  Comment: Previous grade 1 view with Pollack  Dental    (+) poor dentition        Pulmonary    (+) pneumonia , pleural effusion (bilateral), a smoker Current, lung cancer (90% obstructing mass in the left mainstem bronchus.),shortness of breath, decreased breath sounds  Cardiovascular     ECG reviewed  Rhythm: regular  Rate: normal    (+) hypertension, CAD, CHF Systolic <55%, hyperlipidemia    ROS comment: 9/28/24 echo  Interpretation Summary       ·  Left ventricular ejection fraction appears to be less than 20%.  ·  The left ventricular cavity is mild to moderately dilated.  ·  The left atrial cavity is mildly dilated.  ·  Estimated right ventricular systolic pressure from tricuspid regurgitation is mildly elevated (38 mmHg).  ·  There is a left pleural effusion. There is a right pleural effusion.  Moderate MR    Neuro/Psych- negative ROS  GI/Hepatic/Renal/Endo    (+) GERD well controlled, liver disease history of elevated LFT, diabetes mellitus type 2  (-) no thyroid disorder    Musculoskeletal     Abdominal    Substance History      OB/GYN          Other      history of cancer (lung cancer)    ROS/Med Hx Other: Recent sepsis secondary to obstructing pneumonia                Anesthesia Plan    ASA 4     MAC     (Local MAC)  intravenous induction     Anesthetic plan, risks, benefits, and alternatives have been provided, discussed and informed consent has been obtained with: patient.    Plan discussed with CRNA.      CODE STATUS:    Code Status (Patient has no pulse and is not breathing): CPR (Attempt to Resuscitate)  Medical Interventions (Patient has pulse or is breathing): Full Support

## 2024-10-10 NOTE — PROGRESS NOTES
"General Surgery    I have reviewed the chart, my prior note(s), appropriate imaging, and labs.  I have again discussed the risks and benefits of port placement with ultrasound and fluoroscopic guidance with the patient.  All of their questions have been answered.  They understand and wish to proceed with surgical intervention.    CBC  Results from last 7 days   Lab Units 10/04/24  0553   WBC 10*3/mm3 10.67   HEMOGLOBIN g/dL 10.5*   HEMATOCRIT % 33.8*   PLATELETS 10*3/mm3 422       CMP  Results from last 7 days   Lab Units 10/10/24  0421 10/06/24  0519 10/04/24  0553   SODIUM mmol/L 143   < > 134*   POTASSIUM mmol/L 5.0   < > 5.6*   CHLORIDE mmol/L 102   < > 94*   CO2 mmol/L 29.0   < > 35.0*   BUN mg/dL 22   < > 19   CREATININE mg/dL 0.52*   < > 0.70   CALCIUM mg/dL 8.9   < > 8.8   BILIRUBIN mg/dL  --   --  <0.2   ALK PHOS U/L  --   --  95   ALT (SGPT) U/L  --   --  7   AST (SGOT) U/L  --   --  8   GLUCOSE mg/dL 167*   < > 290*    < > = values in this interval not displayed.       Coagulation Cascade  No results found for: \"PTT\", \"INR\", \"PROTIME\"    No components found for: \"ECHO\"     Results for orders placed during the hospital encounter of 09/27/24    Adult Transthoracic Echo Complete W/ Cont if Necessary Per Protocol    Interpretation Summary    Left ventricular ejection fraction appears to be less than 20%.    The left ventricular cavity is mild to moderately dilated.    The left atrial cavity is mildly dilated.    Estimated right ventricular systolic pressure from tricuspid regurgitation is mildly elevated (38 mmHg).    There is a left pleural effusion. There is a right pleural effusion.         "

## 2024-10-10 NOTE — PROGRESS NOTES
[facility] Medicine Services  PROGRESS NOTE    Patient Name: Pati SIMON  : 1960  MRN: [id number]    Date of Admission: 2024  Primary Care Physician: Anuradha Toledo APRN    Subjective   Subjective     CC: Dyspnea    HPI: In bed. Sitting up in PACU. No pain. No f/c.    Objective   Objective     Vital Signs:   Temp:  [97 °F (36.1 °C)-98.3 °F (36.8 °C)] 98.1 °F (36.7 °C)  Heart Rate:  [] 78  Resp:  [10-22] 17  BP: ()/(52-73) 79/59  Flow (L/min):  [2-3] 2     Physical Exam:  NAD, alert  OP clear, dry MM  Neck supple  RRR  Diminished on L  R chest port in place  +BS, soft  TRUJILLO  Normal affect  No change in examination from 10/9    Results Reviewed:  LAB RESULTS:      Lab 10/04/24  0553   WBC 10.67   HEMOGLOBIN 10.5*   HEMATOCRIT 33.8*   PLATELETS 422   NEUTROS ABS 8.83*   IMMATURE GRANS (ABS) 0.03   LYMPHS ABS 0.65*   MONOS ABS 1.15*   EOS ABS 0.00   MCV 79.7         Lab 10/10/24  0421 10/06/24  0519 10/04/24  0553   SODIUM 143 135* 134*   POTASSIUM 5.0 5.0 5.6*   CHLORIDE 102 94* 94*   CO2 29.0 38.0* 35.0*   ANION GAP 12.0 3.0* 5.0   BUN 22 16 19   CREATININE 0.52* 0.47* 0.70   EGFR 104.5 107.1 97.3   GLUCOSE 167* 101* 290*   CALCIUM 8.9 8.7 8.8         Lab 10/04/24  0553   TOTAL PROTEIN 5.1*   ALBUMIN 2.8*   GLOBULIN 2.3   ALT (SGPT) 7   AST (SGOT) 8   BILIRUBIN <0.2   ALK PHOS 95                           Brief Urine Lab Results       None            Microbiology Results Abnormal       Procedure Component Value - Date/Time    Blood Culture - Blood, Arm, Right [[id number]]  (Normal) Collected: 24 0601    Lab Status: Final result Specimen: Blood from Arm, Right Updated: 10/02/24 06     Blood Culture No growth at 5 days    Narrative:      Less than seven (7) mL's of blood was collected.  Insufficient quantity may yield false negative results.    Blood Culture - Blood, Arm, Left [[id number]]  (Normal) Collected: 24 0453    Lab Status: Final result  Specimen: Blood from Arm, Left Updated: 10/02/24 0645     Blood Culture No growth at 5 days    Narrative:      Less than seven (7) mL's of blood was collected.  Insufficient quantity may yield false negative results.    MRSA Screen, PCR (Inpatient) - Swab, Nares [[id number]]  (Normal) Collected: 09/28/24 1157    Lab Status: Final result Specimen: Swab from Nares Updated: 09/28/24 1413     MRSA PCR Negative    Narrative:      The negative predictive value of this diagnostic test is high and should only be used to consider de-escalating anti-MRSA therapy. A positive result may indicate colonization with MRSA and must be correlated clinically.  MRSA Negative    S. Pneumo Ag Urine or CSF - Urine, Urine, Clean Catch [[id number]]  (Normal) Collected: 09/27/24 1258    Lab Status: Final result Specimen: Urine, Clean Catch Updated: 09/27/24 1909     Strep Pneumo Ag Negative    Legionella Antigen, Urine - Urine, Urine, Clean Catch [[id number]]  (Normal) Collected: 09/27/24 1258    Lab Status: Final result Specimen: Urine, Clean Catch Updated: 09/27/24 1908     LEGIONELLA ANTIGEN, URINE Negative    Narrative:      2025-09-26    Respiratory Panel PCR w/COVID-19(SARS-CoV-2) [facility]/JE In-House, NP Swab in UTM/VTM, 2 HR TAT - Swab, Nasopharynx [[id number]]  (Normal) Collected: 09/27/24 0524    Lab Status: Final result Specimen: Swab from Nasopharynx Updated: 09/27/24 0621     ADENOVIRUS, PCR Not Detected     Coronavirus 229E Not Detected     Coronavirus HKU1 Not Detected     Coronavirus NL63 Not Detected     Coronavirus OC43 Not Detected     COVID19 Not Detected     Human Metapneumovirus Not Detected     Human Rhinovirus/Enterovirus Not Detected     Influenza A PCR Not Detected     Influenza B PCR Not Detected     Parainfluenza Virus 1 Not Detected     Parainfluenza Virus 2 Not Detected     Parainfluenza Virus 3 Not Detected     Parainfluenza Virus 4 Not Detected     RSV, PCR Not Detected     Bordetella pertussis pcr Not  Detected     Bordetella parapertussis PCR Not Detected     Chlamydophila pneumoniae PCR Not Detected     Mycoplasma pneumo by PCR Not Detected    Narrative:      In the setting of a positive respiratory panel with a viral infection PLUS a negative procalcitonin without other underlying concern for bacterial infection, consider observing off antibiotics or discontinuation of antibiotics and continue supportive care. If the respiratory panel is positive for atypical bacterial infection (Bordetella pertussis, Chlamydophila pneumoniae, or Mycoplasma pneumoniae), consider antibiotic de-escalation to target atypical bacterial infection.            FL C Arm During Surgery    Result Date: 10/10/2024  This procedure was auto-finalized with no dictation required.     Results for orders placed during the hospital encounter of 09/27/24    Adult Transthoracic Echo Complete W/ Cont if Necessary Per Protocol    Interpretation Summary    Left ventricular ejection fraction appears to be less than 20%.    The left ventricular cavity is mild to moderately dilated.    The left atrial cavity is mildly dilated.    Estimated right ventricular systolic pressure from tricuspid regurgitation is mildly elevated (38 mmHg).    There is a left pleural effusion. There is a right pleural effusion.      Current medications:  Scheduled Meds:[Transfer Hold] acetaZOLAMIDE 500 mg injection, 500 mg, Intravenous, Once  aspirin, 81 mg, Oral, Daily  carvedilol, 3.125 mg, Oral, BID With Meals  [Transfer Hold] empagliflozin, 10 mg, Oral, Daily  enoxaparin, 40 mg, Subcutaneous, Daily  [Transfer Hold] First Mouthwash (Magic Mouthwash), 5 mL, Swish & Spit, Q6H  [Held by provider] furosemide, 20 mg, Oral, Daily  [Held by provider] glipizide, 5 mg, Oral, BID AC  [Transfer Hold] guaiFENesin, 1,200 mg, Oral, Q12H  [Transfer Hold] insulin glargine, 10 Units, Subcutaneous, Q12H  [Transfer Hold] insulin lispro, 2-9 Units, Subcutaneous, 4x Daily AC & at  Bedtime  [Transfer Hold] ipratropium-albuterol, 3 mL, Nebulization, BID - RT  losartan, 25 mg, Oral, Daily  [Held by provider] metFORMIN, 1,000 mg, Oral, BID With Meals  [Transfer Hold] midodrine, 5 mg, Oral, TID AC  pantoprazole, 40 mg, Oral, Q AM  [Transfer Hold] pharmacy consult - MTM, , Does not apply, Daily  sodium chloride, 10 mL, Intravenous, Q12H      Continuous Infusions:[START ON 10/11/2024] lactated ringers, 9 mL/hr, Last Rate: 9 mL/hr (10/10/24 0907)  lactated ringers, 9 mL/hr          PRN Meds:.  acetaminophen **OR** acetaminophen **OR** acetaminophen    senna-docusate sodium **AND** polyethylene glycol **AND** bisacodyl **AND** bisacodyl    Calcium Replacement - Follow Nurse / BPA Driven Protocol    cyclobenzaprine    [Transfer Hold] dextrose    [Transfer Hold] dextrose    ePHEDrine    fentanyl    [Transfer Hold] glucagon (human recombinant)    lactated ringers    lidocaine PF 1%    Magnesium Standard Dose Replacement - Follow Nurse / BPA Driven Protocol    naloxone    ondansetron ODT **OR** ondansetron    ondansetron    Phosphorus Replacement - Follow Nurse / BPA Driven Protocol    Potassium Replacement - Follow Nurse / BPA Driven Protocol    [Transfer Hold] sodium chloride    sodium chloride    sodium chloride    sodium chloride    Assessment & Plan   Assessment & Plan     Active Hospital Problems    Diagnosis  POA    **Mass of upper lobe of left lung [R91.8]  Yes    Cancer of bronchus of left upper lobe [C34.12]  Unknown    Acute HFrEF (heart failure with reduced ejection fraction) [I50.21]  Yes    NICM (nonischemic cardiomyopathy) [I42.8]  Yes    Coronary artery disease involving native coronary artery of native heart without angina pectoris [I25.10]  Yes    HFrEF (heart failure with reduced ejection fraction) [I50.20]  Yes    Type 2 diabetes mellitus with hyperglycemia, without long-term current use of insulin [E11.65]  Yes    Hyperlipidemia LDL goal <70 [E78.5]  Yes      Resolved Hospital  "Problems    Diagnosis Date Resolved POA    Pneumonia [J18.9] 09/28/2024 Yes    Acute respiratory failure with hypoxia [J96.01] 09/29/2024 Yes    Sepsis [A41.9] 09/28/2024 Yes    Demand ischemia [I24.89] 10/02/2024 Yes    Lactic acid increased [E87.20] 10/02/2024 Yes    Primary hypertension [I10] 10/01/2024 Yes        Brief Hospital Course to date:  Pati SIMON is a 64 y.o. female presenting to ED with SOA. Says she started to feel ill about 1 week ago at which time \"felt like she had a cold going on.\" Over the week continued to feel weaker. Developed SOA which hit suddenly late last night prompting her to come to ED. Feels better now after receiving tx in ED. Denies cough, high fevers.    Acute decompensated systolic heart failure, improved  Nonischemic cardiomyopathy   Elevated troponin, likely demand ischemia  -ECHO with 20% EF  -s/p diuresis  -CAD, per cardiology, GDMT    Hypotension  -midodrine, and will limited GDMT  -stable    Newly diagnosed left lung neoplasm  SCCa, lung  -MRI with possible mets  -Dr. Simpson to follow up today  -XRT completes 10/10, then hopefully home with oncology follow up  -s/p port placement by Dr. Dickens for chemotherapy    Possible community-acquired/obstructive pneumonia   Leukocytosis   -completed course of antibiotics      Uncontrolled DM w/ hyperglycemia  -SSI/stable trend     Essential hypertension  Dyslipidemia    Expected Discharge Location and Transportation: Rehab, pending pathology/treatment  Expected Discharge   Expected Discharge Date: 10/10/2024; Expected Discharge Time:      VTE Prophylaxis:  Pharmacologic VTE prophylaxis orders are present.         AM-PAC 6 Clicks Score (PT): 24 (10/09/24 2040)    CODE STATUS:   Code Status and Medical Interventions: CPR (Attempt to Resuscitate); Full Support   Ordered at: 09/27/24 0713     Code Status (Patient has no pulse and is not breathing):    CPR (Attempt to Resuscitate)     Medical Interventions (Patient has pulse or is " breathing):    Full Support     Copied text in this note has been reviewed and is accurate as of 10/10/24.     Aaron Krueger MD  10/10/24

## 2024-10-10 NOTE — OP NOTE
General Surgery Operative Note    Pati SIMON  [id number]  1960    Date of Surgery:  10/10/2024 10:01 EDT    Pre-op Diagnosis: Lung cancer    Post-op Diagnosis: Lung cancer    Procedure: Right IJ port placement, Bard PowerPort LUKE                      Guidance with fluoroscopy and ultrasound    Surgeon: Claude Dickens MD    Anesthesia: Monitored Anesthesia Care    Fluids: 400 mL    Estimated Blood Loss: Less than 25 mL    Findings and interpretation ultrasound:   The right internal jugular vein was easily compressible without intraluminal  defect.  The direction of venous flow was normal.  There is no photodocumentation in the chart, printer problem.      Findings and interpretation fluoroscopy:   #1  The guidewire is in the right internal jugular vein   #2  The guidewire is in the superior vena cava   #3  Dilation over the wire   #4  The catheter tip is at the level of the cavoatrial junction    Complications: None apparent    History:   64-year-old lady with lung cancer requires IV access for future chemotherapeutic needs.       I had a discussion regarding the risks and benefits of port placement with the use of fluoroscopy and ultrasound, including but not limited to: bleeding, infection, injury to adjacent viscera (the carotid artery, lung, myocardium), nonfunction, and medical issues from a cardiopulmonary standpoint.  After informed consent the patient was taken to the operating room.    Procedure:     The patient was placed in the supine position on the operating table and conscious sedation was administered by anesthesia.  A timeout was observed.      The patient's neck was interrogated with ultrasound and the right internal jugular vein was easily compressible without intraluminal defect.  The patient's neck was then prepped and draped in the standard sterile fashion.      The skin was anesthetized.  I accessed the internal jugular vein under direct ultrasound guidance after anesthetizing  the skin.  I placed my 0.035 guidewire through the introducer needle into the internal jugular vein and under fluoroscopic guidance maneuvered this into the superior vena cava.  I then dilated over the wire leaving a peel-away sheath in the superior vena cava.  The port pocket was created in the standard fashion on the right chest with the electrocautery Bovie.  The catheter was then tunneled appropriately.  The tip of the catheter was positioned at the level of the cavoatrial junction.  The catheter was attached to the port reservoir, placed in the port pocket.  The port was accessed with a Doherty needle and flushed well and was instilled with heparinized saline.  The port pocket was closed with interrupted 3-0 Vicryl followed by a running 4-0 subcuticular Monocryl. The nick in the neck was closed with 4-0 Monocryl.  The neck incision was dressed with Mastisol, Steri-Strips, Telfa, and a Tegaderm.  The port chest incision was dressed with Mastisol, Steri-Strips, and a chlorhexidine impregnated Tegaderm.     The patient tolerated the procedure well and was transferred back to the PACU in stable condition.  A stat portable chest x-ray will be obtained.      Claude Dickens MD     Date: 10/10/2024  Time: 10:01 EDT    Statement Selected

## 2024-10-10 NOTE — ANESTHESIA POSTPROCEDURE EVALUATION
Patient: Pati SIMON    Procedure Summary       Date: 10/10/24 Room / Location:  [facility] 03 / [facility]    Anesthesia Start: 0907 Anesthesia Stop: 1010    Procedure: INSERTION VENOUS ACCESS DEVICE Diagnosis:     Surgeons: Claude Dickens MD Provider: Corrie Winslow MD    Anesthesia Type: MAC ASA Status: 4            Anesthesia Type: MAC    Vitals  Vitals Value Taken Time   /69 10/10/24 1010   Temp 98.2 °F (36.8 °C) 10/10/24 1010   Pulse 87 10/10/24 1010   Resp 10 10/10/24 1010   SpO2 99 % 10/10/24 1010           Post Anesthesia Care and Evaluation    Patient location during evaluation: PACU  Patient participation: complete - patient participated  Level of consciousness: awake and alert  Pain score: 0  Pain management: adequate    Airway patency: patent  Anesthetic complications: No anesthetic complications  PONV Status: none  Cardiovascular status: hemodynamically stable and acceptable  Respiratory status: nonlabored ventilation, acceptable and nasal cannula  Hydration status: acceptable

## 2024-10-11 ENCOUNTER — DOCUMENTATION (OUTPATIENT)
Dept: CARDIOLOGY | Facility: CLINIC | Age: 64
End: 2024-10-11
Payer: MEDICAID

## 2024-10-11 ENCOUNTER — TRANSITIONAL CARE MANAGEMENT TELEPHONE ENCOUNTER (OUTPATIENT)
Dept: CALL CENTER | Facility: HOSPITAL | Age: 64
End: 2024-10-11
Payer: MEDICAID

## 2024-10-11 DIAGNOSIS — C34.12 CANCER OF BRONCHUS OF LEFT UPPER LOBE: Primary | ICD-10-CM

## 2024-10-11 NOTE — OUTREACH NOTE
Prep Survey      Flowsheet Row Responses   [facility] patient discharged from? [place]   Is LACE score < 7 ? No   Eligibility [facility]   Date of Admission 09/27/24   Date of Discharge 10/10/24   Discharge diagnosis Mass of upper lobe of left lung-Acute HFrEF -Pneumonia   Does the patient have one of the following disease processes/diagnoses(primary or secondary)? CHF   Does the patient have Home health ordered? No   Is there a DME ordered? No   Prep survey completed? Yes            CHRISTO TSAI - Registered Nurse

## 2024-10-11 NOTE — CASE MANAGEMENT/SOCIAL WORK
Case Management Discharge Note      Final Note: Per medical team, pt is medically ready for discharge today and plan is home. Pt denies discharge needs. Follow up appts are in AVS.         Selected Continued Care - Discharged on 10/10/2024 Admission date: 9/27/2024 - Discharge disposition: Home or Self Care      Destination    No services have been selected for the patient.                Durable Medical Equipment    No services have been selected for the patient.                Dialysis/Infusion    No services have been selected for the patient.                Home Medical Care    No services have been selected for the patient.                Therapy    No services have been selected for the patient.                Community Resources    No services have been selected for the patient.                Community & DME    No services have been selected for the patient.                         Final Discharge Disposition Code: 01 - home or self-care

## 2024-10-11 NOTE — OUTREACH NOTE
Call Center TCM Note      Flowsheet Row Responses   [facility] patient discharged from? [place] [facility]   Does the patient have one of the following disease processes/diagnoses(primary or secondary)? CHF   TCM attempt successful? Yes   Call start time 1301   Call end time 1304   Discharge diagnosis Mass of upper lobe of left lung-Acute HFrEF -Pneumonia   Person spoke with today (if not patient) and relationship patient   Meds reviewed with patient/caregiver? Yes   Does the patient have all medications ordered at discharge? Yes   Prescription comments No concerns or questions noted.   Is the patient taking all medications as directed (includes completed medication regime)? Yes   Comments 10/14/2024  2:00 PM  HOSPITAL FOLLOW UP 30 min [facility] Rebecca Dao PA-C   Does the patient have an appointment with their PCP within 7-14 days of discharge? Yes   Has home health visited the patient within 72 hours of discharge? N/A   Psychosocial issues? No   Did the patient receive a copy of their discharge instructions? Yes   Nursing interventions Reviewed instructions with patient   What is the patient's perception of their health status since discharge? Improving   Nursing interventions Nurse provided patient education   Is the patient/caregiver able to teach back the hierarchy of who to call/visit for symptoms/problems? PCP, Specialist, Home health nurse, Urgent Care, ED, 911 Yes   Is the patient able to teach back Heart Failure Zones? Yes   CHF Zone this Call Green Zone   Green Zone Patient reports doing well, No new or worsening shortness of breath, Physical activity level is normal for you, No chest pain, Weight check stable, No new swelling -  feet, ankles and legs look normal for you   Green Zone Interventions Daily weight check, Low sodium diet, Follow up visits planned, Meds as directed   TCM call completed? Yes   Wrap up additional comments Educated the importance of daily wt.  Needs refill on maintence medications.   Call end time 1304   Would this patient benefit from a Referral to [facility] Social Work? No   Is the patient interested in additional calls from an ambulatory ? No             Nimisha Bennett RN    10/11/2024, 13:06 EDT

## 2024-10-13 NOTE — DISCHARGE SUMMARY
"    [facility] Medicine Services  DISCHARGE SUMMARY    Patient Name: Pati SIMON  : 1960  MRN: [id number]    Date of Admission: 2024  4:40 AM  Date of Discharge:  10/10/2024  Primary Care Physician: No primary care provider on file.    Consults       Date and Time Order Name Status Description    10/9/2024  8:24 AM Inpatient General Surgery Consult Completed     10/3/2024  3:28 PM Inpatient Radiation Oncology Consult Completed     10/2/2024  8:41 AM Inpatient Hematology & Oncology Consult Completed     2024 12:44 PM Inpatient Pulmonology Consult Completed     2024  4:19 PM Inpatient Cardiology Consult Completed             Hospital Course     Presenting Problem: Lung mass    Active Hospital Problems    Diagnosis  POA    **Mass of upper lobe of left lung [R91.8]  Yes    Cancer of bronchus of left upper lobe [C34.12]  Unknown    Acute HFrEF (heart failure with reduced ejection fraction) [I50.21]  Yes    NICM (nonischemic cardiomyopathy) [I42.8]  Yes    Coronary artery disease involving native coronary artery of native heart without angina pectoris [I25.10]  Yes    HFrEF (heart failure with reduced ejection fraction) [I50.20]  Yes    Type 2 diabetes mellitus with hyperglycemia, without long-term current use of insulin [E11.65]  Yes    Hyperlipidemia LDL goal <70 [E78.5]  Yes      Resolved Hospital Problems    Diagnosis Date Resolved POA    Pneumonia [J18.9] 2024 Yes    Acute respiratory failure with hypoxia [J96.01] 2024 Yes    Sepsis [A41.9] 2024 Yes    Demand ischemia [I24.89] 10/02/2024 Yes    Lactic acid increased [E87.20] 10/02/2024 Yes    Primary hypertension [I10] 10/01/2024 Yes          Hospital Course:  Pati SIMON is a 64 y.o. female presenting to ED with SOA. Says she started to feel ill about 1 week ago at which time \"felt like she had a cold going on.\" Over the week continued to feel weaker. Developed SOA which hit suddenly late last " night prompting her to come to ED. Feels better now after receiving tx in ED. Denies cough, high fevers.     Acute decompensated systolic heart failure, improved  Nonischemic cardiomyopathy   Elevated troponin, likely demand ischemia  -ECHO with 20% EF  -s/p diuresis  -CAD, per cardiology, GDMT, cardiology follow up     Hypotension  -midodrine, and will limited GDMT  -stable     Newly diagnosed left lung neoplasm  SCCa, lung  -MRI with possible mets  -Dr. Simpson to follow up  -XRT completed 10/10  -s/p port placement by Dr. Dickens for chemotherapy     Possible community-acquired/obstructive pneumonia   Leukocytosis   -completed course of antibiotics      Uncontrolled DM w/ hyperglycemia  -SSI/stable trend     Essential hypertension  Dyslipidemia    Discharge Follow Up Recommendations for outpatient labs/diagnostics:  Dr. Simpson 1 week    Day of Discharge     HPI: In bed. Sitting up in PACU. No pain. No f/c.        Objective  Objective      Vital Signs:   Temp:  [97 °F (36.1 °C)-98.3 °F (36.8 °C)] 98.1 °F (36.7 °C)  Heart Rate:  [] 78  Resp:  [10-22] 17  BP: ()/(52-73) 79/59  Flow (L/min):  [2-3] 2     Physical Exam:  NAD, alert  OP clear, dry MM  Neck supple  RRR  Diminished on L  R chest port in place  +BS, soft  TRUJILLO  Normal affect  No change in examination from 10/9    Pertinent  and/or Most Recent Results     LAB RESULTS:          Lab 10/10/24  0421   SODIUM 143   POTASSIUM 5.0   CHLORIDE 102   CO2 29.0   ANION GAP 12.0   BUN 22   CREATININE 0.52*   EGFR 104.5   GLUCOSE 167*   CALCIUM 8.9                         Brief Urine Lab Results       None          Microbiology Results (last 10 days)       ** No results found for the last 240 hours. **            XR Chest 1 View    Result Date: 10/10/2024  XR CHEST 1 VW Date of Exam: 10/10/2024 10:05 AM EDT Indication: Right IJ port Comparison: CT chest 9/28/2024 Findings: Placement of a right-sided port catheter. The tip is at the cavoatrial  junction. The right lung is clear. Large mass involving the left upper lobe extending into the lingula again noted. No significant effusion on the right. Stable small left pleural effusion.     Impression: 1. Placement of a right-sided port catheter with the tip at the cavoatrial junction. 2. No pneumothorax. 3. Large mass involving the left upper lobe extending into the lingula again noted. Electronically Signed: Fredi Sy MD  10/10/2024 11:04 AM EDT  Workstation ID: [id number]    FL C Arm During Surgery    Result Date: 10/10/2024  This procedure was auto-finalized with no dictation required.    MRI Brain With & Without Contrast    Result Date: 10/3/2024  MRI BRAIN W WO CONTRAST Date of Exam: 10/3/2024 6:03 AM EDT Indication: lung cancer staging.  Comparison: None available. Technique:  Routine multiplanar/multisequence sequence images of the brain were obtained before and after the uneventful administration of 12 mL Multihance. Findings: No diffusion restriction. Major arterial flow voids appear intact. No mass effect, midline shift or abnormal extra-axial collection. There is mild bilateral maxillary sinus mucosal thickening and mild atelectasis of the right maxillary sinus. Trace left mastoid effusion. Midline structures appear intact. Temporomandibular joints and parotid glands appear normal. Superficial soft tissues appear unremarkable. There are 2 small round areas of enhancement within the left frontal calvarium on thick slice postcontrast axial T1 sequence images 25-22 each measuring 9 mm diameter. These appear hyperintense on T2 imaging and isointense on T1 imaging. These may represent hemangiomas, but metastasis are possible. There are mild scattered patchy and small rounded areas of FLAIR hyperintense signal within the cerebral white matter. Ventricular size and configuration is normal for age. No acute or chronic intracranial hemorrhage. No enhancing lesions in the brain or cerebellum.     Impression:  1.No acute intracranial abnormality. No evidence of intracranial metastatic disease. 2.There are 2 small round areas of enhancement within the left frontal calvarium. These could represent hemangiomas, but metastasis are possible. Consider evaluation with bone scan. CT may provide more information regarding bony anatomy. If a PET/CT is planned for this patient's work-up then inclusion of this portion of the head should be requested. 3.Mild chronic small vessel ischemic change. 4.Mild paranasal sinus mucosal disease. Electronically Signed: Saeed Singh MD  10/3/2024 6:24 AM EDT  Workstation ID: [id number]    CT Abdomen Pelvis With Contrast    Result Date: 10/2/2024  CT ABDOMEN PELVIS W CONTRAST Date of Exam: 10/2/2024 1:38 PM EDT Indication: Lung mass concerning for malignancy.  Staging/looking for amenable biopsy if found. Comparison: None available. Technique: Axial CT images were obtained of the abdomen and pelvis following the uneventful intravenous administration of 85 mL Isovue-300. Reconstructed coronal and sagittal images were also obtained. Automated exposure control and iterative construction methods were used. Findings: Liver: The liver is unremarkable in morphology. No focal liver lesion is seen. No biliary dilation is seen. Gallbladder: Tiny calcified gallstone. Gallbladder is otherwise unremarkable Pancreas: Unremarkable. Spleen: Unremarkable. Adrenal glands: Mildly prominent/nodular left adrenal gland versus adjacent prominent celiac ganglion. Genitourinary tract: Multifocal cortical scarring within the right kidney. Kidneys are otherwise unremarkable. No hydronephrosis is seen. Visualized portions of the ureters and urinary bladder appear unremarkable. 4.2 cm complex lesion within the right adnexa containing macroscopic fat, likely an ovarian dermoid. Gastrointestinal tract: Infraumbilical hernia defect contains a loop of nonobstructed transverse colon. Large amount of stool throughout the colon no  findings to suggest bowel obstruction. Appendix: The appendix is not identified Other findings: No free air or significant free fluid. No pathologically enlarged lymph nodes are seen. Vascular calcifications are present. The IVC is unremarkable. Bones and soft tissues: No acute osseous lesion is identified. Superficial soft tissue anasarca is present. Just below the umbilicus, there is a ventral hernia defect containing a nonobstructing loop of colon. Slightly inferior to this is an additional ventral hernia defect containing only fat. Small fat-containing umbilical hernia also noted. Superficial soft tissue anasarca is seen. Lung bases: Partially visualized left pleural effusion with left basilar atelectasis. Please refer to chest CTA from 9/28/2024.     Impression: 1.No acute abnormality identified within the abdomen or pelvis. 2.Mildly prominent/nodular left adrenal gland versus adjacent prominent celiac ganglion. 3.Several ventral abdominal wall hernia defects, 1 of which contains nonobstructed transverse colon. 4.Large amount of stool throughout the colon. 5.4.2 cm complex lesion within the right adnexa containing macroscopic fat, likely an ovarian dermoid. 6.Cholelithiasis. 7.Partially visualized left pleural effusion with left basilar atelectasis. Please refer to chest CTA from 9/28/2024. Electronically Signed: Davide Craig MD  10/2/2024 2:09 PM EDT  Workstation ID: [id number]             Results for orders placed during the hospital encounter of 09/27/24    Adult Transthoracic Echo Complete W/ Cont if Necessary Per Protocol    Interpretation Summary    Left ventricular ejection fraction appears to be less than 20%.    The left ventricular cavity is mild to moderately dilated.    The left atrial cavity is mildly dilated.    Estimated right ventricular systolic pressure from tricuspid regurgitation is mildly elevated (38 mmHg).    There is a left pleural effusion. There is a right pleural effusion.      Plan  for Follow-up of Pending Labs/Results: Reviewed    Discharge Details        Discharge Medications        New Medications        Instructions Start Date   carvedilol 3.125 MG tablet  Commonly known as: COREG   3.125 mg, Oral, 2 Times Daily With Meals      furosemide 20 MG tablet  Commonly known as: LASIX   20 mg, Oral, Daily      Jardiance 10 MG tablet tablet  Generic drug: empagliflozin   10 mg, Oral, Daily      midodrine 5 MG tablet  Commonly known as: PROAMATINE   5 mg, Oral, 3 Times Daily Before Meals      pharmacy consult - MTM   Does not apply, Daily             Continue These Medications        Instructions Start Date   albuterol sulfate  (90 Base) MCG/ACT inhaler  Commonly known as: PROVENTIL HFA;VENTOLIN HFA;PROAIR HFA   2 puffs, Inhalation, Every 4 Hours PRN      aspirin 81 MG EC tablet   81 mg, Oral, Daily      Calcium Citrate-Vitamin D3 315-6.25 MG-MCG tablet tablet  Commonly known as: CITRACAL   Oral, Daily      cyclobenzaprine 5 MG tablet  Commonly known as: FLEXERIL   5 mg, Oral, 3 Times Daily PRN      ezetimibe 10 MG tablet  Commonly known as: Zetia   10 mg, Oral, Daily      glipizide 10 MG 24 hr tablet  Commonly known as: GLUCOTROL XL   10 mg, Oral, Daily      losartan 25 MG tablet  Commonly known as: COZAAR   25 mg, Oral, Daily      metFORMIN 1000 MG tablet  Commonly known as: GLUCOPHAGE   1,000 mg, Oral, 2 Times Daily With Meals      nicotine polacrilex 4 MG gum  Commonly known as: Nicorette   4 mg, Mouth/Throat, Every 2 Hours PRN      omeprazole 20 MG capsule  Commonly known as: priLOSEC   20 mg, Oral, Daily      SITagliptin 50 MG tablet  Commonly known as: Januvia   50 mg, Oral, Daily             Stop These Medications      amLODIPine 10 MG tablet  Commonly known as: NORVASC     hydroCHLOROthiazide 25 MG tablet     metoprolol tartrate 50 MG tablet  Commonly known as: LOPRESSOR              Allergies   Allergen Reactions    Codeine Irritability     jittering    Lipitor [Atorvastatin]  Myalgia    Rosuvastatin Nausea Only         Discharge Disposition:  Home or Self Care    Diet:  Hospital:No active diet order           Activity:      Restrictions or Other Recommendations:         CODE STATUS:    Code Status and Medical Interventions: CPR (Attempt to Resuscitate); Full Support   Ordered at: 09/27/24 0713     Code Status (Patient has no pulse and is not breathing):    CPR (Attempt to Resuscitate)     Medical Interventions (Patient has pulse or is breathing):    Full Support       Future Appointments   Date Time Provider Department Center   10/14/2024  2:00 PM Yasmeen Polk APRN [facility]   10/14/2024  2:00 PM Rebecca Dao PA-C [facility]   10/16/2024 11:00 AM PHARMACY CHEMO EDUCATION [facility]   10/16/2024 12:00 PM ACCESS AND LAB DRAW CHAIR 1 [facility]   10/17/2024  8:00 AM CHAIR 6 [facility]   11/5/2024  2:00 PM Amelie Chang APRN [facility]   11/5/2024  2:45 PM ACCESS AND LAB DRAW CHAIR 1 [facility]   11/7/2024  8:45 AM Zarina Simpson MD [facility]   11/7/2024  9:30 AM CHAIR 19 [facility]   11/8/2024  9:30 AM Nya Enriquez APRN [facility] None   11/12/2024  1:20 PM Cony Gomez APRN [facility]   1/20/2025  2:00 PM [facility], PFT LAB 3 [facility]   1/20/2025  2:30 PM Alirio De La Cruz DO [facility]       Additional Instructions for the Follow-ups that You Need to Schedule       Discharge Follow-up with PCP   As directed       Currently Documented PCP:    Anuradha Toledo APRN    PCP Phone Number:    [phone]     Follow Up Details: 1 week        Discharge Follow-up with Specified Provider: [facility] 4 weeks   As directed      To: [facility] 4 weeks        Discharge Follow-up with Specified Provider: CHF clinic 3-5 days   As directed      To: CHF clinic 3-5 days        Discharge Follow-up with Specified Provider: DR. Prosper Simpson oncology next week   As  directed      To: DR. Prosper Simpson oncology next week        CBC and Differential   Oct 16, 2024      Manual Differential: No   Release to patient: Routine Release        Comprehensive metabolic panel   Oct 16, 2024      Release to patient: Routine Release        T4, free   Oct 16, 2024      Release to patient: Routine Release        TSH   Oct 16, 2024      Release to patient: Routine Release                      Aaron Krueger MD  10/13/24      Time Spent on Discharge:  I spent  40  minutes on this discharge activity which included: face-to-face encounter with the patient, reviewing the data in the system, coordination of the care with the nursing staff as well as consultants, documentation, and entering orders.

## 2024-11-14 LAB
CYTO UR: NORMAL
LAB AP CASE REPORT: NORMAL
LAB AP CLINICAL INFORMATION: NORMAL
LAB AP DIAGNOSIS COMMENT: NORMAL
PATH REPORT.ADDENDUM SPEC: NORMAL
PATH REPORT.FINAL DX SPEC: NORMAL
PATH REPORT.GROSS SPEC: NORMAL

## (undated) DEVICE — LUBE GEL ENDOGLIDE 1.1OZ

## (undated) DEVICE — APPL CHLORAPREP TINTED 26ML TEAL

## (undated) DEVICE — Device: Brand: SINGLE USE ASPIRATION NEEDLE NA-U401SX

## (undated) DEVICE — CVR PROB ULTRASND/TRANSD W/GEL LNG 18X250CM STRL

## (undated) DEVICE — ST EXT MICROBORE FIX M LL 38IN

## (undated) DEVICE — GLV SURG SIGNATURE TOUCH PF LTX 7.5 STRL BX/50

## (undated) DEVICE — FIRST STEP BEDSIDE ADD WATER KIT - RESEALABLE STAND-UP POUCH, ENDOSCOPIC CLEANING PAD - 1 POUCH: Brand: FIRST STEP BEDSIDE ADD WATER KIT - RESEALABLE STAND-UP POUCH, ENDOSCOPIC CLEANIN

## (undated) DEVICE — ULTRACLEAN ACCESSORY ELECTRODE 1" (2.54 CM) COATED BLADE: Brand: ULTRACLEAN

## (undated) DEVICE — 3M™ TEGADERM™ CHG DRESSING 25/CARTON 4 CARTONS/CASE 1658: Brand: TEGADERM™

## (undated) DEVICE — BANDAGE,GAUZE,BULKEE II,4.5"X4.1YD,STRL: Brand: MEDLINE

## (undated) DEVICE — SYR LL TP 10ML STRL

## (undated) DEVICE — SYR LUER SLPTP 50ML

## (undated) DEVICE — TUBING, SUCTION, 1/4" X 10', STRAIGHT: Brand: MEDLINE

## (undated) DEVICE — NEPTUNE 2X2 HEMOSTATIC PAD: Brand: NEPTUNE

## (undated) DEVICE — SNAP KOVER: Brand: UNBRANDED

## (undated) DEVICE — SAFELINER SUCTION CANISTER 1000CC: Brand: DEROYAL

## (undated) DEVICE — ADAPT SWVL FIBROPTIC BRONCH

## (undated) DEVICE — TRAP,MUCUS SPECIMEN,40CC: Brand: MEDLINE

## (undated) DEVICE — FRCP BX RADJAW4 PULM WO NDL STD1.8X2 100

## (undated) DEVICE — PENCL SMOKE/EVAC MEGADYNE TELESCP 10FT

## (undated) DEVICE — SYR SLP TP 10ML DISP

## (undated) DEVICE — SUT MNCRYL PLS ANTIB UD 4/0 PS2 18IN

## (undated) DEVICE — PK MINOR SPLT 10

## (undated) DEVICE — SOLIDIFIER LIQ PREMISORB 1500CC

## (undated) DEVICE — LUER-LOK 360°: Brand: CONNECTA, LUER-LOK

## (undated) DEVICE — CONTN GRAD MEAS TRIANG 32OZ BLK

## (undated) DEVICE — SYRINGE, LUER LOCK, 5ML: Brand: MEDLINE

## (undated) DEVICE — DECANTER BAG 9": Brand: MEDLINE INDUSTRIES, INC.

## (undated) DEVICE — GLV SURG PREMIERPRO MIC LTX PF SZ7.5 BRN

## (undated) DEVICE — ST LINER SAFECAP GRN RED CP STRL

## (undated) DEVICE — INTENDED FOR TISSUE SEPARATION, AND OTHER PROCEDURES THAT REQUIRE A SHARP SURGICAL BLADE TO PUNCTURE OR CUT.: Brand: BARD-PARKER ® STAINLESS STEEL BLADES

## (undated) DEVICE — SINGLE USE BIOPSY VALVE MAJ-210: Brand: SINGLE USE BIOPSY VALVE (STERILE)

## (undated) DEVICE — ANTIBACTERIAL UNDYED BRAIDED (POLYGLACTIN 910), SYNTHETIC ABSORBABLE SUTURE: Brand: COATED VICRYL

## (undated) DEVICE — INTRAOPERATIVE COVER KIT, 10 PACK: Brand: SITE-RITE

## (undated) DEVICE — BOWL UTIL STRL 32OZ

## (undated) DEVICE — SYR SLPTP 20CC

## (undated) DEVICE — DRSNG SURESITE WNDW 4X4.5

## (undated) DEVICE — Device: Brand: BALLOON

## (undated) DEVICE — SOL IRR NACL 0.9PCT BT 1000ML

## (undated) DEVICE — ADHS LIQ MASTISOL 2/3ML

## (undated) DEVICE — GLV SURG PREMIERPRO MIC LTX PF SZ8 BRN

## (undated) DEVICE — SINGLE USE SUCTION VALVE MAJ-209: Brand: SINGLE USE SUCTION VALVE (STERILE)